# Patient Record
Sex: FEMALE | Race: WHITE
[De-identification: names, ages, dates, MRNs, and addresses within clinical notes are randomized per-mention and may not be internally consistent; named-entity substitution may affect disease eponyms.]

---

## 2017-01-11 ENCOUNTER — HOSPITAL ENCOUNTER (OUTPATIENT)
Dept: HOSPITAL 75 - ER | Age: 62
Setting detail: OBSERVATION
LOS: 1 days | Discharge: HOME | End: 2017-01-12
Attending: FAMILY MEDICINE | Admitting: FAMILY MEDICINE
Payer: COMMERCIAL

## 2017-01-11 VITALS — BODY MASS INDEX: 15.73 KG/M2 | HEIGHT: 64 IN | WEIGHT: 92.12 LBS

## 2017-01-11 VITALS — DIASTOLIC BLOOD PRESSURE: 67 MMHG | SYSTOLIC BLOOD PRESSURE: 108 MMHG

## 2017-01-11 VITALS — DIASTOLIC BLOOD PRESSURE: 68 MMHG | SYSTOLIC BLOOD PRESSURE: 109 MMHG

## 2017-01-11 DIAGNOSIS — Z99.81: ICD-10-CM

## 2017-01-11 DIAGNOSIS — J44.1: Primary | ICD-10-CM

## 2017-01-11 DIAGNOSIS — R09.02: ICD-10-CM

## 2017-01-11 LAB
ALBUMIN SERPL-MCNC: 4 G/DL (ref 3.2–4.5)
ALT SERPL-CCNC: 16 U/L (ref 0–55)
ANION GAP SERPL CALC-SCNC: 8 MMOL/L (ref 5–14)
ARTERIAL PATENCY WRIST A: POSITIVE
AST SERPL-CCNC: 17 U/L (ref 5–34)
BASE EXCESS STD BLDA CALC-SCNC: 2.5 MMOL/L (ref -2.5–2.5)
BASOPHILS # BLD AUTO: 0.1 10^3/UL (ref 0–0.1)
BASOPHILS NFR BLD AUTO: 1 % (ref 0–10)
BILIRUB SERPL-MCNC: 0.4 MG/DL (ref 0.1–1)
BODY TEMPERATURE: 96
BUN SERPL-MCNC: 22 MG/DL (ref 7–18)
BUN/CREAT SERPL: 26
CALCIUM SERPL-MCNC: 10 MG/DL (ref 8.5–10.1)
CHLORIDE SERPL-SCNC: 100 MMOL/L (ref 98–107)
CO2 BLDA CALC-SCNC: 27.8 MMOL/L (ref 21–31)
CO2 SERPL-SCNC: 30 MMOL/L (ref 21–32)
CREAT SERPL-MCNC: 0.84 MG/DL (ref 0.6–1.3)
EOSINOPHIL # BLD AUTO: 1.1 10^3/UL (ref 0–0.3)
EOSINOPHIL NFR BLD AUTO: 10 % (ref 0–10)
ERYTHROCYTE [DISTWIDTH] IN BLOOD BY AUTOMATED COUNT: 13.1 % (ref 10–14.5)
GFR SERPLBLD BASED ON 1.73 SQ M-ARVRAT: > 60 ML/MIN
GLUCOSE SERPL-MCNC: 103 MG/DL (ref 70–105)
HCO3 BLDA-SCNC: 27 MMOL/L (ref 23–27)
LYMPHOCYTES # BLD AUTO: 2.3 X 10^3 (ref 1–4)
LYMPHOCYTES NFR BLD AUTO: 21 % (ref 12–44)
MCH RBC QN AUTO: 30 PG (ref 25–34)
MCHC RBC AUTO-ENTMCNC: 33 G/DL (ref 32–36)
MCV RBC AUTO: 91 FL (ref 80–99)
MONOCYTES # BLD AUTO: 1.3 X 10^3 (ref 0–1)
MONOCYTES NFR BLD AUTO: 12 % (ref 0–12)
NEUTROPHILS # BLD AUTO: 6.3 X 10^3 (ref 1.8–7.8)
NEUTROPHILS NFR BLD AUTO: 57 % (ref 42–75)
PCO2 BLDA: 37 MMHG (ref 35–45)
PH BLDA: 7.47 [PH] (ref 7.37–7.43)
PLATELET # BLD: 397 10^3/UL (ref 130–400)
PMV BLD AUTO: 8.9 FL (ref 7.4–10.4)
PO2 BLDA: 92 MMHG (ref 79–93)
POTASSIUM SERPL-SCNC: 4.4 MMOL/L (ref 3.6–5)
PROT SERPL-MCNC: 7 G/DL (ref 6.4–8.2)
RBC # BLD AUTO: 4.59 10^6/UL (ref 4.35–5.85)
SAO2 % BLDA FROM PO2: 98 % (ref 94–100)
SODIUM SERPL-SCNC: 138 MMOL/L (ref 135–145)
WBC # BLD AUTO: 11 10^3/UL (ref 4.3–11)

## 2017-01-11 PROCEDURE — 94640 AIRWAY INHALATION TREATMENT: CPT

## 2017-01-11 PROCEDURE — 71010: CPT

## 2017-01-11 PROCEDURE — 85025 COMPLETE CBC W/AUTO DIFF WBC: CPT

## 2017-01-11 PROCEDURE — 80053 COMPREHEN METABOLIC PANEL: CPT

## 2017-01-11 PROCEDURE — 96374 THER/PROPH/DIAG INJ IV PUSH: CPT

## 2017-01-11 PROCEDURE — 82805 BLOOD GASES W/O2 SATURATION: CPT

## 2017-01-11 PROCEDURE — 36415 COLL VENOUS BLD VENIPUNCTURE: CPT

## 2017-01-11 PROCEDURE — 94760 N-INVAS EAR/PLS OXIMETRY 1: CPT

## 2017-01-11 NOTE — XMS REPORT
Continuity of Care Document

 Created on: 2016



ESTEFANÍA AMANDA

External Reference #: [id number]

: 1955

Sex: Female



Demographics







 Address  [address] [place]  [address]

 

 Home Phone  [phone]

 

 Preferred Language  English

 

 Marital Status  Unknown

 

 Congregational Affiliation  Unknown

 

 Race  Unknown

 

 Ethnic Group  Unknown





Author







 Author  [facility]

 

 Organization  [facility]

 

 Address  Unknown

 

 Phone  Unavailable







Support







 Name  Relationship  Address  Phone

 

 HERNANDEZ HERNANDEZ  Caregiver  [facility]

 [address] [place], [place]  [id number] [phone]

 

 NINA HENLEY MD  Caregiver  [address], [address]

[place]  [address]  [phone]

 

 SONIA ROLLE MD  Caregiver  [address], [address]

[place], [place]  [address] [phone]

 

 PHILLIP STAUFFER  Next Of Kin  UN[place]

[place]  [address]  [phone]







Care Team Providers







 Care Team Member Name  Role  Phone

 

 NINA HENLEY MD  PCP  [phone]







Insurance Providers







 Payer Name  Policy Number  Subscriber Name  Relationship

 

 [id number]  Estefanía Amanda Self / Same As Patient

 

 s Medicare  [id number]  Estefanía Amanda Self / Same As Patient







Advance Directives







 Directive  Response  Recorded Date/Time

 

 Advance Directives  No  16 12:24pm

 

 Health Care Power of   No  16 9:47am

 

 Organ Donor  No  16 9:47am

 

 Resuscitation Status  Full Code  16 12:24pm







Chief Complaint and Reason for Visit







 Chief Complaint  General Problems/Pain

 

 Reason for Visit  [id number]







Problems

Active Problems







 Medical Problem  Onset Date  Status

 

 COPD with acute exacerbation  Unknown  Acute

 

 Dog bite  Unknown  Acute

 

 Laceration  Unknown  Acute

 

 Pneumonia  Unknown  Acute

 

 Shingles outbreak  Unknown  Acute







Medications

Current Home Medications







 Medication  Dose  Units  Route  Directions  Days/Qty  Instructions  Start Date

 

 Trazodone Hcl 50 Mg  50  Mg  Oral  Bedtime        12/26/15

 

 Omeprazole 40 Mg  40  Mg  Oral  Daily        12/26/15

 

 Citalopram Hydrobromide 10 Mg  10  Mg  Oral  Daily        16

 

 Lorazepam 1 Mg  1  Mg  Oral  Every 6 Hours as needed for Anxiety        

 

 Albuterol Sulfate 18 Gm  2  Puff  Inhalation  Every 4-6 Hours  as needed for 
Shortness Of Breath        16

 

 Tiotropium Bromide 1 Inh  1  Puff  Inhalation  Daily        16

 

 Guaifenesin/D-Methorphan Hb/Pe 118 Ml  10  Ml  Oral  Every 4-6 Hours  as 
needed for Cough        16

 

 Benzonatate 100 Mg  100  Mg  Oral  Three Times A Day as needed for Cough  30  
   16

 

 Prednisone 10 Mg  10  Mg  Oral  Daily  42  Take 6 tabs(60mg)daily,decrease by 
1 tab(10mg)every other day.  16

 

 Valacyclovir Hcl 1,000 Mg  1,000  Mg  Oral  Three Times A Day  21     16

 

 Hydrocodone/Acetaminophen 1 Each  1  Each  Oral  Every 6 Hours as needed for 
Pain  15     16





Past Home Medications







 Medication  Directions  Ordered  Status

 

 Clarithromycin 500 Mg Tab,     09  Discontinued

 

 Albuterol 8.5 Gm Hfa.aer.ad,     09  Discontinued

 

 Salmeterol Xinafoate 50 Mcg Disk,     09  Discontinued

 

 Promethazine Hcl/Codeine 120 Ml Syrup,  As Needed  09  Discontinued

 

 Tiotropium Bromide 18 Mcg Cap.w.dev,  Daily  09  Discontinued

 

 Albuterol 17 Gm Aerosol, 17 Gm Inhalation  As Needed  11  Discontinued

 

 Clonazepam (Klonopin) 0.5 Mg Tablet, 1 Tab Oral  As Needed  11  
Discontinued

 

 Cephalexin Monohydrate (Keflex) 500 Mg Capsule, 1 Each Oral  Three Times A Day
  11  Discontinued

 

 Prednisone 20 Mg Tab, 20 Mg Oral  Daily  11  Discontinued

 

 Prednisone 20 Mg Tab, 40 Mg Oral  Daily  11  Discontinued

 

 Prednisone 20 Mg Tab, 60 Mg Oral  Daily  11  Discontinued

 

 Lorazepam (Ativan) 1 Mg Tablet, 1 Each Oral  Three Times A Day And Prn    Discontinued

 

 Prednisone 10 Mg Tab,  5 Tabs Daily  11  Discontinued

 

 Prednisone 50 Mg Tab, 50 Mg Oral  Daily  11  Discontinued

 

 Ciprofloxacin 500 Mg Tablet, 1 Tab Oral  Twice A Day  13  Discontinued

 

 Potassium Chloride 10 Meq Tablet.sa, 10 Meq Oral  Daily  13  Discontinued

 

 Hydroxyzine Hcl 25 Mg Tablet, 1 - 2 Each Oral  Qid Prn  13  Discontinued

 

 Prednisone 20 Mg Tab, 20 Mg Oral  Twice A Day  13  Discontinued

 

 Prednisone 10 Mg Tab, 10 Mg Oral  Daily  09/22/15  Discontinued

 

 Montelukast Sodium 10 Mg Tablet, 10 Mg Oral  Bedtime  09/22/15  Discontinued

 

 Doxycycline Hyclate 100 Mg Tablet, 100 Mg Oral  Twice A Day  09/22/15  
Discontinued

 

 Prednisone 20 Mg Tab, 60 Mg Oral  Daily  09/22/15  Discontinued

 

 Hydrocodone/Chlorphen P-Stirex 480 Ml Eli.er.12h, 5 Ml Oral  Every 12 Hours as 
needed for Cough  09/22/15  Discontinued

 

 Prednisone 10 Mg Tab, 5 Mg Oral  Daily  12/26/15  Discontinued

 

 Amoxicillin/Potassium Clav 1 Each Tablet, 1 Each Oral  Twice A Day  12/26/15  
Discontinued

 

 Acetaminophen With Codeine 1 Each Tablet, 1 Each Oral  Every 6 Hours as needed 
for Pain  12/26/15  Discontinued







Social History







 Social History Problem  Response  Recorded Date/Time

 

 Alcohol Use  Denies Use  2015 5:12pm

 

 Recreational Drug Use  No  2015 5:12pm

 

 Recent Foreign Travel  No  2016 12:20pm

 

 Recent Infectious Disease Exposure  No  2016 12:20pm

 

 Sexually Transmitted Disease  No  2016 12:24pm

 

 Smoking Status  Former Smoker  2016 12:24pm

 

 Type Used  Cigarettes  2016 1:47pm

 

 Recent Hopitalizations  Yes  2016 12:24pm

 

 Sexually Transmitted Disease  No  2016 12:24pm









 Query  Response  Start Date  Stop Date

 

 Smoking Status  Former Smoker      







Hospital Discharge Instructions

No hospital discharge instructions.



Plan of Care







 Discharge Date  16 2:53pm

 

 Disposition  01 HOME, SELF-CARE

 

 Condition at Discharge  Stable

 

 Instructions/Education Provided  Shingles (POLLY)

 

 Prescriptions  See Medication Section

 

 Referrals  NINA HENLEY MD - Primary Care Physician

 

 Additional Instructions/Education  1. See your doctor next week for recheck.  
Radiology also recommends a repeat CT 

scan of your chest in 3-6 months to ensure that the nodules are stable in size  

 2. Return to ER for any concerns  

 3. Medication as directed  

All discharge instructions reviewed with patient and/or family. Voiced 

understanding.  4







Functional Status

No functional status results.



Allergies, Adverse Reactions, Alerts







 Allergen  Type  Severity  Reaction  Status  Last Updated

 

 Salmeterol  Allergy  Mild     Active  09

 

 fluticasone ([id number])  Allergy  Mild     Active  09







Immunizations







 Name  Given  Type

 

 FLU TRIvalent 5 years - Adult  16  Administered







Vital Signs

Acute Vital Signs





 Vital  Response  Date/Time

 

 Temperature (Fahrenheit)  99.4 degrees F (97.6 - 99.5)  2016 12:20pm

 

 Temperature (Calculated Celsius)  37.00904 degrees C (36.4 - 37.5)  2016 
12:20pm

 

 Temperature Source  Tympanic  2016 8:00am

 

 Pulse Rate (adult)  103 bpm (60 - 90)  2016 12:20pm

 

 Respiratory Rate  20 bpm (12 - 24)  2016 12:20pm

 

 O2 Sat by Pulse Oximetry  98 % (88 - 100)  2016 12:20pm

 

 Blood Pressure  137/89 mm Hg  2016 12:20pm

 

    Blood Pressure Mean  105 mm Hg  2016 12:20pm

 

 Pain      

 

    Numeric Pain Scale  10-Worst Possible Pain  2016 12:45pm

 

 Height (Feet)  5 feet  2016 12:20pm

 

 Height (Inches)  5 inches  2016 12:20pm

 

 Height (Calculated Centimeters)  165.009246 cm  2016 12:20pm

 

 Weight (Pounds)  110 pounds  2016 12:20pm

 

 Weight (Ounces)  4.0 oz  2016 7:10am

 

 Weight (Calculated Grams)  24809.316 gm  2016 7:10am

 

 Weight (Calculated Kilograms)  49.609630 kilograms  2016 12:20pm

 

 Calculated BMI  16.9  2016 9:54am

 

 Capillary Refill      

 

    Capillary Refill  Less Than 3 Seconds  2016 12:20pm







Results

Laboratory Results







 Test Name  Result  Units  Flags  Reference  Collection Date/Time  Result Date/
Time  Comments

 

 White Blood Count  9.8  10^3/uL     4.3-11.0  2016 5:17am  2016 6:
32am   

 

 Red Blood Count  4.00  10^6/uL  L  4.35-5.85  2016 5:17am  2016 6:
32am   

 

 Hemoglobin  12.5  G/DL     11.5-16.0  2016 5:17am  2016 6:32am   

 

 Hematocrit  38  %     35-52  2016 5:  2016 6:32am   

 

 Mean Corpuscular Volume  96  FL     80-99  2016 5:  2016 6:
32am   

 

 Mean Corpuscular Hemoglobin  31  PG     25-34  2016 5:  2016 6:
32am   

 

 Mean Corpuscular Hemoglobin Concent  33  G/DL     32-36  2016 5:  2016 6:32am   

 

 Red Cell Distribution Width  13.4  %     10.0-14.5  2016 5:  2016 6:32am   

 

 Platelet Count  253  10^3/uL     130-400  2016 5:  2016 6:32am
   

 

 Mean Platelet Volume  9.7  FL     7.4-10.4  2016 5:  2016 6:
32am   

 

 Neutrophils (%) (Auto)  87  %  H  42-75  2016 5:  2016 6:32am 
  

 

 Lymphocytes (%) (Auto)  6  %  L  12-44  2016 5:  2016 6:32am   

 

 Monocytes (%) (Auto)  7  %     0-12  2016 5:  2016 6:32am   

 

 Eosinophils (%) (Auto)  0  %     0-10  2016 5:  2016 6:32am   

 

 Basophils (%) (Auto)  0  %     0-10  2016 5:  2016 6:32am   

 

 Neutrophils # (Auto)  8.5  X 10^3  H  1.8-7.8  2016 5:  2016 6:
32am   

 

 Lymphocytes # (Auto)  0.6  X 10^3  L  1.0-4.0  2016 5:  2016 6:
32am   

 

 Monocytes # (Auto)  0.7  X 10^3     0.0-1.0  2016 5:  2016 6:
32am   

 

 Eosinophils # (Auto)  0.0  10^3/uL     0.0-0.3  2016 5:17am  2016 6
:32am   

 

 Basophils # (Auto)  0.0  10^3/uL     0.0-0.1  2016 5:17am  2016 6:
32am   

 

 Neutrophils % (Manual)  53  %        2016 6:50am  2016 7:53am   

 

 Band Neutrophils  0  %        2016 6:50am  2016 7:53am   

 

 Lymphocytes % (Manual)  33  %        2016 6:50am  2016 7:53am   

 

 Monocytes % (Manual)  7  %        2016 6:50am  2016 7:53am   

 

 Eosinophils % (Manual)  7  %        2016 6:50am  2016 7:53am   

 

 Basophils % (Manual)  0  %        2016 6:50am  2016 7:53am   

 

 Blood Morphology Comment  NORMAL           2016 6:50am  2016 7:
53am   

 

 D-Dimer  < 0.27  UG/ML     0.00-0.49  2016 6:50am  2016 7:37am   

 

 Sodium Level  141  MMOL/L     135-145  2016 6:50am  2016 7:30am   

 

 Potassium Level  4.4  MMOL/L     3.6-5.0  2016 6:50am  2016 7:30am
   

 

 Chloride Level  105  MMOL/L       2016 6:50am  2016 7:30am   

 

 Carbon Dioxide Level  26  MMOL/L     21-32  2016 6:50am  2016 7:
30am   

 

 Anion Gap  10  MMOL/L     5-14  2016 6:50am  2016 7:30am   

 

 Blood Urea Nitrogen  10  MG/DL     7-18  2016 6:50am  2016 7:30am 
  

 

 Creatinine  0.95  MG/DL     0.60-1.30  2016 6:50am  2016 7:30am   

 

 BUN/Creatinine Ratio  2016 6:50am  2016 7:30am   

 

 Estimat Glomerular Filtration Rate  60           2016 6:50am  2016 
7:30am                    GFR INTERPRETIVE DATA  

  

         UNITS FOR ESTIMATED GFR (eGFR): mL/min/1.73 M2  

         REFERENCE RANGE FOR ESTIMATED GFR (eGFR)  

                                          eGFR  

         NORMAL eGFR                       >60  

         MODERATELY DECREASED eGFR          30-59  

         SEVERLY DECREASED eGFR             15-29  

         KIDNEY FAILURE                    <15 (OR DIALYSIS)  

 

 Glucose Level  106  MG/DL  H    2016 6:50am  2016 7:30am   

 

 Calcium Level  9.6  MG/DL     8.5-10.1  2016 6:50am  2016 7:30am   

 

 Total Bilirubin  0.5  MG/DL     0.1-1.0  2016 6:50am  2016 7:30am 
  

 

 Alkaline Phosphatase  81  U/L       2016 6:50am  2016 7:30am
   

 

 Aspartate Amino Transf (AST/SGOT)  17  U/L     5-34  2016 6:50am  2016 7:30am   

 

 Alanine Aminotransferase (ALT/SGPT)  20  U/L     0-55  2016 6:50am   7:30am   

 

 B-Type Natriuretic Peptide  13.6  PG/ML     <100.0  2016 6:50am  2016 7:50am   

 

 Total Protein  6.3  G/DL  L  6.4-8.2  2016 6:50am  2016 7:30am   

 

 Albumin  4.2  G/DL     3.2-4.5  2016 6:50am  2016 7:30am   

 

 Lactic Acid Level  1.4  MMOL/L     0.5-2.0  2016 7:46am  2016 8:
16am   





Pending Laboratory Results







 Test Name  Collection Date/Time

 

     

 

     

 

     

 

     

 

     

 

     

 

     

 

     

 

     

 

     

 

     

 

     

 

     

 

     

 

     

 

     

 

     

 

     

 

     

 

     

 

     

 

     

 

     

 

     

 

     

 

     

 

     

 

     

 

     

 

     

 

     

 

     

 

     

 

     

 

     

 

     

 

     





Microbiology Results







 Procedure  Source  Result  Collection Date/Time  Result Date/Time

 

 Blood Culture  Peripheral, Rt Ac  No growth  2016 9:17am  2016 4:
13pm

 

 Blood Culture  Peripheral, Rt Ac  No growth  2016 7:46am  2016 4:
13pm







Procedures







 Procedure  Status  Date  Provider(s)

 

 Tracing only of electrocardiogram  Completed  16  CARLITOS CHRISTOPHER MD







Encounters







 Encounter  Location  Arrival/Admit Date  Discharge/Depart Date  Attending 
Provider

 

 Departed Emergency Room  [facility]  16 12:12pm   2:53pm  HERNANDEZ HERNANDEZ

 

 Discharged Inpatient  [facility]  11/10/16 2:25pm   1:43pm  NINA HENLEY MD











 Recent Diagnosis

## 2017-01-11 NOTE — ED DYSPNEA
General


Stated Complaint:  SOA


Source of Information:  Patient


Exam Limitations:  No Limitations





History of Present Illness


Time Seen by Provider:  15:37


Initial Comments


History of COPD on home oxygen.  Increased dyspnea over the past several days.  

Cough productive of white sputum..  No fevers or chest pain.  Using nebulizer 

treatments without relief.  Hospitalized in November for same.





Allergies and Home Medications


Allergies


Coded Allergies:  


     fluticasone (Unverified  Allergy, Mild, 12/3/09)


     salmeterol (Unverified  Allergy, Mild, 12/3/09)





Home Medications


Albuterol Sulfate 18 Gm Hfa.aer.ad 2 PUFF IH EVERY 4-6 HOURS  PRN PRN SHORTNESS 

OF BREATH (Reported) 


Benzonatate 100 Mg Capsule #30 100 MG PO TID PRN PRN COUGH 


   Prescribed by: LEA BLOCK on 11/12/16 1216


Citalopram Hydrobromide 10 Mg Tablet 10 MG PO DAILY (Reported) 


Guaifenesin/D-Methorphan Hb/ Ml Liquid 10 ML PO EVERY 4-6 HOURS  PRN PRN 

COUGH (Reported) 


Hydrocodone/Acetaminophen 1 Each Tablet #15 1 EACH PO Q6H PRN PRN PAIN 


   Prescribed by: HERNANDEZ HERNANDEZ on 12/2/16 1301


Hydroxyzine Pamoate 50 Mg Capsule #20 50 MG PO Q6H 


   Prescribed by: DAYAN MAGAÑA on 12/16/16 1926


Lorazepam 1 Mg Tablet 1 MG PO Q6H PRN PRN ANXIETY (Reported) 


Omeprazole 40 Mg Capsule.dr 40 MG PO DAILY (Reported) 


Prednisone 10 Mg Tab.ds.pk #42 10 MG PO DAILY 


   Take 6 tabs(60mg)daily,decrease by 1 tab(10mg)every other day. 


   Prescribed by: LEA BLOCK on 11/12/16 1216


Tiotropium Bromide 1 Inh Aerp 1 PUFF IH DAILY (Reported) 


Trazodone HCl 50 Mg Tablet 50 MG PO HS (Reported) 


Valacyclovir HCl 1,000 Mg Tablet #21 1,000 MG PO TID 


   Prescribed by: HERNANDEZ HERNANDEZ on 12/2/16 1300





Constitutional:  No fever,  malaise weakness


Respiratory:   cough short of breath


Cardiovascular:   no symptoms reported


Musculoskeletal:   no symptoms reported


All Other Systems Reviewed


Negative Unless Noted:  Yes





Past Medical-Social-Family Hx


Patient Social History


Type Used:  Cigarettes


Recent Hopitalizations:  Yes





Seasonal Allergies


Seasonal Allergies:  No





Surgeries


HX Surgeries:  Yes


Surgeries:  Tubal Ligation





Respiratory


Hx Respiratory Disorders:  Yes (O2 DEPENDENT)


Respiratory Disorders:  COPD





Cardiovascular


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





Neurological


Hx Neurological Disorders:  No





Reproductive System


Hx Reproductive Disorders:  No


Sexually Transmitted Disease:  No


Female Reproductive Disorders:  Denies


GYN History:  Tubal Ligation, Menopausal





Genitourinary


Hx Genitourinary Disorders:  No





Gastrointestinal


Hx Gastrointestinal Disorders:  No





Musculoskeletal


Hx Musculoskeletal Disorders:  No





Endocrine


Hx Endocrine Disorders:  No





HEENT


HX ENT Disorders:  No


Loss of Vision:  Denies


Hearing Impairment:  Denies





Cancer


Hx Cancer:  No





Psychosocial


Hx Psychiatric Problems:  Yes


Behavioral Health Disorders:  Anxiety, Depression





Integumentary


HX Skin/Integumentary Disorder:  Yes (SHINGLES 12/2016)





Blood Transfusions


Hx Blood Disorders:  No





Reviewed Nursing Assessment


Reviewed/Agree w Nursing PMH:  Yes





Family Medical History


Family Medial History:  


Neoplasm


  20 FATHER


Respiratory disorder


  19 FATHER





Physical Exam


Vital Signs


Capillary Refill :


General Appearance:   WD/WN Moderate Distress


HEENT:   PERRL/EOMI Pharynx Normal


Neck:   Supple


Respiratory:   Decreased Breath Sounds Respiratory Distress (patient in 

respiratory distress, using accessory muscles) Wheezing


Cardiovascular:   Regular Rate, Rhythm No Edema


Gastrointestinal:   Non Tender Soft


Extremity:   Normal Inspection Normal Range of Motion


Neurologic/Psychiatric:   Alert No Motor/Sensory Deficits


Skin:   Normal Color Warm/Dry





Laceration Repair :  


   Suture Size:  5-0





Progress/Results/Core Measures


Results/Orders


Lab Results





 Laboratory Tests








Test


  1/11/17


15:32 1/11/17


15:50 Range/Units


 


 


Alanine Aminotransferase


(ALT/SGPT) 16 


  


  0-55  U/L


 


 


Albumin 4.0   3.2-4.5  G/DL


 


Alkaline Phosphatase 76     U/L


 


Anion Gap 8   5-14  MMOL/L


 


Aspartate Amino Transf


(AST/SGOT) 17 


  


  5-34  U/L


 


 


BUN/Creatinine Ratio 26    


 


Basophils # (Auto)


  0.1 


  


  0.0-0.1


10^3/uL


 


Basophils (%) (Auto) 1   0-10  %


 


Blood Urea Nitrogen 22 H  7-18  MG/DL


 


Calcium Level 10.0   8.5-10.1  MG/DL


 


Carbon Dioxide Level 30   21-32  MMOL/L


 


Chloride Level 100     MMOL/L


 


Creatinine


  0.84 


  


  0.60-1.30


MG/DL


 


Eosinophils # (Auto)


  1.1 H


  


  0.0-0.3


10^3/uL


 


Eosinophils (%) (Auto) 10   0-10  %


 


Estimat Glomerular Filtration


Rate > 60 


  


   


 


 


Glucose Level 103     MG/DL


 


Hematocrit 42   35-52  %


 


Hemoglobin 13.9   11.5-16.0  G/DL


 


Lymphocytes # (Auto) 2.3   1.0-4.0  X 10^3


 


Lymphocytes (%) (Auto) 21   12-44  %


 


Mean Corpuscular Hemoglobin 30   25-34  PG


 


Mean Corpuscular Hemoglobin


Concent 33 


  


  32-36  G/DL


 


 


Mean Corpuscular Volume 91   80-99  FL


 


Mean Platelet Volume 8.9   7.4-10.4  FL


 


Monocytes # (Auto) 1.3 H  0.0-1.0  X 10^3


 


Monocytes (%) (Auto) 12   0-12  %


 


Neutrophils # (Auto) 6.3   1.8-7.8  X 10^3


 


Neutrophils (%) (Auto) 57   42-75  %


 


Platelet Count


  397 


  


  130-400


10^3/uL


 


Potassium Level 4.4   3.6-5.0  MMOL/L


 


Red Blood Count


  4.59 


  


  4.35-5.85


10^6/uL


 


Red Cell Distribution Width 13.1   10.0-14.5  %


 


Sodium Level 138   135-145  MMOL/L


 


Total Bilirubin 0.4   0.1-1.0  MG/DL


 


Total Protein 7.0   6.4-8.2  G/DL


 


White Blood Count


  11.0 


  


  4.3-11.0


10^3/uL


 


Hermes Test  POSITIVE   


 


Arterial Blood Base Excess


  


  2.5 


  -2.5-2.5


MMOL/L


 


Arterial Blood HCO3  27  23-27  MMOL/L


 


Arterial Blood Oxygen


Saturation 


  98 


    %


 


 


Arterial Blood Partial


Pressure CO2 


  37 


  35-45  MMHG


 


 


Arterial Blood Partial


Pressure O2 


  92 


  79-93  MMHG


 


 


Arterial Blood Total CO2


  


  27.8 


  21.0-31.0


MMOL/L


 


Arterial Blood pH  7.47 H 7.37-7.43  


 


Blood Gas Inspired Oxygen  2L   


 


Blood Gas Patient Temperature  96.0   


 


Blood Gas Puncture Site  RIGHT RADIAL   


 


Blood Gas Ventilator Setting  NO   





Labs were reviewed


My Orders





 Orders-MARYCRUZ JOSEPH MD


Cbc With Automated Diff (1/11/17 15:22)


Comprehensive Metabolic Panel (1/11/17 15:22)


Chest 1 View, Ap/Pa Only (1/11/17 15:22)


Albuterol/Ipra Inhalation Soln (Duoneb I (1/11/17 15:30)


Svn Sm Volume Nebulizer Rt-Rfs (1/11/17 15:22)


Arterial Blood Gas (1/11/17 15:22)


Methylprednisolone Sod Succ (Solu-Medrol (1/11/17 15:30)


Saline Lock/Iv-Start (1/11/17 16:10)





Medications Given in ED





 Current Medications








 Medications  Dose


 Ordered  Sig/Arlet


 Route  Start Time


 Stop Time Status Last Admin


Dose Admin


 


 Albuterol/


 Ipratropium  3 ml  ONCE  ONCE


 INH  1/11/17 15:30


 1/11/17 15:31 DC 1/11/17 15:35


3 ML


 


 Methylprednisolone


 Sodium Succinate  125 mg  ONCE  ONCE


 IVP  1/11/17 15:30


 1/11/17 15:31 DC 1/11/17 15:40


125 MG








Progress Note :  


   Time:  16:07


Progress Note


Breathing better after nebulizer treatment.  Steroids on board.  Admit for 

observation.





Diagnostic Imaging





Comments


c x-ray shows COPD but nothing acute





Departure


Communication


Time/Spoke to Admitting Phy:  15:42


Communication


Spoke wt Dr Henley, will admit





Impression


Impression:  


 Primary Impression:  


 COPD with acute exacerbation


Disposition:  09 ADMITTED AS INPATIENT


Condition:  Stable


Decision to Admit Reason:  Admit from ER (General)


Decision to Admit/Date:  Jan 11, 2017


Time/Decision to Admit Time:  15:42





Departure-Patient Inst.


Referrals:  


NINA HENLEY MD (PCP/Family)


Primary Care Physician








MARYCRUZ JOSEPH MD Jan 11, 2017 15:42

## 2017-01-12 VITALS — DIASTOLIC BLOOD PRESSURE: 62 MMHG | SYSTOLIC BLOOD PRESSURE: 94 MMHG

## 2017-01-12 VITALS — DIASTOLIC BLOOD PRESSURE: 76 MMHG | SYSTOLIC BLOOD PRESSURE: 126 MMHG

## 2017-01-12 VITALS — SYSTOLIC BLOOD PRESSURE: 127 MMHG | DIASTOLIC BLOOD PRESSURE: 69 MMHG

## 2017-01-12 VITALS — DIASTOLIC BLOOD PRESSURE: 62 MMHG | SYSTOLIC BLOOD PRESSURE: 102 MMHG

## 2017-01-12 NOTE — HISTORY & PHYSICIAL
History of Present Illness


History of Present Illness


Reason for visit/HPI


61-year-old female presents to [facility] emergency department during the 

afternoon of 2017 with respiratory distress.  Patient was brought 

in by EMS.  Barely patient had been suffering from increasing shortness of 

breath over the previous 2 days prior to admission.  She is with known COPD and 

does utilize home oxygen as well as albuterol treatments as necessary.  She 

denies any fever.  Her cough is productive of white phlegm.


Date of Admission


2017 at 16:55


I consulted on this patient on


17


 07:09


Attending Physician


Nina Henley MD


Admitting Physician


Nina Henley MD


Consult








Allergies and Home Medications


Allergies


Coded Allergies:  


     fluticasone (Unverified  Allergy, Mild, 12/3/09)


     salmeterol (Unverified  Allergy, Mild, 12/3/09)





Home Medications


Albuterol Sulfate 18 Gm Hfa.aer.ad 2 PUFF IH EVERY 4-6 HOURS  PRN PRN SHORTNESS 

OF BREATH (Reported) 


Amitriptyline HCl 25 Mg Tablet 25 MG PO BID (Reported) 


Citalopram Hydrobromide 10 Mg Tablet 10 MG PO DAILY (Reported) 


Hydrocodone/Acetaminophen 1 Each Tablet #15 1 EACH PO Q6H PRN PRN PAIN 


   Prescribed by: HERNANDEZ HERNANDEZ on 16 1301


Lorazepam 2 Mg Tablet 1 MG PO Q6H (Reported) 


Omeprazole 40 Mg Capsule.dr 40 MG PO DAILY (Reported) 


Tiotropium Bromide 1 Inh Aerp 1 PUFF IH DAILY (Reported) 


Trazodone HCl 50 Mg Tablet 50 MG PO HS (Reported) 





Past Medical-Social-Family Hx


Patient Social History


Marrital Status:  cohabiting


Number of Children:  3


Employed/Student:  unemployed


Alcohol Use:  Denies Use


Recreational Drug Use:  No


Smoking Status:  Former Smoker


Type Used:  Cigarettes


Physical Abuse Screen:  No


Sexual Abuse:  No


Recent Foreign Travel:  No


Contact w/other who traveled:  No


Recent Hopitalizations:  Yes


Recent Infectious Disease Expo:  No





Seasonal Allergies


Seasonal Allergies:  No





Surgeries


HX Surgeries:  Yes


Surgeries:  Tubal Ligation





Respiratory


Hx Respiratory Disorders:  Yes (O2 DEPENDENT)





Cardiovascular


Hx Cardiovascular Disorders:  Yes (PIN HOLE IN HEART)





Neurological


Hx Neurological Disorders:  No





Reproductive System


Hx Reproductive Disorders:  No


Sexually Transmitted Disease:  No


Female Reproductive Disorders:  Denies





Genitourinary


Hx Genitourinary Disorders:  No





Gastrointestinal


Hx Gastrointestinal Disorders:  No


Gastrointestinal Disorders:  Gastroesophageal Reflux





Musculoskeletal


Hx Musculoskeletal Disorders:  No


Musculoskeletal Disorders:  Arthritis, Chronic Back Pain





Endocrine


Hx Endocrine Disorders:  No





HEENT


HX ENT Disorders:  No


HEENT Disorders:  Cataract


Loss of Vision:  Denies


Hearing Impairment:  Denies





Cancer


Hx Cancer:  No





Psychosocial


Hx Psychiatric Problems:  Yes


Behavioral Health Disorders:  Anxiety, Depression





Integumentary


HX Skin/Integumentary Disorder:  Yes (SHINGLES 2016)





Blood Transfusions


Hx Blood Disorders:  No


Adverse Reaction to a Blood Tr:  No





Reviewed Nursing Assessment


Reviewed/Agree w Nursing PMH:  Yes





Family Medical History


Family Hx:  


Neoplasm


  19 FATHER


Respiratory disorder


  19 FATHER





Constitutional:   see HPI





Physical Exam


Vital Signs





 Vital Sign - Last 12Hours








 17





 15:20


 


Temp 96.4


 


Pulse 112


 


Resp 35


 


B/P 104/75


 


Pulse Ox 96


 


O2 Delivery Nasal Cannula


 


O2 Flow Rate 4





Capillary Refill : Less Than 3 Seconds


General Appearance:   Mild Distress (upon arrival in the emergency department)


Neck:   Supple


Respiratory:   Decreased Breath Sounds


Cardiovascular:   Regular Rate, Rhythm


Gastrointestinal:   Soft


Rectal:   Deferred


Extremity:   Normal Capillary Refill





Assessment/Plan


Assessment and Plan


1.  COPD exacerbation with hypoxemia


-patient to be admitted for further respiratory care including albuterol 

treatments and oxygen supplementation.  


-patient will also receive IV Solu-Medrol.





Admission Diagnosis


1.  COPD exacerbation with hypoxemia





Clinical Quality Measures


DVT/VTE Risk/Contraindication:


Risk Factor Score Per Nursin


RFS Level Per Nursing on Admit:  4+=Very High








NINA HENLEY MD 2017 07:12


Admission Diagnosis


1.  COPD exacerbation with hypoxemia





Clinical Quality Measures


DVT/VTE Risk/Contraindication:


Risk Factor Score Per Nursin


RFS Level Per Nursing on Admit:  4+=Very High








NINA HENLEY MD 2017 07:12

## 2017-01-13 NOTE — PHYSICIAN QUERY-FINAL DX
AKIRA LEE 1/13/17 1508:


Final Diagnosis


Give Final Diagnosis


Please give Final Diagnosis





NINA HENLEY MD 1/23/17 0732:


Final Diagnosis


Give Final Diagnosis


1.  COPD exacerbation


2.  Hypoxemia








AKIRA LEE Jan 13, 2017 15:08


NINA HENLEY MD Jan 23, 2017 07:32

## 2017-08-05 NOTE — DIAGNOSTIC IMAGING REPORT
INDICATION:

Shortness of air with cough.



COMPARISON:

12/16/2016.



FINDINGS:

Air trapping and COPD are chronic. The heart size and

vascularity are within normal limits. There is no effusion or

pneumothorax. No change.



IMPRESSION:

Hyperexpanded clear lungs with changes of chronic COPD;

otherwise, negative.



Dictated by:



Dictated on workstation # [id number]
JOINT PAIN

## 2017-11-29 ENCOUNTER — HOSPITAL ENCOUNTER (OUTPATIENT)
Dept: HOSPITAL 75 - RT | Age: 62
End: 2017-11-29
Attending: INTERNAL MEDICINE
Payer: MEDICARE

## 2017-11-29 DIAGNOSIS — F41.9: ICD-10-CM

## 2017-11-29 DIAGNOSIS — J44.9: Primary | ICD-10-CM

## 2017-11-29 DIAGNOSIS — Z72.0: ICD-10-CM

## 2017-11-29 PROCEDURE — 94729 DIFFUSING CAPACITY: CPT

## 2017-11-29 PROCEDURE — 94060 EVALUATION OF WHEEZING: CPT

## 2017-11-29 PROCEDURE — 94726 PLETHYSMOGRAPHY LUNG VOLUMES: CPT

## 2018-01-08 ENCOUNTER — HOSPITAL ENCOUNTER (OUTPATIENT)
Dept: HOSPITAL 75 - PULM | Age: 63
LOS: 90 days | Discharge: HOME | End: 2018-04-08
Attending: INTERNAL MEDICINE
Payer: MEDICARE

## 2018-01-08 DIAGNOSIS — Z72.0: ICD-10-CM

## 2018-01-08 DIAGNOSIS — J44.9: Primary | ICD-10-CM

## 2018-01-08 DIAGNOSIS — F41.9: ICD-10-CM

## 2018-01-08 PROCEDURE — 99211 OFF/OP EST MAY X REQ PHY/QHP: CPT

## 2018-02-07 ENCOUNTER — HOSPITAL ENCOUNTER (INPATIENT)
Dept: HOSPITAL 75 - ER | Age: 63
LOS: 7 days | Discharge: HOME | DRG: 189 | End: 2018-02-14
Attending: FAMILY MEDICINE | Admitting: FAMILY MEDICINE
Payer: MEDICARE

## 2018-02-07 VITALS — HEIGHT: 63 IN | BODY MASS INDEX: 23.29 KG/M2 | WEIGHT: 131.44 LBS

## 2018-02-07 VITALS — SYSTOLIC BLOOD PRESSURE: 144 MMHG | DIASTOLIC BLOOD PRESSURE: 97 MMHG

## 2018-02-07 VITALS — DIASTOLIC BLOOD PRESSURE: 81 MMHG | SYSTOLIC BLOOD PRESSURE: 127 MMHG

## 2018-02-07 VITALS — SYSTOLIC BLOOD PRESSURE: 102 MMHG | DIASTOLIC BLOOD PRESSURE: 76 MMHG

## 2018-02-07 VITALS — DIASTOLIC BLOOD PRESSURE: 74 MMHG | SYSTOLIC BLOOD PRESSURE: 100 MMHG

## 2018-02-07 VITALS — DIASTOLIC BLOOD PRESSURE: 58 MMHG | SYSTOLIC BLOOD PRESSURE: 97 MMHG

## 2018-02-07 VITALS — DIASTOLIC BLOOD PRESSURE: 93 MMHG | SYSTOLIC BLOOD PRESSURE: 124 MMHG

## 2018-02-07 VITALS — SYSTOLIC BLOOD PRESSURE: 94 MMHG | DIASTOLIC BLOOD PRESSURE: 62 MMHG

## 2018-02-07 VITALS — SYSTOLIC BLOOD PRESSURE: 96 MMHG | DIASTOLIC BLOOD PRESSURE: 65 MMHG

## 2018-02-07 VITALS — DIASTOLIC BLOOD PRESSURE: 75 MMHG | SYSTOLIC BLOOD PRESSURE: 123 MMHG

## 2018-02-07 VITALS — DIASTOLIC BLOOD PRESSURE: 94 MMHG | SYSTOLIC BLOOD PRESSURE: 108 MMHG

## 2018-02-07 VITALS — DIASTOLIC BLOOD PRESSURE: 83 MMHG | SYSTOLIC BLOOD PRESSURE: 97 MMHG

## 2018-02-07 VITALS — DIASTOLIC BLOOD PRESSURE: 72 MMHG | SYSTOLIC BLOOD PRESSURE: 109 MMHG

## 2018-02-07 DIAGNOSIS — M54.9: ICD-10-CM

## 2018-02-07 DIAGNOSIS — Z66: ICD-10-CM

## 2018-02-07 DIAGNOSIS — F32.9: ICD-10-CM

## 2018-02-07 DIAGNOSIS — K21.9: ICD-10-CM

## 2018-02-07 DIAGNOSIS — Z79.52: ICD-10-CM

## 2018-02-07 DIAGNOSIS — G47.9: ICD-10-CM

## 2018-02-07 DIAGNOSIS — J40: ICD-10-CM

## 2018-02-07 DIAGNOSIS — J96.21: Primary | ICD-10-CM

## 2018-02-07 DIAGNOSIS — M19.91: ICD-10-CM

## 2018-02-07 DIAGNOSIS — F17.210: ICD-10-CM

## 2018-02-07 DIAGNOSIS — F41.0: ICD-10-CM

## 2018-02-07 DIAGNOSIS — Z99.81: ICD-10-CM

## 2018-02-07 DIAGNOSIS — J44.1: ICD-10-CM

## 2018-02-07 LAB
ALBUMIN SERPL-MCNC: 4.1 GM/DL (ref 3.2–4.5)
ALP SERPL-CCNC: 86 U/L (ref 40–136)
ALT SERPL-CCNC: 16 U/L (ref 0–55)
APTT BLD: 29 SEC (ref 24–35)
ARTERIAL PATENCY WRIST A: (no result)
ARTERIAL PATENCY WRIST A: (no result)
BASE EXCESS STD BLDA CALC-SCNC: 0.9 MMOL/L (ref -2.5–2.5)
BASE EXCESS STD BLDA CALC-SCNC: 3.3 MMOL/L (ref -2.5–2.5)
BASOPHILS # BLD AUTO: 0 10^3/UL (ref 0–0.1)
BASOPHILS NFR BLD AUTO: 0 % (ref 0–10)
BDY SITE: (no result)
BDY SITE: (no result)
BILIRUB SERPL-MCNC: 0.3 MG/DL (ref 0.1–1)
BODY TEMPERATURE: 100
BODY TEMPERATURE: 98.4
BUN/CREAT SERPL: 9
CALCIUM SERPL-MCNC: 8.7 MG/DL (ref 8.5–10.1)
CHLORIDE SERPL-SCNC: 98 MMOL/L (ref 98–107)
CO2 BLDA CALC-SCNC: 27.3 MMOL/L (ref 21–31)
CO2 BLDA CALC-SCNC: 29.7 MMOL/L (ref 21–31)
CO2 SERPL-SCNC: 24 MMOL/L (ref 21–32)
CREAT SERPL-MCNC: 0.96 MG/DL (ref 0.6–1.3)
EOSINOPHIL # BLD AUTO: 0 10^3/UL (ref 0–0.3)
EOSINOPHIL NFR BLD AUTO: 0 % (ref 0–10)
ERYTHROCYTE [DISTWIDTH] IN BLOOD BY AUTOMATED COUNT: 14.1 % (ref 10–14.5)
GFR SERPLBLD BASED ON 1.73 SQ M-ARVRAT: 59 ML/MIN
GLUCOSE SERPL-MCNC: 85 MG/DL (ref 70–105)
HCT VFR BLD CALC: 43 % (ref 35–52)
HGB BLD-MCNC: 13.9 G/DL (ref 11.5–16)
INHALED O2 FLOW RATE: (no result) L/MIN
INHALED O2 FLOW RATE: (no result) L/MIN
INR PPP: 0.9 (ref 0.8–1.4)
LYMPHOCYTES # BLD AUTO: 0.9 X 10^3 (ref 1–4)
LYMPHOCYTES NFR BLD AUTO: 13 % (ref 12–44)
MAGNESIUM SERPL-MCNC: 1.9 MG/DL (ref 1.8–2.4)
MANUAL DIFFERENTIAL PERFORMED BLD QL: NO
MCH RBC QN AUTO: 30 PG (ref 25–34)
MCHC RBC AUTO-ENTMCNC: 33 G/DL (ref 32–36)
MCV RBC AUTO: 92 FL (ref 80–99)
MONOCYTES # BLD AUTO: 0.6 X 10^3 (ref 0–1)
MONOCYTES NFR BLD AUTO: 9 % (ref 0–12)
NEUTROPHILS # BLD AUTO: 5.5 X 10^3 (ref 1.8–7.8)
NEUTROPHILS NFR BLD AUTO: 78 % (ref 42–75)
PCO2 BLDA: 47 MMHG (ref 35–45)
PCO2 BLDA: 52 MMHG (ref 35–45)
PH BLDA: 7.36 [PH] (ref 7.37–7.43)
PH BLDA: 7.36 [PH] (ref 7.37–7.43)
PLATELET # BLD: 201 10^3/UL (ref 130–400)
PMV BLD AUTO: 9 FL (ref 7.4–10.4)
PO2 BLDA: 43 MMHG (ref 79–93)
PO2 BLDA: 81 MMHG (ref 79–93)
POTASSIUM SERPL-SCNC: 4.2 MMOL/L (ref 3.6–5)
PROT SERPL-MCNC: 6.8 GM/DL (ref 6.4–8.2)
PROTHROMBIN TIME: 12.1 SEC (ref 12.2–14.7)
RBC # BLD AUTO: 4.61 10^6/UL (ref 4.35–5.85)
SAO2 % BLDA FROM PO2: 73 % (ref 94–100)
SAO2 % BLDA FROM PO2: 97 % (ref 94–100)
SODIUM SERPL-SCNC: 134 MMOL/L (ref 135–145)
VENTILATION MODE VENT: NO
VENTILATION MODE VENT: NO
WBC # BLD AUTO: 7 10^3/UL (ref 4.3–11)

## 2018-02-07 PROCEDURE — 87040 BLOOD CULTURE FOR BACTERIA: CPT

## 2018-02-07 PROCEDURE — 85025 COMPLETE CBC W/AUTO DIFF WBC: CPT

## 2018-02-07 PROCEDURE — 80053 COMPREHEN METABOLIC PANEL: CPT

## 2018-02-07 PROCEDURE — 94760 N-INVAS EAR/PLS OXIMETRY 1: CPT

## 2018-02-07 PROCEDURE — 87804 INFLUENZA ASSAY W/OPTIC: CPT

## 2018-02-07 PROCEDURE — 83605 ASSAY OF LACTIC ACID: CPT

## 2018-02-07 PROCEDURE — 96361 HYDRATE IV INFUSION ADD-ON: CPT

## 2018-02-07 PROCEDURE — 83735 ASSAY OF MAGNESIUM: CPT

## 2018-02-07 PROCEDURE — 82805 BLOOD GASES W/O2 SATURATION: CPT

## 2018-02-07 PROCEDURE — 96365 THER/PROPH/DIAG IV INF INIT: CPT

## 2018-02-07 PROCEDURE — 84100 ASSAY OF PHOSPHORUS: CPT

## 2018-02-07 PROCEDURE — 85730 THROMBOPLASTIN TIME PARTIAL: CPT

## 2018-02-07 PROCEDURE — 81000 URINALYSIS NONAUTO W/SCOPE: CPT

## 2018-02-07 PROCEDURE — 36415 COLL VENOUS BLD VENIPUNCTURE: CPT

## 2018-02-07 PROCEDURE — 80048 BASIC METABOLIC PNL TOTAL CA: CPT

## 2018-02-07 PROCEDURE — 94640 AIRWAY INHALATION TREATMENT: CPT

## 2018-02-07 PROCEDURE — 84484 ASSAY OF TROPONIN QUANT: CPT

## 2018-02-07 PROCEDURE — 71045 X-RAY EXAM CHEST 1 VIEW: CPT

## 2018-02-07 PROCEDURE — 96375 TX/PRO/DX INJ NEW DRUG ADDON: CPT

## 2018-02-07 PROCEDURE — 94660 CPAP INITIATION&MGMT: CPT

## 2018-02-07 PROCEDURE — 85610 PROTHROMBIN TIME: CPT

## 2018-02-07 PROCEDURE — 83880 ASSAY OF NATRIURETIC PEPTIDE: CPT

## 2018-02-07 PROCEDURE — 86141 C-REACTIVE PROTEIN HS: CPT

## 2018-02-07 RX ADMIN — SODIUM CHLORIDE AND POTASSIUM CHLORIDE SCH MLS/HR: 4.5; 1.49 INJECTION, SOLUTION INTRAVENOUS at 19:00

## 2018-02-07 RX ADMIN — SODIUM CHLORIDE SCH MLS/HR: 900 INJECTION, SOLUTION INTRAVENOUS at 22:40

## 2018-02-07 RX ADMIN — IPRATROPIUM BROMIDE AND ALBUTEROL SULFATE SCH ML: .5; 3 SOLUTION RESPIRATORY (INHALATION) at 23:12

## 2018-02-07 RX ADMIN — Medication SCH ML: at 21:47

## 2018-02-07 NOTE — XMS REPORT
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                     

 

 2017            HERNANDEZ HERNANDEZ            Ot            J44.9      
      CHRONIC OBSTRUCTIVE PULMONARY DISEASE, U                     

 

 2017            HERNANDEZ HERNANDEZ APRN            Ot            K59.00     
       CONSTIPATION, UNSPECIFIED                     

 

 2017            HERNANDEZ HERNANDEZ APRN            Ot            M51.37     
       OTHER INTERVERTEBRAL DISC DEGENERATION,                      

 

 2017            HERNANDEZ HERNANDEZ APRN            Ot            M54.5      
      LOW BACK PAIN                     

 

 2017            HERNANDEZ HERNANDEZ APRN            Ot            R91.1      
      SOLITARY PULMONARY NODULE                     

 

 2017            HERNANDEZ HERNANDEZ APRN            Ot            Z79.899    
        OTHER LONG TERM (CURRENT) DRUG THERAPY                     

 

 04/10/2017            NINA HENLEY MD            Ot            J18.9       
     PNEUMONIA, UNSPECIFIED ORGANISM                     

 

 2017            NINA HENLEY MD, Ot            J18.9       
     PNEUMONIA, UNSPECIFIED ORGANISM                     

 

 2017            NINA HENLEY MD, Ot            J18.9       
     PNEUMONIA, UNSPECIFIED ORGANISM                     

 

 2017            NINA HENLEY MD, Ot            J18.9       
     PNEUMONIA, UNSPECIFIED ORGANISM                     

 

 2017            NINA HENLEY MD, Ot            J18.9       
     PNEUMONIA, UNSPECIFIED ORGANISM                     

 

 10/16/2017            NINA HENLEY MD            Ot            F32.9       
     MAJOR DEPRESSIVE DISORDER, SINGLE EPISOD                     

 

 10/16/2017            NINA HENLEY MD, Ot            F41.9       
     ANXIETY DISORDER, UNSPECIFIED                     

 

 10/16/2017            NINA HENLEY MD, Ot            H26.9       
     UNSPECIFIED CATARACT                     

 

 10/16/2017            NINA HENLEY MD, Ot            J44.1       
     CHRONIC OBSTRUCTIVE PULMONARY DISEASE W                      

 

 10/16/2017            NINA HENLEY MD, Ot            J96.20      
      ACUTE AND CHR RESP FAILURE, UNSP W HYPOX                     

 

 10/16/2017            NINA HENLEY MD, Ot            K21.9       
     GASTRO-ESOPHAGEAL REFLUX DISEASE WITHOUT                     

 

 10/16/2017            NINA HENLEY MD, Ot            M19.91      
      PRIMARY OSTEOARTHRITIS, UNSPECIFIED SITE                     

 

 10/16/2017            NINA HENLEY MD, Ot            M54.9       
     DORSALGIA, UNSPECIFIED                     

 

 10/16/2017            NINA HENLEY MD, Ot            R06.03      
      ACUTE RESPIRATORY DISTRESS                     

 

 10/16/2017            NINA HENLEY MD, Ot            Z77.22      
      CNTCT W AND EXPSR TO ENVIRON TOBACCO SMO                     

 

 10/16/2017            NINA HENLEY MD, Ot            Z79.52      
      LONG TERM (CURRENT) USE OF SYSTEMIC STER                     

 

 10/16/2017            NINA HENLEY MD, Ot            Z87.891     
       PERSONAL HISTORY OF NICOTINE DEPENDENCE                     

 

 10/16/2017            NINA HENLEY MD, Ot            Z99.81      
      DEPENDENCE ON SUPPLEMENTAL OXYGEN                     

 

 10/17/2017            NINA HENLEY MD, Ot            F32.9       
     MAJOR DEPRESSIVE DISORDER, SINGLE EPISOD                     

 

 10/17/2017            NINA HENLEY MD, Ot            F41.9       
     ANXIETY DISORDER, UNSPECIFIED                     

 

 10/17/2017            NINA HENLEY MD, Ot            H26.9       
     UNSPECIFIED CATARACT                     

 

 10/17/2017            NINA HENLEY MD, Ot            J44.1       
     CHRONIC OBSTRUCTIVE PULMONARY DISEASE W                      

 

 10/17/2017            NINA HENLEY MD, Ot            J96.20      
      ACUTE AND CHR RESP FAILURE, UNSP W HYPOX                     

 

 10/17/2017            NINA HENLEY MD, Ot            K21.9       
     GASTRO-ESOPHAGEAL REFLUX DISEASE WITHOUT                     

 

 10/17/2017            NINA HENLEY MD, Ot            M19.91      
      PRIMARY OSTEOARTHRITIS, UNSPECIFIED SITE                     

 

 10/17/2017            NINA HENLEY MD, Ot            M54.9       
     DORSALGIA, UNSPECIFIED                     

 

 10/17/2017            NINA HENLEY MD, Ot            R06.03      
      ACUTE RESPIRATORY DISTRESS                     

 

 10/17/2017            NINA HENLEY MD, Ot            Z77.22      
      CNTCT W AND EXPSR TO ENVIRON TOBACCO SMO                     

 

 10/17/2017            NINA HENLEY MD, Ot            Z79.52      
      LONG TERM (CURRENT) USE OF SYSTEMIC STER                     

 

 10/17/2017            NINA HENLEY MD, Ot            Z87.891     
       PERSONAL HISTORY OF NICOTINE DEPENDENCE                     

 

 10/17/2017            NINA HENLEY MD            Ot            Z99.81      
      DEPENDENCE ON SUPPLEMENTAL OXYGEN                     

 

 10/18/2017            NINA HENLYE MD, Ot            F32.9       
     MAJOR DEPRESSIVE DISORDER, SINGLE EPISOD                     

 

 10/18/2017            NINA HENLEY MD, Ot            F41.9       
     ANXIETY DISORDER, UNSPECIFIED                     

 

 10/18/2017            NINA HENLEY MD, Ot            H26.9       
     UNSPECIFIED CATARACT                     

 

 10/18/2017            NINA HENLEY MD, Ot            J44.1       
     CHRONIC OBSTRUCTIVE PULMONARY DISEASE W                      

 

 10/18/2017            NINA HENLEY MD, Ot            J96.20      
      ACUTE AND CHR RESP FAILURE, UNSP W HYPOX                     

 

 10/18/2017            NINA HENLEY MD, Ot            K21.9       
     GASTRO-ESOPHAGEAL REFLUX DISEASE WITHOUT                     

 

 10/18/2017            NINA HENLEY MD, Ot            M19.91      
      PRIMARY OSTEOARTHRITIS, UNSPECIFIED SITE                     

 

 10/18/2017            NINA HENLEY MD, Ot            M54.9       
     DORSALGIA, UNSPECIFIED                     

 

 10/18/2017            NINA HENLEY MD, Ot            R06.03      
      ACUTE RESPIRATORY DISTRESS                     

 

 10/18/2017            NINA HENLEY MD, Ot            Z77.22      
      CNTCT W AND EXPSR TO ENVIRON TOBACCO SMO                     

 

 10/18/2017            NINA HENLEY MD, Ot            Z79.52      
      LONG TERM (CURRENT) USE OF SYSTEMIC STER                     

 

 10/18/2017            NINA HENLEY MD, Ot            Z87.891     
       PERSONAL HISTORY OF NICOTINE DEPENDENCE                     

 

 10/18/2017            NINA HENLEY MD, Ot            Z99.81      
      DEPENDENCE ON SUPPLEMENTAL OXYGEN                     

 

 10/19/2017            NINA HENLEY MD, Ot            F32.9       
     MAJOR DEPRESSIVE DISORDER, SINGLE EPISOD                     

 

 10/19/2017            NINA HENLEY MD, Ot            F41.9       
     ANXIETY DISORDER, UNSPECIFIED                     

 

 10/19/2017            NINA HENLEY MD            Ot            H26.9       
     UNSPECIFIED CATARACT                     

 

 10/19/2017            NINA HENLEY MD, Ot            J44.1       
     CHRONIC OBSTRUCTIVE PULMONARY DISEASE W                      

 

 10/19/2017            NINA HENLEY MD, Ot            J96.20      
      ACUTE AND CHR RESP FAILURE, UNSP W HYPOX                     

 

 10/19/2017            NINA HENLEY MD, Ot            K21.9       
     GASTRO-ESOPHAGEAL REFLUX DISEASE WITHOUT                     

 

 10/19/2017            NINA HENLEY MD, Ot            M19.91      
      PRIMARY OSTEOARTHRITIS, UNSPECIFIED SITE                     

 

 10/19/2017            NINA HENLEY MD, Ot            M54.9       
     DORSALGIA, UNSPECIFIED                     

 

 10/19/2017            NINA HENLEY MD            Ot            R06.03      
      ACUTE RESPIRATORY DISTRESS                     

 

 10/19/2017            NINA HENLEY MD, Ot            Z77.22      
      CNTCT W AND EXPSR TO ENVIRON TOBACCO SMO                     

 

 10/19/2017            NINA HENLEY MD, Ot            Z79.52      
      LONG TERM (CURRENT) USE OF SYSTEMIC STER                     

 

 10/19/2017            NINA HENLEY MD, Ot            Z87.891     
       PERSONAL HISTORY OF NICOTINE DEPENDENCE                     

 

 10/19/2017            NINA HENLEY MD, Ot            Z99.81      
      DEPENDENCE ON SUPPLEMENTAL OXYGEN                     

 

 10/19/2017            NINA HENLEY MD, Ot            F32.9       
     MAJOR DEPRESSIVE DISORDER, SINGLE EPISOD                     

 

 10/19/2017            NINA HENLEY MD, Ot            F41.9       
     ANXIETY DISORDER, UNSPECIFIED                     

 

 10/19/2017            NINA HENLEY MD, Ot            H26.9       
     UNSPECIFIED CATARACT                     

 

 10/19/2017            NINA HENLEY MD, Ot            J44.1       
     CHRONIC OBSTRUCTIVE PULMONARY DISEASE W                      

 

 10/19/2017            NINA HENLEY MD, Ot            J96.20      
      ACUTE AND CHR RESP FAILURE, UNSP W HYPOX                     

 

 10/19/2017            NINA HENLEY MD, Ot            K21.9       
     GASTRO-ESOPHAGEAL REFLUX DISEASE WITHOUT                     

 

 10/19/2017            NINA HENLEY MD, Ot            M19.91      
      PRIMARY OSTEOARTHRITIS, UNSPECIFIED SITE                     

 

 10/19/2017            NINA HENLEY MD, Ot            M54.9       
     DORSALGIA, UNSPECIFIED                     

 

 10/19/2017            NINA HENLEY MD, Ot            R06.03      
      ACUTE RESPIRATORY DISTRESS                     

 

 10/19/2017            NINA HENLEY MD, Ot            Z77.22      
      CNTCT W AND EXPSR TO ENVIRON TOBACCO SMO                     

 

 10/19/2017            NINA HENLEY MD, Ot            Z79.52      
      LONG TERM (CURRENT) USE OF SYSTEMIC STER                     

 

 10/19/2017            NINA HENLEY MD, Ot            Z87.891     
       PERSONAL HISTORY OF NICOTINE DEPENDENCE                     

 

 10/19/2017            NINA HENLEY MD, Ot            Z99.81      
      DEPENDENCE ON SUPPLEMENTAL OXYGEN                     

 

 10/20/2017            NINA HENLEY MD, Ot            F32.9       
     MAJOR DEPRESSIVE DISORDER, SINGLE EPISOD                     

 

 10/20/2017            NINA HENLEY MD, Ot            F41.9       
     ANXIETY DISORDER, UNSPECIFIED                     

 

 10/20/2017            NINA HENLEY MD, Ot            H26.9       
     UNSPECIFIED CATARACT                     

 

 10/20/2017            NINA HENLEY MD, Ot            J44.1       
     CHRONIC OBSTRUCTIVE PULMONARY DISEASE W                      

 

 10/20/2017            NINA HENLEY MD, Ot            J96.20      
      ACUTE AND CHR RESP FAILURE, UNSP W HYPOX                     

 

 10/20/2017            NINA HENLEY MD, Ot            K21.9       
     GASTRO-ESOPHAGEAL REFLUX DISEASE WITHOUT                     

 

 10/20/2017            NINA HENLEY MD, Ot            M19.91      
      PRIMARY OSTEOARTHRITIS, UNSPECIFIED SITE                     

 

 10/20/2017            NINA HENLEY MD, Ot            M54.9       
     DORSALGIA, UNSPECIFIED                     

 

 10/20/2017            NINA HENLEY MD, Ot            R06.03      
      ACUTE RESPIRATORY DISTRESS                     

 

 10/20/2017            NINA HENLEY MD, Ot            Z77.22      
      CNTCT W AND EXPSR TO ENVIRON TOBACCO SMO                     

 

 10/20/2017            NINA HENLEY MD, Ot            Z79.52      
      LONG TERM (CURRENT) USE OF SYSTEMIC STER                     

 

 10/20/2017            NINA HENLEY MD, Ot            Z87.891     
       PERSONAL HISTORY OF NICOTINE DEPENDENCE                     

 

 10/20/2017            NINA HENLEY MD, Ot            Z99.81      
      DEPENDENCE ON SUPPLEMENTAL OXYGEN                     

 

 10/20/2017            NINA HENLEY MD, Ot            F32.9       
     MAJOR DEPRESSIVE DISORDER, SINGLE EPISOD                     

 

 10/20/2017            NINA HENLEY MD, Ot            F41.0       
     PANIC DISORDER [EPISODIC PAROXYSMAL ANXI                     

 

 10/20/2017            NINA HENLEY MD, Ot            H26.9       
     UNSPECIFIED CATARACT                     

 

 10/20/2017            NINA HENLEY MD, Ot            J44.1       
     CHRONIC OBSTRUCTIVE PULMONARY DISEASE W                      

 

 10/20/2017            NINA HENLEY MD, Ot            J96.20      
      ACUTE AND CHR RESP FAILURE, UNSP W HYPOX                     

 

 10/20/2017            NINA HENLEY MD, Ot            K21.9       
     GASTRO-ESOPHAGEAL REFLUX DISEASE WITHOUT                     

 

 10/20/2017            NINA HENLEY MD, Ot            M19.91      
      PRIMARY OSTEOARTHRITIS, UNSPECIFIED SITE                     

 

 10/20/2017            NINA HENLEY MD, Ot            M54.9       
     DORSALGIA, UNSPECIFIED                     

 

 10/20/2017            NINA HENLEY MD            Ot            R06.03      
      ACUTE RESPIRATORY DISTRESS                     

 

 10/20/2017            NINA HENLEY MD, Ot            Z23         
   ENCOUNTER FOR IMMUNIZATION                     

 

 10/20/2017            NINA HENLEY MD, Ot            Z77.22      
      CNTCT W AND EXPSR TO ENVIRON TOBACCO SMO                     

 

 10/20/2017            NINA HENLEY MD, Ot            Z79.52      
      LONG TERM (CURRENT) USE OF SYSTEMIC STER                     

 

 10/20/2017            NINA HENLEY MD, Ot            Z87.891     
       PERSONAL HISTORY OF NICOTINE DEPENDENCE                     

 

 10/20/2017            NINA HENLEY MD, Ot            Z99.81      
      DEPENDENCE ON SUPPLEMENTAL OXYGEN                     

 

 2017            NINA HENLEY MD, Ot            J18.9       
     PNEUMONIA, UNSPECIFIED ORGANISM                     

 

 2017            NINA HENLEY MD, Ot            J18.9       
     PNEUMONIA, UNSPECIFIED ORGANISM                     

 

 2017                         Ot            V76.12                       
           

 

 2017                         Ot            787.20            DYSPHAGIA, 
UNSPECIFIED                     

 

 2017            NINA HENLEY MD, Ot            721.8       
     SPINAL DISORDERS NEC                     

 

 2017            NINA HENLEY MD            Ot            724.1       
     PAIN IN THORACIC SPINE                     

 

 2017            NINA HENLEY MD            Ot            733.00      
      OSTEOPOROSIS NOS                     

 

 2017            NINA HENLEY MD            Ot            V49.81      
      ASYMPT POSTMENOPAUSAL STATUS (AGE-RELATE                     

 

 2017            NINA HENLEY MD            Ot            V82.89      
      SCREEN FOR OTH SPECIF CONDITIONS                     

 

 2017            NINA HENLEY MD, Ot            721.3       
     LUMBOSACRAL SPONDYLOSIS                     

 

 2017            NINA HENLEY MD, Ot            724.1       
     PAIN IN THORACIC SPINE                     

 

 2017            NINA HENLEY MD            Ot            733.00      
      OSTEOPOROSIS NOS                     

 

 2017            NINA HENLEY MD            Ot            737.30      
      IDIOPATHIC SCOLIOSIS                     

 

 2017            NINA HENLEY MD, Ot            J18.9       
     PNEUMONIA, UNSPECIFIED ORGANISM                     

 

 2017            NINA HENLEY MD, Ot            J44.9       
     CHRONIC OBSTRUCTIVE PULMONARY DISEASE, U                     

 

 2017            LORY SCOTT NINA JACOBSON            Ot            R05         
   COUGH                     

 

 2017            LORY SCOTT, NINA JACOBSON            Ot            R91.8       
     OTHER NONSPECIFIC ABNORMAL FINDING OF ELYSE                     

 

 2017                         Ot            Z12.31            ENCNTR 
SCREEN MAMMOGRAM FOR MALIGNANT NE                     

 

 2017            LORY SCOTT, NINA JACOBSON            Ot            R05         
   COUGH                     

 

 2017            LORY SCOTT, NINA JACOBSON            Ot            R51         
   HEADACHE                     

 

 2017            NINA HENLEY MD            Ot            J18.9       
     PNEUMONIA, UNSPECIFIED ORGANISM                     

 

 2017            JOVON JOAQUIN DO            Ot            F41.9       
     ANXIETY DISORDER, UNSPECIFIED                     

 

 2017            JOVON JOAQUIN DO            Ot            J44.9       
     CHRONIC OBSTRUCTIVE PULMONARY DISEASE, U                     

 

 2017            EMANIJOVON BLANCO DO M            Ot            Z72.0       
     TOBACCO USE                     

 

 2017            JOVON JOAQUIN DO            Ot            F41.9       
     ANXIETY DISORDER, UNSPECIFIED                     

 

 2017            JOVON JOAQUIN DO            Ot            J44.9       
     CHRONIC OBSTRUCTIVE PULMONARY DISEASE, U                     

 

 2017            EMANIJOVON BLANCO DO M            Ot            Z72.0       
     TOBACCO USE                     

 

 2017            TIM JOAQUIN DOSON M            Ot            F41.9       
     ANXIETY DISORDER, UNSPECIFIED                     

 

 2017            JOVON JOAQUIN DO M            Ot            J44.9       
     CHRONIC OBSTRUCTIVE PULMONARY DISEASE, U                     

 

 2017            EMANITIM BLANCO DOSON M            Ot            Z72.0       
     TOBACCO USE                     

 

 2017            JOVON JOAQUIN DO M            Ot            F41.9       
     ANXIETY DISORDER, UNSPECIFIED                     

 

 2017            JOVON JOAQUIN DO            Ot            J44.9       
     CHRONIC OBSTRUCTIVE PULMONARY DISEASE, U                     

 

 2017            EMANIJOVON BLANCO DO M            Ot            Z72.0       
     TOBACCO USE                     

 

 2018            JOVON JOAQUIN DO M            Ot            F41.9       
     ANXIETY DISORDER, UNSPECIFIED                     

 

 2018            JOVON JOAQUIN DO M            Ot            J44.9       
     CHRONIC OBSTRUCTIVE PULMONARY DISEASE, U                     

 

 2018            EMANITIM BLANCO DOSON M            Ot            Z72.0       
     TOBACCO USE                     

 

 2018            TIM JOAQUIN DOSON M            Ot            F41.9       
     ANXIETY DISORDER, UNSPECIFIED                     

 

 2018            JOVON JOAQUIN DO M            Ot            J44.9       
     CHRONIC OBSTRUCTIVE PULMONARY DISEASE, U                     

 

 2018            EMANITIM BLANCO DOSON M            Ot            Z72.0       
     TOBACCO USE                     

 

 2018            NINA HENLEY MD, Ot            721.8       
     SPINAL DISORDERS NEC                     

 

 2018            NINA HENLEY MD, Ot            724.1       
     PAIN IN THORACIC SPINE                     

 

 2018            NINA HENLEY MD            Ot            733.00      
      OSTEOPOROSIS NOS                     

 

 2018            NINA HENLEY MD, Ot            V49.81      
      ASYMPT POSTMENOPAUSAL STATUS (AGE-RELATE                     

 

 2018            NINA HENLEY MD            Ot            V82.89      
      SCREEN FOR OTH SPECIF CONDITIONS                     

 

 2018            NINA HENLEY MD, Ot            721.3       
     LUMBOSACRAL SPONDYLOSIS                     

 

 2018            NINA HENLEY MD, Ot            724.1       
     PAIN IN THORACIC SPINE                     

 

 2018            NINA HENLEY MD, Ot            733.00      
      OSTEOPOROSIS NOS                     

 

 2018            NINA HENLEY MD            Ot            737.30      
      IDIOPATHIC SCOLIOSIS                     

 

 2018            JOVON JOAQUIN DO, Ot            F41.9       
     ANXIETY DISORDER, UNSPECIFIED                     

 

 2018            JOVON JOAQUIN DO, Ot            J44.9       
     CHRONIC OBSTRUCTIVE PULMONARY DISEASE, U                     

 

 2018            JOVON JOAQUIN DO, Ot            Z72.0       
     TOBACCO USE                     



                                                                               
                                                                               
                                                                               
                                                                               
                                                                               
                                                                               
                                                                               
                                                                               
                                                                            



Procedures

      





 Code            Description            Performed By            Performed On   
     

 

             38.93                                  VENOUS CATHETERIZATION NEC 
                                  2010        

 

             96.04                                  INSERT ENDOTRACHEAL TUBE   
                                2010        

 

             96.71                                  CONTINUOUS INVASIVE 
MECHANICAL VENTILATI                                   2010        



                      



Results

      





 Test            Result            Range        









 Complete blood count (CBC) with automated white blood cell (WBC) differential 
- 16 06:50         









 Blood leukocytes automated count (number/volume)            10.1 10*3/uL      
      4.3-11.0        

 

 Blood erythrocytes automated count (number/volume)            4.93 10*6/uL    
        4.35-5.85        

 

 Venous blood hemoglobin measurement (mass/volume)            15.3 g/dL        
    11.5-16.0        

 

 Blood hematocrit (volume fraction)            46 %            35-52        

 

 Automated erythrocyte mean corpuscular volume            93 [foz_us]          
  80-99        

 

 Automated erythrocyte mean corpuscular hemoglobin (mass per erythrocyte)      
      31 pg            25-34        

 

 Automated erythrocyte mean corpuscular hemoglobin concentration measurement (
mass/volume)            33 g/dL            32-36        

 

 Automated erythrocyte distribution width ratio            13.2 %            
10.0-14.5        

 

 Automated blood platelet count (count/volume)            242 10*3/uL          
  130-400        

 

 Automated blood platelet mean volume measurement            10.0 [foz_us]     
       7.4-10.4        

 

 Automated blood neutrophils/100 leukocytes            52 %            42-75   
     

 

 Automated blood lymphocytes/100 leukocytes            25 %            12-44   
     

 

 Blood monocytes/100 leukocytes            9 %            0-12        

 

 Automated blood eosinophils/100 leukocytes            13 %            0-10    
    

 

 Automated blood basophils/100 leukocytes            1 %            0-10        

 

 Blood neutrophils automated count (number/volume)            5.2 10*3         
   1.8-7.8        

 

 Blood lymphocytes automated count (number/volume)            2.6 10*3         
   1.0-4.0        

 

 Blood monocytes automated count (number/volume)            0.9 10*3            
0.0-1.0        

 

 Automated eosinophil count            1.3 10*3/uL            0.0-0.3        

 

 Automated blood basophil count (count/volume)            0.1 10*3/uL          
  0.0-0.1        









 Comprehensive metabolic panel - 16 06:50         









 Serum or plasma sodium measurement (moles/volume)            141 mmol/L       
     135-145        

 

 Serum or plasma potassium measurement (moles/volume)            4.4 mmol/L    
        3.6-5.0        

 

 Serum or plasma chloride measurement (moles/volume)            105 mmol/L     
               

 

 Carbon dioxide            26 mmol/L            21-32        

 

 Serum or plasma anion gap determination (moles/volume)            10 mmol/L   
         5-14        

 

 Serum or plasma urea nitrogen measurement (mass/volume)            10 mg/dL   
         7-18        

 

 Serum or plasma creatinine measurement (mass/volume)            0.95 mg/dL    
        0.60-1.30        

 

 Serum or plasma urea nitrogen/creatinine mass ratio            11             
NRG        

 

 Serum or plasma creatinine measurement with calculation of estimated 
glomerular filtration rate            60             NRG        

 

 Serum or plasma glucose measurement (mass/volume)            106 mg/dL        
            

 

 Serum or plasma calcium measurement (mass/volume)            9.6 mg/dL        
    8.5-10.1        

 

 Serum or plasma total bilirubin measurement (mass/volume)            0.5 mg/dL
            0.1-1.0        

 

 Serum or plasma alkaline phosphatase measurement (enzymatic activity/volume)  
          81 U/L                    

 

 Serum or plasma aspartate aminotransferase measurement (enzymatic activity/
volume)            17 U/L            5-34        

 

 Serum or plasma alanine aminotransferase measurement (enzymatic activity/volume
)            20 U/L            0-55        

 

 Serum or plasma protein measurement (mass/volume)            6.3 g/dL         
   6.4-8.2        

 

 Serum or plasma albumin measurement (mass/volume)            4.2 g/dL         
   3.2-4.5        









 Fibrin D-dimer FEU measurement in platelet poor plasma (mass/volume) -  06:50         









 Fibrin D-dimer FEU measurement in platelet poor plasma (mass/volume)          
  < ug/mL            0.00-0.49        









 Serum or plasma lithium measurement (moles/volume) - 16 06:50         









 BNP level            13.6 pg/mL            <100.0        









 Blood manual differential performed detection - 16 06:50         









 Blood monocytes/100 leukocytes            7 %            NRG        

 

 Manual blood segmented neutrophils/100 leukocytes            53 %            
NRG        

 

 Blood band neutrophils/100 leukocytes            0 %            NRG        

 

 Manual blood lymphocytes/100 leukocytes            33 %            NRG        

 

 Manual eosinophils/100 leukocytes in nose            7 %            NRG        

 

 Manual blood basophils/100 leukocytes            0 %            NRG        

 

 Blood erythrocyte morphology finding identification            NORMAL         
    NRG        









 Blood lactic acid measurement (moles/volume) - 16 07:46         









 Blood lactic acid measurement (moles/volume)            1.4 mmol/L            
0.5-2.0        









 Bacterial blood culture - 16 07:46         









 Bacterial blood culture            NG             NRG        









 Bacterial blood culture - 16 09:17         









 Bacterial blood culture            NG             NRG        









 Complete blood count (CBC) with automated white blood cell (WBC) differential 
- 16 05:53         









 Blood leukocytes automated count (number/volume)            20.5 10*3/uL      
      4.3-11.0        

 

 Blood erythrocytes automated count (number/volume)            4.33 10*6/uL    
        4.35-5.85        

 

 Venous blood hemoglobin measurement (mass/volume)            13.5 g/dL        
    11.5-16.0        

 

 Blood hematocrit (volume fraction)            41 %            35-52        

 

 Automated erythrocyte mean corpuscular volume            94 [foz_us]          
  80-99        

 

 Automated erythrocyte mean corpuscular hemoglobin (mass per erythrocyte)      
      31 pg            25-34        

 

 Automated erythrocyte mean corpuscular hemoglobin concentration measurement (
mass/volume)            33 g/dL            32-36        

 

 Automated erythrocyte distribution width ratio            13.2 %            
10.0-14.5        

 

 Automated blood platelet count (count/volume)            287 10*3/uL          
  130-400        

 

 Automated blood platelet mean volume measurement            9.4 [foz_us]      
      7.4-10.4        

 

 Automated blood neutrophils/100 leukocytes            94 %            42-75   
     

 

 Automated blood lymphocytes/100 leukocytes            4 %            12-44    
    

 

 Blood monocytes/100 leukocytes            2 %            0-12        

 

 Automated blood eosinophils/100 leukocytes            0 %            0-10     
   

 

 Automated blood basophils/100 leukocytes            0 %            0-10        

 

 Blood neutrophils automated count (number/volume)            19.3 10*3        
    1.8-7.8        

 

 Blood lymphocytes automated count (number/volume)            0.7 10*3         
   1.0-4.0        

 

 Blood monocytes automated count (number/volume)            0.5 10*3            
0.0-1.0        

 

 Automated eosinophil count            0.0 10*3/uL            0.0-0.3        

 

 Automated blood basophil count (count/volume)            0.0 10*3/uL          
  0.0-0.1        









 Complete blood count (CBC) with automated white blood cell (WBC) differential 
- 16 05:17         









 Blood leukocytes automated count (number/volume)            9.8 10*3/uL       
     4.3-11.0        

 

 Blood erythrocytes automated count (number/volume)            4.00 10*6/uL    
        4.35-5.85        

 

 Venous blood hemoglobin measurement (mass/volume)            12.5 g/dL        
    11.5-16.0        

 

 Blood hematocrit (volume fraction)            38 %            35-52        

 

 Automated erythrocyte mean corpuscular volume            96 [foz_us]          
  80-99        

 

 Automated erythrocyte mean corpuscular hemoglobin (mass per erythrocyte)      
      31 pg            25-34        

 

 Automated erythrocyte mean corpuscular hemoglobin concentration measurement (
mass/volume)            33 g/dL            32-36        

 

 Automated erythrocyte distribution width ratio            13.4 %            
10.0-14.5        

 

 Automated blood platelet count (count/volume)            253 10*3/uL          
  130-400        

 

 Automated blood platelet mean volume measurement            9.7 [foz_us]      
      7.4-10.4        

 

 Automated blood neutrophils/100 leukocytes            87 %            42-75   
     

 

 Automated blood lymphocytes/100 leukocytes            6 %            12-44    
    

 

 Blood monocytes/100 leukocytes            7 %            0-12        

 

 Automated blood eosinophils/100 leukocytes            0 %            0-10     
   

 

 Automated blood basophils/100 leukocytes            0 %            0-10        

 

 Blood neutrophils automated count (number/volume)            8.5 10*3         
   1.8-7.8        

 

 Blood lymphocytes automated count (number/volume)            0.6 10*3         
   1.0-4.0        

 

 Blood monocytes automated count (number/volume)            0.7 10*3            
0.0-1.0        

 

 Automated eosinophil count            0.0 10*3/uL            0.0-0.3        

 

 Automated blood basophil count (count/volume)            0.0 10*3/uL          
  0.0-0.1        









 Complete blood count (CBC) with automated white blood cell (WBC) differential 
- 16 12:20         









 Blood leukocytes automated count (number/volume)            5.4 10*3/uL       
     4.3-11.0        

 

 Blood erythrocytes automated count (number/volume)            4.54 10*6/uL    
        4.35-5.85        

 

 Venous blood hemoglobin measurement (mass/volume)            14.0 g/dL        
    11.5-16.0        

 

 Blood hematocrit (volume fraction)            42 %            35-52        

 

 Automated erythrocyte mean corpuscular volume            91 [foz_us]          
  80-99        

 

 Automated erythrocyte mean corpuscular hemoglobin (mass per erythrocyte)      
      31 pg            25-34        

 

 Automated erythrocyte mean corpuscular hemoglobin concentration measurement (
mass/volume)            34 g/dL            32-36        

 

 Automated erythrocyte distribution width ratio            13.3 %            
10.0-14.5        

 

 Automated blood platelet count (count/volume)            184 10*3/uL          
  130-400        

 

 Automated blood platelet mean volume measurement            9.4 [foz_us]      
      7.4-10.4        

 

 Automated blood neutrophils/100 leukocytes            69 %            42-75   
     

 

 Automated blood lymphocytes/100 leukocytes            16 %            12-44   
     

 

 Blood monocytes/100 leukocytes            11 %            0-12        

 

 Automated blood eosinophils/100 leukocytes            4 %            0-10     
   

 

 Automated blood basophils/100 leukocytes            1 %            0-10        

 

 Blood neutrophils automated count (number/volume)            3.7 10*3         
   1.8-7.8        

 

 Blood lymphocytes automated count (number/volume)            0.9 10*3         
   1.0-4.0        

 

 Blood monocytes automated count (number/volume)            0.6 10*3            
0.0-1.0        

 

 Automated eosinophil count            0.2 10*3/uL            0.0-0.3        

 

 Automated blood basophil count (count/volume)            0.0 10*3/uL          
  0.0-0.1        









 Comprehensive metabolic panel - 16 12:20         









 Serum or plasma sodium measurement (moles/volume)            137 mmol/L       
     135-145        

 

 Serum or plasma potassium measurement (moles/volume)            4.0 mmol/L    
        3.6-5.0        

 

 Serum or plasma chloride measurement (moles/volume)            103 mmol/L     
               

 

 Carbon dioxide            25 mmol/L            21-32        

 

 Serum or plasma anion gap determination (moles/volume)            9 mmol/L    
        5-14        

 

 Serum or plasma urea nitrogen measurement (mass/volume)            9 mg/dL    
        7-18        

 

 Serum or plasma creatinine measurement (mass/volume)            0.88 mg/dL    
        0.60-1.30        

 

 Serum or plasma urea nitrogen/creatinine mass ratio            10             
NRG        

 

 Serum or plasma creatinine measurement with calculation of estimated 
glomerular filtration rate            >             NRG        

 

 Serum or plasma glucose measurement (mass/volume)            110 mg/dL        
            

 

 Serum or plasma calcium measurement (mass/volume)            9.5 mg/dL        
    8.5-10.1        

 

 Serum or plasma total bilirubin measurement (mass/volume)            0.9 mg/dL
            0.1-1.0        

 

 Serum or plasma alkaline phosphatase measurement (enzymatic activity/volume)  
          74 U/L                    

 

 Serum or plasma aspartate aminotransferase measurement (enzymatic activity/
volume)            18 U/L            5-34        

 

 Serum or plasma alanine aminotransferase measurement (enzymatic activity/volume
)            23 U/L            0-55        

 

 Serum or plasma protein measurement (mass/volume)            6.3 g/dL         
   6.4-8.2        

 

 Serum or plasma albumin measurement (mass/volume)            4.2 g/dL         
   3.2-4.5        









 Complete blood count (CBC) with automated white blood cell (WBC) differential 
- 16 19:30         









 Blood leukocytes automated count (number/volume)            7.0 10*3/uL       
     4.3-11.0        

 

 Blood erythrocytes automated count (number/volume)            4.28 10*6/uL    
        4.35-5.85        

 

 Venous blood hemoglobin measurement (mass/volume)            13.2 g/dL        
    11.5-16.0        

 

 Blood hematocrit (volume fraction)            39 %            35-52        

 

 Automated erythrocyte mean corpuscular volume            91 [foz_us]          
  80-99        

 

 Automated erythrocyte mean corpuscular hemoglobin (mass per erythrocyte)      
      31 pg            25-34        

 

 Automated erythrocyte mean corpuscular hemoglobin concentration measurement (
mass/volume)            34 g/dL            32-36        

 

 Automated erythrocyte distribution width ratio            13.8 %            
10.0-14.5        

 

 Automated blood platelet count (count/volume)            453 10*3/uL          
  130-400        

 

 Automated blood platelet mean volume measurement            8.3 [foz_us]      
      7.4-10.4        

 

 Automated blood neutrophils/100 leukocytes            47 %            42-75   
     

 

 Automated blood lymphocytes/100 leukocytes            37 %            12-44   
     

 

 Blood monocytes/100 leukocytes            10 %            0-12        

 

 Automated blood eosinophils/100 leukocytes            3 %            0-10     
   

 

 Automated blood basophils/100 leukocytes            2 %            0-10        

 

 Blood neutrophils automated count (number/volume)            3.3 10*3         
   1.8-7.8        

 

 Blood lymphocytes automated count (number/volume)            2.6 10*3         
   1.0-4.0        

 

 Blood monocytes automated count (number/volume)            0.7 10*3            
0.0-1.0        

 

 Automated eosinophil count            0.2 10*3/uL            0.0-0.3        

 

 Automated blood basophil count (count/volume)            0.1 10*3/uL          
  0.0-0.1        









 PT panel in platelet poor plasma by coagulation assay - 16 19:30         









 Prothrombin time (PT) in platelet poor plasma by coagulation assay            
12.8 s            12.2-14.7        

 

 INR in platelet poor plasma or blood by coagulation assay            1.0      
       0.8-1.4        









 Activated partial thromboplastin time (aPTT) in platelet poor plasma 
bycoagulation assay - 16 19:30         









 Activated partial thromboplastin time (aPTT) in platelet poor plasma 
bycoagulation assay            28 s            24-35        









 Comprehensive metabolic panel - 16 19:30         









 Serum or plasma sodium measurement (moles/volume)            140 mmol/L       
     135-145        

 

 Serum or plasma potassium measurement (moles/volume)            3.3 mmol/L    
        3.6-5.0        

 

 Serum or plasma chloride measurement (moles/volume)            106 mmol/L     
               

 

 Carbon dioxide            27 mmol/L            21-32        

 

 Serum or plasma anion gap determination (moles/volume)            7 mmol/L    
        5-14        

 

 Serum or plasma urea nitrogen measurement (mass/volume)            11 mg/dL   
         7-18        

 

 Serum or plasma creatinine measurement (mass/volume)            0.75 mg/dL    
        0.60-1.30        

 

 Serum or plasma urea nitrogen/creatinine mass ratio            15             
NRG        

 

 Serum or plasma creatinine measurement with calculation of estimated 
glomerular filtration rate            >             NRG        

 

 Serum or plasma glucose measurement (mass/volume)            63 mg/dL         
           

 

 Serum or plasma calcium measurement (mass/volume)            9.5 mg/dL        
    8.5-10.1        

 

 Serum or plasma total bilirubin measurement (mass/volume)            0.5 mg/dL
            0.1-1.0        

 

 Serum or plasma alkaline phosphatase measurement (enzymatic activity/volume)  
          74 U/L                    

 

 Serum or plasma aspartate aminotransferase measurement (enzymatic activity/
volume)            17 U/L            5-34        

 

 Serum or plasma alanine aminotransferase measurement (enzymatic activity/volume
)            18 U/L            0-55        

 

 Serum or plasma protein measurement (mass/volume)            6.1 g/dL         
   6.4-8.2        

 

 Serum or plasma albumin measurement (mass/volume)            4.1 g/dL         
   3.2-4.5        









 Magnesium - 16 19:30         









 Magnesium            2.0 mg/dL            1.8-2.4        









 Serum or plasma creatine kinase measurement (enzymatic activity/volume) -  19:30         









 Serum or plasma creatine kinase measurement (enzymatic activity/volume)       
     81 U/L                    









 Serum or plasma lithium measurement (moles/volume) - 16 19:30         









 BNP level            13.6 pg/mL            <100.0        









 Serum or plasma creatine kinase MB measurement (enzymatic activity/volume) -  19:30         









 Serum or plasma creatine kinase MB measurement (enzymatic activity/volume)    
        3.7 ng/mL            <6.6        









 Serum or plasma troponin i.cardiac measurement (mass/volume) - 16 19:30 
        









 Serum or plasma troponin i.cardiac measurement (mass/volume)            < ng/
mL            <0.30        









 Serum or plasma thyrotropin measurement by detection limit <=0.05 miu/l (units/
volume) - 16 19:30         









 Serum or plasma thyrotropin measurement by detection limit <=0.05 miu/l (units/
volume)            4.09 u[iU]/mL            0.35-4.94        









 Complete blood count (CBC) with automated white blood cell (WBC) differential 
- 17 15:32         









 Blood leukocytes automated count (number/volume)            11.0 10*3/uL      
      4.3-11.0        

 

 Blood erythrocytes automated count (number/volume)            4.59 10*6/uL    
        4.35-5.85        

 

 Venous blood hemoglobin measurement (mass/volume)            13.9 g/dL        
    11.5-16.0        

 

 Blood hematocrit (volume fraction)            42 %            35-52        

 

 Automated erythrocyte mean corpuscular volume            91 [foz_us]          
  80-99        

 

 Automated erythrocyte mean corpuscular hemoglobin (mass per erythrocyte)      
      30 pg            25-34        

 

 Automated erythrocyte mean corpuscular hemoglobin concentration measurement (
mass/volume)            33 g/dL            32-36        

 

 Automated erythrocyte distribution width ratio            13.1 %            
10.0-14.5        

 

 Automated blood platelet count (count/volume)            397 10*3/uL          
  130-400        

 

 Automated blood platelet mean volume measurement            8.9 [foz_us]      
      7.4-10.4        

 

 Automated blood neutrophils/100 leukocytes            57 %            42-75   
     

 

 Automated blood lymphocytes/100 leukocytes            21 %            12-44   
     

 

 Blood monocytes/100 leukocytes            12 %            0-12        

 

 Automated blood eosinophils/100 leukocytes            10 %            0-10    
    

 

 Automated blood basophils/100 leukocytes            1 %            0-10        

 

 Blood neutrophils automated count (number/volume)            6.3 10*3         
   1.8-7.8        

 

 Blood lymphocytes automated count (number/volume)            2.3 10*3         
   1.0-4.0        

 

 Blood monocytes automated count (number/volume)            1.3 10*3            
0.0-1.0        

 

 Automated eosinophil count            1.1 10*3/uL            0.0-0.3        

 

 Automated blood basophil count (count/volume)            0.1 10*3/uL          
  0.0-0.1        









 Comprehensive metabolic panel - 17 15:32         









 Serum or plasma sodium measurement (moles/volume)            138 mmol/L       
     135-145        

 

 Serum or plasma potassium measurement (moles/volume)            4.4 mmol/L    
        3.6-5.0        

 

 Serum or plasma chloride measurement (moles/volume)            100 mmol/L     
               

 

 Carbon dioxide            30 mmol/L            21-32        

 

 Serum or plasma anion gap determination (moles/volume)            8 mmol/L    
        5-14        

 

 Serum or plasma urea nitrogen measurement (mass/volume)            22 mg/dL   
         7-18        

 

 Serum or plasma creatinine measurement (mass/volume)            0.84 mg/dL    
        0.60-1.30        

 

 Serum or plasma urea nitrogen/creatinine mass ratio            26             
NRG        

 

 Serum or plasma creatinine measurement with calculation of estimated 
glomerular filtration rate            >             NRG        

 

 Serum or plasma glucose measurement (mass/volume)            103 mg/dL        
            

 

 Serum or plasma calcium measurement (mass/volume)            10.0 mg/dL       
     8.5-10.1        

 

 Serum or plasma total bilirubin measurement (mass/volume)            0.4 mg/dL
            0.1-1.0        

 

 Serum or plasma alkaline phosphatase measurement (enzymatic activity/volume)  
          76 U/L                    

 

 Serum or plasma aspartate aminotransferase measurement (enzymatic activity/
volume)            17 U/L            5-34        

 

 Serum or plasma alanine aminotransferase measurement (enzymatic activity/volume
)            16 U/L            0-55        

 

 Serum or plasma protein measurement (mass/volume)            7.0 g/dL         
   6.4-8.2        

 

 Serum or plasma albumin measurement (mass/volume)            4.0 g/dL         
   3.2-4.5        









 Arterial blood gas measurement - 17 15:50         









 Blood pCO2            37 mm[Hg]            35-45        

 

 Blood pO2            92 mm[Hg]            79-93        

 

 Arterial blood bicarbonate measurement (moles/volume)            27 mmol/L    
        23-27        

 

 Arterial blood base excess by calculation            2.5 mmol/L            -2.5
-2.5        

 

 Arterial blood oxygen saturation measurement            98 %            
        

 

 * Inhaled oxygen flow rate            2L             NRG        

 

 Arterial blood pH measurement with patient temperature correction            
7.47             7.37-7.43        

 

 Arterial blood carbon dioxide, total measurement (moles/volume)            
27.8 mmol/L            21.0-31.0        

 

 Body site            RIGHT RADIAL             NRG        

 

 Assessment of wrist artery patency prior to arterial puncture            
POSITIVE             NRG        

 

 Setting of ventilation mode            NO             NRG        

 

 Measurement of body temperature            96.0             NRG        









 Complete blood count (CBC) with automated white blood cell (WBC) differential 
- 10/13/17 02:45         









 Blood leukocytes automated count (number/volume)            12.8 10*3/uL      
      4.3-11.0        

 

 Blood erythrocytes automated count (number/volume)            4.93 10*6/uL    
        4.35-5.85        

 

 Venous blood hemoglobin measurement (mass/volume)            14.7 g/dL        
    11.5-16.0        

 

 Blood hematocrit (volume fraction)            46 %            35-52        

 

 Automated erythrocyte mean corpuscular volume            93 [foz_us]          
  80-99        

 

 Automated erythrocyte mean corpuscular hemoglobin (mass per erythrocyte)      
      30 pg            25-34        

 

 Automated erythrocyte mean corpuscular hemoglobin concentration measurement (
mass/volume)            32 g/dL            32-36        

 

 Automated erythrocyte distribution width ratio            13.8 %            
10.0-14.5        

 

 Automated blood platelet count (count/volume)            259 10*3/uL          
  130-400        

 

 Automated blood platelet mean volume measurement            9.0 [foz_us]      
      7.4-10.4        

 

 Automated blood neutrophils/100 leukocytes            66 %            42-75   
     

 

 Automated blood lymphocytes/100 leukocytes            21 %            12-44   
     

 

 Blood monocytes/100 leukocytes            10 %            0-12        

 

 Automated blood eosinophils/100 leukocytes            3 %            0-10     
   

 

 Automated blood basophils/100 leukocytes            1 %            0-10        

 

 Blood neutrophils automated count (number/volume)            8.4 10*3         
   1.8-7.8        

 

 Blood lymphocytes automated count (number/volume)            2.7 10*3         
   1.0-4.0        

 

 Blood monocytes automated count (number/volume)            1.3 10*3            
0.0-1.0        

 

 Automated eosinophil count            0.4 10*3/uL            0.0-0.3        

 

 Automated blood basophil count (count/volume)            0.1 10*3/uL          
  0.0-0.1        









 Comprehensive metabolic panel - 10/13/17 02:45         









 Serum or plasma sodium measurement (moles/volume)            138 mmol/L       
     135-145        

 

 Serum or plasma potassium measurement (moles/volume)            4.1 mmol/L    
        3.6-5.0        

 

 Serum or plasma chloride measurement (moles/volume)            100 mmol/L     
               

 

 Carbon dioxide            24 mmol/L            21-32        

 

 Serum or plasma anion gap determination (moles/volume)            14 mmol/L   
         5-14        

 

 Serum or plasma urea nitrogen measurement (mass/volume)            9 mg/dL    
        7-18        

 

 Serum or plasma creatinine measurement (mass/volume)            1.03 mg/dL    
        0.60-1.30        

 

 Serum or plasma urea nitrogen/creatinine mass ratio            9             
NRG        

 

 Serum or plasma creatinine measurement with calculation of estimated 
glomerular filtration rate            54             NRG        

 

 Serum or plasma glucose measurement (mass/volume)            81 mg/dL         
           

 

 Serum or plasma calcium measurement (mass/volume)            9.3 mg/dL        
    8.5-10.1        

 

 Serum or plasma total bilirubin measurement (mass/volume)            0.4 mg/dL
            0.1-1.0        

 

 Serum or plasma alkaline phosphatase measurement (enzymatic activity/volume)  
          86 U/L                    

 

 Serum or plasma aspartate aminotransferase measurement (enzymatic activity/
volume)            26 U/L            5-34        

 

 Serum or plasma alanine aminotransferase measurement (enzymatic activity/volume
)            25 U/L            0-55        

 

 Serum or plasma protein measurement (mass/volume)            6.9 g/dL         
   6.4-8.2        

 

 Serum or plasma albumin measurement (mass/volume)            4.3 g/dL         
   3.2-4.5        









 Serum or plasma C reactive protein measurement (mass/volume) - 10/13/17 02:45 
        









 Serum or plasma C reactive protein measurement (mass/volume)            0.27 mg
/dL            0.00-0.50        









 Arterial blood gas measurement - 10/13/17 03:03         









 Blood pCO2            49 mm[Hg]            35-45        

 

 Blood pO2            151 mm[Hg]            79-93        

 

 Arterial blood bicarbonate measurement (moles/volume)            29 mmol/L    
        23-27        

 

 Arterial blood base excess by calculation            4.1 mmol/L            -2.5
-2.5        

 

 Arterial blood oxygen saturation measurement            99 %            
        

 

 * Inhaled oxygen flow rate            6L             NRG        

 

 Arterial blood pH measurement with patient temperature correction            
7.38             7.37-7.43        

 

 Arterial blood carbon dioxide, total measurement (moles/volume)            
30.6 mmol/L            21.0-31.0        

 

 Body site            R RAD             NRG        

 

 Assessment of wrist artery patency prior to arterial puncture            YES-
POS             NRG        

 

 Setting of ventilation mode            NO             NRG        

 

 Measurement of body temperature            97.2             NRG        









 Methicillin resistant Staphylococcus aureus (MRSA) screening culture - 10/13/
17 08:40         









 Methicillin resistant Staphylococcus aureus (MRSA) screening culture          
  NEG             NRG        









 Complete blood count (CBC) with automated white blood cell (WBC) differential 
- 10/14/17 03:49         









 Blood leukocytes automated count (number/volume)            5.5 10*3/uL       
     4.3-11.0        

 

 Blood erythrocytes automated count (number/volume)            4.47 10*6/uL    
        4.35-5.85        

 

 Venous blood hemoglobin measurement (mass/volume)            13.4 g/dL        
    11.5-16.0        

 

 Blood hematocrit (volume fraction)            41 %            35-52        

 

 Automated erythrocyte mean corpuscular volume            93 [foz_us]          
  80-99        

 

 Automated erythrocyte mean corpuscular hemoglobin (mass per erythrocyte)      
      30 pg            25-34        

 

 Automated erythrocyte mean corpuscular hemoglobin concentration measurement (
mass/volume)            32 g/dL            32-36        

 

 Automated erythrocyte distribution width ratio            13.7 %            
10.0-14.5        

 

 Automated blood platelet count (count/volume)            221 10*3/uL          
  130-400        

 

 Automated blood platelet mean volume measurement            9.0 [foz_us]      
      7.4-10.4        

 

 Automated blood neutrophils/100 leukocytes            86 %            42-75   
     

 

 Automated blood lymphocytes/100 leukocytes            9 %            12-44    
    

 

 Blood monocytes/100 leukocytes            4 %            0-12        

 

 Automated blood eosinophils/100 leukocytes            0 %            0-10     
   

 

 Automated blood basophils/100 leukocytes            0 %            0-10        

 

 Blood neutrophils automated count (number/volume)            4.8 10*3         
   1.8-7.8        

 

 Blood lymphocytes automated count (number/volume)            0.5 10*3         
   1.0-4.0        

 

 Blood monocytes automated count (number/volume)            0.2 10*3            
0.0-1.0        

 

 Automated eosinophil count            0.0 10*3/uL            0.0-0.3        

 

 Automated blood basophil count (count/volume)            0.0 10*3/uL          
  0.0-0.1        









 Whole blood basic metabolic panel - 10/14/17 03:49         









 Serum or plasma sodium measurement (moles/volume)            140 mmol/L       
     135-145        

 

 Serum or plasma potassium measurement (moles/volume)            3.9 mmol/L    
        3.6-5.0        

 

 Serum or plasma chloride measurement (moles/volume)            106 mmol/L     
               

 

 Carbon dioxide            24 mmol/L            21-32        

 

 Serum or plasma anion gap determination (moles/volume)            10 mmol/L   
         5-14        

 

 Serum or plasma urea nitrogen measurement (mass/volume)            19 mg/dL   
         7-18        

 

 Serum or plasma creatinine measurement (mass/volume)            0.91 mg/dL    
        0.60-1.30        

 

 Serum or plasma urea nitrogen/creatinine mass ratio            21             
NRG        

 

 Serum or plasma creatinine measurement with calculation of estimated 
glomerular filtration rate            >             NRG        

 

 Serum or plasma glucose measurement (mass/volume)            136 mg/dL        
            

 

 Serum or plasma calcium measurement (mass/volume)            8.7 mg/dL        
    8.5-10.1        









 Serum or plasma phosphate measurement (mass/volume) - 10/14/17 03:49         









 Serum or plasma phosphate measurement (mass/volume)            2.7 mg/dL      
      2.3-4.7        









 Magnesium - 10/14/17 03:49         









 Magnesium            2.4 mg/dL            1.8-2.4        









 Complete blood count (CBC) with automated white blood cell (WBC) differential 
- 10/15/17 04:40         









 Blood leukocytes automated count (number/volume)            13.7 10*3/uL      
      4.3-11.0        

 

 Blood erythrocytes automated count (number/volume)            4.00 10*6/uL    
        4.35-5.85        

 

 Venous blood hemoglobin measurement (mass/volume)            12.0 g/dL        
    11.5-16.0        

 

 Blood hematocrit (volume fraction)            38 %            35-52        

 

 Automated erythrocyte mean corpuscular volume            94 [foz_us]          
  80-99        

 

 Automated erythrocyte mean corpuscular hemoglobin (mass per erythrocyte)      
      30 pg            25-34        

 

 Automated erythrocyte mean corpuscular hemoglobin concentration measurement (
mass/volume)            32 g/dL            32-36        

 

 Automated erythrocyte distribution width ratio            13.9 %            
10.0-14.5        

 

 Automated blood platelet count (count/volume)            204 10*3/uL          
  130-400        

 

 Automated blood platelet mean volume measurement            9.1 [foz_us]      
      7.4-10.4        

 

 Automated blood neutrophils/100 leukocytes            91 %            42-75   
     

 

 Automated blood lymphocytes/100 leukocytes            4 %            12-44    
    

 

 Blood monocytes/100 leukocytes            5 %            0-12        

 

 Automated blood eosinophils/100 leukocytes            0 %            0-10     
   

 

 Automated blood basophils/100 leukocytes            0 %            0-10        

 

 Blood neutrophils automated count (number/volume)            12.4 10*3        
    1.8-7.8        

 

 Blood lymphocytes automated count (number/volume)            0.6 10*3         
   1.0-4.0        

 

 Blood monocytes automated count (number/volume)            0.7 10*3            
0.0-1.0        

 

 Automated eosinophil count            0.0 10*3/uL            0.0-0.3        

 

 Automated blood basophil count (count/volume)            0.0 10*3/uL          
  0.0-0.1        









 Whole blood basic metabolic panel - 10/15/17 04:40         









 Serum or plasma sodium measurement (moles/volume)            141 mmol/L       
     135-145        

 

 Serum or plasma potassium measurement (moles/volume)            3.8 mmol/L    
        3.6-5.0        

 

 Serum or plasma chloride measurement (moles/volume)            109 mmol/L     
               

 

 Carbon dioxide            23 mmol/L            21-32        

 

 Serum or plasma anion gap determination (moles/volume)            9 mmol/L    
        5-14        

 

 Serum or plasma urea nitrogen measurement (mass/volume)            18 mg/dL   
         7-18        

 

 Serum or plasma creatinine measurement (mass/volume)            0.82 mg/dL    
        0.60-1.30        

 

 Serum or plasma urea nitrogen/creatinine mass ratio            22             
NRG        

 

 Serum or plasma creatinine measurement with calculation of estimated 
glomerular filtration rate            >             NRG        

 

 Serum or plasma glucose measurement (mass/volume)            120 mg/dL        
            

 

 Serum or plasma calcium measurement (mass/volume)            8.5 mg/dL        
    8.5-10.1        









 Serum or plasma phosphate measurement (mass/volume) - 10/15/17 04:40         









 Serum or plasma phosphate measurement (mass/volume)            2.5 mg/dL      
      2.3-4.7        









 Magnesium - 10/15/17 04:40         









 Magnesium            2.3 mg/dL            1.8-2.4        









 Blood manual differential performed detection - 10/15/17 04:40         









 Blood monocytes/100 leukocytes            1 %            NRG        

 

 Manual blood segmented neutrophils/100 leukocytes            90 %            
NRG        

 

 Blood band neutrophils/100 leukocytes            2 %            NRG        

 

 Manual blood lymphocytes/100 leukocytes            5 %            NRG        

 

 Manual eosinophils/100 leukocytes in nose            0 %            NRG        

 

 Manual blood basophils/100 leukocytes            0 %            NRG        

 

 Blood lymphocytes variant/100 leukocytes            2 %            NRG        

 

 Blood erythrocyte morphology finding identification            NORMAL         
    NRG        









 Complete blood count (CBC) with automated white blood cell (WBC) differential 
- 10/16/17 04:35         









 Blood leukocytes automated count (number/volume)            10.6 10*3/uL      
      4.3-11.0        

 

 Blood erythrocytes automated count (number/volume)            3.99 10*6/uL    
        4.35-5.85        

 

 Venous blood hemoglobin measurement (mass/volume)            11.9 g/dL        
    11.5-16.0        

 

 Blood hematocrit (volume fraction)            38 %            35-52        

 

 Automated erythrocyte mean corpuscular volume            94 [foz_us]          
  80-99        

 

 Automated erythrocyte mean corpuscular hemoglobin (mass per erythrocyte)      
      30 pg            25-34        

 

 Automated erythrocyte mean corpuscular hemoglobin concentration measurement (
mass/volume)            32 g/dL            32-36        

 

 Automated erythrocyte distribution width ratio            13.6 %            
10.0-14.5        

 

 Automated blood platelet count (count/volume)            195 10*3/uL          
  130-400        

 

 Automated blood platelet mean volume measurement            8.9 [foz_us]      
      7.4-10.4        

 

 Automated blood neutrophils/100 leukocytes            94 %            42-75   
     

 

 Automated blood lymphocytes/100 leukocytes            4 %            12-44    
    

 

 Blood monocytes/100 leukocytes            3 %            0-12        

 

 Automated blood eosinophils/100 leukocytes            0 %            0-10     
   

 

 Automated blood basophils/100 leukocytes            0 %            0-10        

 

 Blood neutrophils automated count (number/volume)            10.0 10*3        
    1.8-7.8        

 

 Blood lymphocytes automated count (number/volume)            0.4 10*3         
   1.0-4.0        

 

 Blood monocytes automated count (number/volume)            0.3 10*3            
0.0-1.0        

 

 Automated eosinophil count            0.0 10*3/uL            0.0-0.3        

 

 Automated blood basophil count (count/volume)            0.0 10*3/uL          
  0.0-0.1        









 Whole blood basic metabolic panel - 10/16/17 04:35         









 Serum or plasma sodium measurement (moles/volume)            141 mmol/L       
     135-145        

 

 Serum or plasma potassium measurement (moles/volume)            3.6 mmol/L    
        3.6-5.0        

 

 Serum or plasma chloride measurement (moles/volume)            107 mmol/L     
               

 

 Carbon dioxide            25 mmol/L            21-32        

 

 Serum or plasma anion gap determination (moles/volume)            9 mmol/L    
        5-14        

 

 Serum or plasma urea nitrogen measurement (mass/volume)            19 mg/dL   
         7-18        

 

 Serum or plasma creatinine measurement (mass/volume)            0.73 mg/dL    
        0.60-1.30        

 

 Serum or plasma urea nitrogen/creatinine mass ratio            26             
NRG        

 

 Serum or plasma creatinine measurement with calculation of estimated 
glomerular filtration rate            >             NRG        

 

 Serum or plasma glucose measurement (mass/volume)            128 mg/dL        
            

 

 Serum or plasma calcium measurement (mass/volume)            8.2 mg/dL        
    8.5-10.1        









 Serum or plasma phosphate measurement (mass/volume) - 10/16/17 04:35         









 Serum or plasma phosphate measurement (mass/volume)            2.4 mg/dL      
      2.3-4.7        









 Magnesium - 10/16/17 04:35         









 Magnesium            2.2 mg/dL            1.8-2.4        









 Arterial blood gas measurement - 10/16/17 06:45         









 Blood pCO2            46 mm[Hg]            35-45        

 

 Blood pO2            81 mm[Hg]            79-93        

 

 Arterial blood bicarbonate measurement (moles/volume)            27 mmol/L    
        23-27        

 

 Arterial blood base excess by calculation            2.4 mmol/L            -2.5
-2.5        

 

 Arterial blood oxygen saturation measurement            97 %            
        

 

 * Inhaled oxygen flow rate            30% BIPAP             NRG        

 

 Arterial blood pH measurement with patient temperature correction            
7.39             7.37-7.43        

 

 Arterial blood carbon dioxide, total measurement (moles/volume)            
28.6 mmol/L            21.0-31.0        

 

 Body site            R RAD             NRG        

 

 Assessment of wrist artery patency prior to arterial puncture            YES-
POS             NRG        

 

 Setting of ventilation mode            NO             NRG        

 

 Measurement of body temperature            97.3             NRG        









 Complete blood count (CBC) with automated white blood cell (WBC) differential 
- 10/17/17 05:20         









 Blood leukocytes automated count (number/volume)            12.0 10*3/uL      
      4.3-11.0        

 

 Blood erythrocytes automated count (number/volume)            4.50 10*6/uL    
        4.35-5.85        

 

 Venous blood hemoglobin measurement (mass/volume)            13.5 g/dL        
    11.5-16.0        

 

 Blood hematocrit (volume fraction)            42 %            35-52        

 

 Automated erythrocyte mean corpuscular volume            93 [foz_us]          
  80-99        

 

 Automated erythrocyte mean corpuscular hemoglobin (mass per erythrocyte)      
      30 pg            25-34        

 

 Automated erythrocyte mean corpuscular hemoglobin concentration measurement (
mass/volume)            32 g/dL            32-36        

 

 Automated erythrocyte distribution width ratio            13.6 %            
10.0-14.5        

 

 Automated blood platelet count (count/volume)            220 10*3/uL          
  130-400        

 

 Automated blood platelet mean volume measurement            9.2 [foz_us]      
      7.4-10.4        

 

 Automated blood neutrophils/100 leukocytes            93 %            42-75   
     

 

 Automated blood lymphocytes/100 leukocytes            3 %            12-44    
    

 

 Blood monocytes/100 leukocytes            4 %            0-12        

 

 Automated blood eosinophils/100 leukocytes            0 %            0-10     
   

 

 Automated blood basophils/100 leukocytes            0 %            0-10        

 

 Blood neutrophils automated count (number/volume)            11.2 10*3        
    1.8-7.8        

 

 Blood lymphocytes automated count (number/volume)            0.4 10*3         
   1.0-4.0        

 

 Blood monocytes automated count (number/volume)            0.4 10*3            
0.0-1.0        

 

 Automated eosinophil count            0.0 10*3/uL            0.0-0.3        

 

 Automated blood basophil count (count/volume)            0.0 10*3/uL          
  0.0-0.1        









 Whole blood basic metabolic panel - 10/17/17 05:20         









 Serum or plasma sodium measurement (moles/volume)            140 mmol/L       
     135-145        

 

 Serum or plasma potassium measurement (moles/volume)            3.9 mmol/L    
        3.6-5.0        

 

 Serum or plasma chloride measurement (moles/volume)            100 mmol/L     
               

 

 Carbon dioxide            27 mmol/L            21-32        

 

 Serum or plasma anion gap determination (moles/volume)            13 mmol/L   
         5-14        

 

 Serum or plasma urea nitrogen measurement (mass/volume)            20 mg/dL   
         7-18        

 

 Serum or plasma creatinine measurement (mass/volume)            0.78 mg/dL    
        0.60-1.30        

 

 Serum or plasma urea nitrogen/creatinine mass ratio            26             
NRG        

 

 Serum or plasma creatinine measurement with calculation of estimated 
glomerular filtration rate            >             NRG        

 

 Serum or plasma glucose measurement (mass/volume)            131 mg/dL        
            

 

 Serum or plasma calcium measurement (mass/volume)            9.0 mg/dL        
    8.5-10.1        









 Serum or plasma phosphate measurement (mass/volume) - 10/17/17 05:20         









 Serum or plasma phosphate measurement (mass/volume)            2.5 mg/dL      
      2.3-4.7        









 Magnesium - 10/17/17 05:20         









 Magnesium            2.4 mg/dL            1.8-2.4        









 Arterial blood gas measurement - 10/18/17 04:05         









 Blood pCO2            47 mm[Hg]            35-45        

 

 Blood pO2            62 mm[Hg]            79-93        

 

 Arterial blood bicarbonate measurement (moles/volume)            32 mmol/L    
        23-27        

 

 Arterial blood base excess by calculation            7.6 mmol/L            -2.5
-2.5        

 

 Arterial blood oxygen saturation measurement            93 %            
        

 

 * Inhaled oxygen flow rate            25% BIPAP             NRG        

 

 Arterial blood pH measurement with patient temperature correction            
7.45             7.37-7.43        

 

 Arterial blood carbon dioxide, total measurement (moles/volume)            
33.4 mmol/L            21.0-31.0        

 

 Body site            R RAD             NRG        

 

 Assessment of wrist artery patency prior to arterial puncture            YES-
POS             NRG        

 

 Setting of ventilation mode            NO             NRG        

 

 Measurement of body temperature            97.2             NRG        









 Complete blood count (CBC) with automated white blood cell (WBC) differential 
- 10/18/17 04:30         









 Blood leukocytes automated count (number/volume)            10.9 10*3/uL      
      4.3-11.0        

 

 Blood erythrocytes automated count (number/volume)            4.71 10*6/uL    
        4.35-5.85        

 

 Venous blood hemoglobin measurement (mass/volume)            14.1 g/dL        
    11.5-16.0        

 

 Blood hematocrit (volume fraction)            43 %            35-52        

 

 Automated erythrocyte mean corpuscular volume            92 [foz_us]          
  80-99        

 

 Automated erythrocyte mean corpuscular hemoglobin (mass per erythrocyte)      
      30 pg            25-34        

 

 Automated erythrocyte mean corpuscular hemoglobin concentration measurement (
mass/volume)            33 g/dL            32-36        

 

 Automated erythrocyte distribution width ratio            13.6 %            
10.0-14.5        

 

 Automated blood platelet count (count/volume)            220 10*3/uL          
  130-400        

 

 Automated blood platelet mean volume measurement            9.0 [foz_us]      
      7.4-10.4        

 

 Automated blood neutrophils/100 leukocytes            91 %            42-75   
     

 

 Automated blood lymphocytes/100 leukocytes            4 %            12-44    
    

 

 Blood monocytes/100 leukocytes            5 %            0-12        

 

 Automated blood eosinophils/100 leukocytes            0 %            0-10     
   

 

 Automated blood basophils/100 leukocytes            0 %            0-10        

 

 Blood neutrophils automated count (number/volume)            9.9 10*3         
   1.8-7.8        

 

 Blood lymphocytes automated count (number/volume)            0.5 10*3         
   1.0-4.0        

 

 Blood monocytes automated count (number/volume)            0.5 10*3            
0.0-1.0        

 

 Automated eosinophil count            0.0 10*3/uL            0.0-0.3        

 

 Automated blood basophil count (count/volume)            0.0 10*3/uL          
  0.0-0.1        









 Whole blood basic metabolic panel - 10/18/17 04:30         









 Serum or plasma sodium measurement (moles/volume)            139 mmol/L       
     135-145        

 

 Serum or plasma potassium measurement (moles/volume)            3.9 mmol/L    
        3.6-5.0        

 

 Serum or plasma chloride measurement (moles/volume)            99 mmol/L      
              

 

 Carbon dioxide            30 mmol/L            21-32        

 

 Serum or plasma anion gap determination (moles/volume)            10 mmol/L   
         5-14        

 

 Serum or plasma urea nitrogen measurement (mass/volume)            22 mg/dL   
         7-18        

 

 Serum or plasma creatinine measurement (mass/volume)            0.76 mg/dL    
        0.60-1.30        

 

 Serum or plasma urea nitrogen/creatinine mass ratio            29             
NRG        

 

 Serum or plasma creatinine measurement with calculation of estimated 
glomerular filtration rate            >             NRG        

 

 Serum or plasma glucose measurement (mass/volume)            125 mg/dL        
            

 

 Serum or plasma calcium measurement (mass/volume)            9.2 mg/dL        
    8.5-10.1        









 Serum or plasma phosphate measurement (mass/volume) - 10/18/17 04:30         









 Serum or plasma phosphate measurement (mass/volume)            1.9 mg/dL      
      2.3-4.7        









 Magnesium - 10/18/17 04:30         









 Magnesium            2.5 mg/dL            1.8-2.4        









 Complete blood count (CBC) with automated white blood cell (WBC) differential 
- 10/19/17 05:00         









 Blood leukocytes automated count (number/volume)            13.0 10*3/uL      
      4.3-11.0        

 

 Blood erythrocytes automated count (number/volume)            4.89 10*6/uL    
        4.35-5.85        

 

 Venous blood hemoglobin measurement (mass/volume)            14.4 g/dL        
    11.5-16.0        

 

 Blood hematocrit (volume fraction)            44 %            35-52        

 

 Automated erythrocyte mean corpuscular volume            91 [foz_us]          
  80-99        

 

 Automated erythrocyte mean corpuscular hemoglobin (mass per erythrocyte)      
      29 pg            25-34        

 

 Automated erythrocyte mean corpuscular hemoglobin concentration measurement (
mass/volume)            32 g/dL            32-36        

 

 Automated erythrocyte distribution width ratio            13.8 %            
10.0-14.5        

 

 Automated blood platelet count (count/volume)            246 10*3/uL          
  130-400        

 

 Automated blood platelet mean volume measurement            9.0 [foz_us]      
      7.4-10.4        

 

 Automated blood neutrophils/100 leukocytes            91 %            42-75   
     

 

 Automated blood lymphocytes/100 leukocytes            4 %            12-44    
    

 

 Blood monocytes/100 leukocytes            5 %            0-12        

 

 Automated blood eosinophils/100 leukocytes            0 %            0-10     
   

 

 Automated blood basophils/100 leukocytes            0 %            0-10        

 

 Blood neutrophils automated count (number/volume)            11.7 10*3        
    1.8-7.8        

 

 Blood lymphocytes automated count (number/volume)            0.6 10*3         
   1.0-4.0        

 

 Blood monocytes automated count (number/volume)            0.7 10*3            
0.0-1.0        

 

 Automated eosinophil count            0.0 10*3/uL            0.0-0.3        

 

 Automated blood basophil count (count/volume)            0.0 10*3/uL          
  0.0-0.1        









 Blood manual differential performed detection - 10/19/17 05:00         









 Blood monocytes/100 leukocytes            4 %            NRG        

 

 Manual blood segmented neutrophils/100 leukocytes            93 %            
NRG        

 

 Blood band neutrophils/100 leukocytes            0 %            NRG        

 

 Manual blood lymphocytes/100 leukocytes            3 %            NRG        

 

 Manual eosinophils/100 leukocytes in nose            0 %            NRG        

 

 Manual blood basophils/100 leukocytes            0 %            NRG        

 

 Blood erythrocyte morphology finding identification            NORMAL         
    NRG        









 Whole blood basic metabolic panel - 10/19/17 05:00         









 Serum or plasma sodium measurement (moles/volume)            140 mmol/L       
     135-145        

 

 Serum or plasma potassium measurement (moles/volume)            3.8 mmol/L    
        3.6-5.0        

 

 Serum or plasma chloride measurement (moles/volume)            100 mmol/L     
               

 

 Carbon dioxide            29 mmol/L            21-32        

 

 Serum or plasma anion gap determination (moles/volume)            11 mmol/L   
         5-14        

 

 Serum or plasma urea nitrogen measurement (mass/volume)            22 mg/dL   
         7-18        

 

 Serum or plasma creatinine measurement (mass/volume)            0.75 mg/dL    
        0.60-1.30        

 

 Serum or plasma urea nitrogen/creatinine mass ratio            29             
NRG        

 

 Serum or plasma creatinine measurement with calculation of estimated 
glomerular filtration rate            >             NRG        

 

 Serum or plasma glucose measurement (mass/volume)            144 mg/dL        
            

 

 Serum or plasma calcium measurement (mass/volume)            8.9 mg/dL        
    8.5-10.1        









 Serum or plasma phosphate measurement (mass/volume) - 10/19/17 05:00         









 Serum or plasma phosphate measurement (mass/volume)            2.8 mg/dL      
      2.3-4.7        









 Magnesium - 10/19/17 05:00         









 Magnesium            2.4 mg/dL            1.8-2.4        









 Complete blood count (CBC) with automated white blood cell (WBC) differential 
- 10/20/17 05:50         









 Blood leukocytes automated count (number/volume)            13.5 10*3/uL      
      4.3-11.0        

 

 Blood erythrocytes automated count (number/volume)            4.71 10*6/uL    
        4.35-5.85        

 

 Venous blood hemoglobin measurement (mass/volume)            14.0 g/dL        
    11.5-16.0        

 

 Blood hematocrit (volume fraction)            43 %            35-52        

 

 Automated erythrocyte mean corpuscular volume            91 [foz_us]          
  80-99        

 

 Automated erythrocyte mean corpuscular hemoglobin (mass per erythrocyte)      
      30 pg            25-34        

 

 Automated erythrocyte mean corpuscular hemoglobin concentration measurement (
mass/volume)            33 g/dL            32-36        

 

 Automated erythrocyte distribution width ratio            13.7 %            
10.0-14.5        

 

 Automated blood platelet count (count/volume)            246 10*3/uL          
  130-400        

 

 Automated blood platelet mean volume measurement            9.1 [foz_us]      
      7.4-10.4        

 

 Automated blood neutrophils/100 leukocytes            76 %            42-75   
     

 

 Automated blood lymphocytes/100 leukocytes            14 %            12-44   
     

 

 Blood monocytes/100 leukocytes            9 %            0-12        

 

 Automated blood eosinophils/100 leukocytes            1 %            0-10     
   

 

 Automated blood basophils/100 leukocytes            0 %            0-10        

 

 Blood neutrophils automated count (number/volume)            10.3 10*3        
    1.8-7.8        

 

 Blood lymphocytes automated count (number/volume)            1.9 10*3         
   1.0-4.0        

 

 Blood monocytes automated count (number/volume)            1.2 10*3            
0.0-1.0        

 

 Automated eosinophil count            0.1 10*3/uL            0.0-0.3        

 

 Automated blood basophil count (count/volume)            0.0 10*3/uL          
  0.0-0.1        









 Whole blood basic metabolic panel - 10/20/17 05:50         









 Serum or plasma sodium measurement (moles/volume)            140 mmol/L       
     135-145        

 

 Serum or plasma potassium measurement (moles/volume)            3.4 mmol/L    
        3.6-5.0        

 

 Serum or plasma chloride measurement (moles/volume)            102 mmol/L     
               

 

 Carbon dioxide            30 mmol/L            21-32        

 

 Serum or plasma anion gap determination (moles/volume)            8 mmol/L    
        5-14        

 

 Serum or plasma urea nitrogen measurement (mass/volume)            21 mg/dL   
         7-18        

 

 Serum or plasma creatinine measurement (mass/volume)            0.77 mg/dL    
        0.60-1.30        

 

 Serum or plasma urea nitrogen/creatinine mass ratio            27             
NRG        

 

 Serum or plasma creatinine measurement with calculation of estimated 
glomerular filtration rate            >             NRG        

 

 Serum or plasma glucose measurement (mass/volume)            84 mg/dL         
           

 

 Serum or plasma calcium measurement (mass/volume)            8.6 mg/dL        
    8.5-10.1        









 Serum or plasma phosphate measurement (mass/volume) - 10/20/17 05:50         









 Serum or plasma phosphate measurement (mass/volume)            2.4 mg/dL      
      2.3-4.7        









 Magnesium - 10/20/17 05:50         









 Magnesium            2.4 mg/dL            1.8-2.4        









 Complete blood count (CBC) with automated white blood cell (WBC) differential 
- 18 15:04         









 Blood leukocytes automated count (number/volume)            7.0 10*3/uL       
     4.3-11.0        

 

 Blood erythrocytes automated count (number/volume)            4.61 10*6/uL    
        4.35-5.85        

 

 Venous blood hemoglobin measurement (mass/volume)            13.9 g/dL        
    11.5-16.0        

 

 Blood hematocrit (volume fraction)            43 %            35-52        

 

 Automated erythrocyte mean corpuscular volume            92 [foz_us]          
  80-99        

 

 Automated erythrocyte mean corpuscular hemoglobin (mass per erythrocyte)      
      30 pg            25-34        

 

 Automated erythrocyte mean corpuscular hemoglobin concentration measurement (
mass/volume)            33 g/dL            32-36        

 

 Automated erythrocyte distribution width ratio            14.1 %            
10.0-14.5        

 

 Automated blood platelet count (count/volume)            201 10*3/uL          
  130-400        

 

 Automated blood platelet mean volume measurement            9.0 [foz_us]      
      7.4-10.4        

 

 Automated blood neutrophils/100 leukocytes            78 %            42-75   
     

 

 Automated blood lymphocytes/100 leukocytes            13 %            12-44   
     

 

 Blood monocytes/100 leukocytes            9 %            0-12        

 

 Automated blood eosinophils/100 leukocytes            0 %            0-10     
   

 

 Automated blood basophils/100 leukocytes            0 %            0-10        

 

 Blood neutrophils automated count (number/volume)            5.5 10*3         
   1.8-7.8        

 

 Blood lymphocytes automated count (number/volume)            0.9 10*3         
   1.0-4.0        

 

 Blood monocytes automated count (number/volume)            0.6 10*3            
0.0-1.0        

 

 Automated eosinophil count            0.0 10*3/uL            0.0-0.3        

 

 Automated blood basophil count (count/volume)            0.0 10*3/uL          
  0.0-0.1        









 PT panel in platelet poor plasma by coagulation assay - 18 15:04         









 Prothrombin time (PT) in platelet poor plasma by coagulation assay            
12.1 s            12.2-14.7        

 

 INR in platelet poor plasma or blood by coagulation assay            0.9      
       0.8-1.4        









 Activated partial thromboplastin time (aPTT) in platelet poor plasma 
bycoagulation assay - 18 15:04         









 Activated partial thromboplastin time (aPTT) in platelet poor plasma 
bycoagulation assay            29 s            24-35        









 Blood lactic acid measurement (moles/volume) - 18 15:04         









 Blood lactic acid measurement (moles/volume)            2.03 mmol/L            
0.50-2.00        









 Comprehensive metabolic panel - 18 15:04         









 Serum or plasma sodium measurement (moles/volume)            134 mmol/L       
     135-145        

 

 Serum or plasma potassium measurement (moles/volume)            4.2 mmol/L    
        3.6-5.0        

 

 Serum or plasma chloride measurement (moles/volume)            98 mmol/L      
              

 

 Carbon dioxide            24 mmol/L            21-32        

 

 Serum or plasma anion gap determination (moles/volume)            12 mmol/L   
         5-14        

 

 Serum or plasma urea nitrogen measurement (mass/volume)            9 mg/dL    
        7-18        

 

 Serum or plasma creatinine measurement (mass/volume)            0.96 mg/dL    
        0.60-1.30        

 

 Serum or plasma urea nitrogen/creatinine mass ratio            9             
NRG        

 

 Serum or plasma creatinine measurement with calculation of estimated 
glomerular filtration rate            59             NRG        

 

 Serum or plasma glucose measurement (mass/volume)            85 mg/dL         
           

 

 Serum or plasma calcium measurement (mass/volume)            8.7 mg/dL        
    8.5-10.1        

 

 Serum or plasma total bilirubin measurement (mass/volume)            0.3 mg/dL
            0.1-1.0        

 

 Serum or plasma alkaline phosphatase measurement (enzymatic activity/volume)  
          86 U/L                    

 

 Serum or plasma aspartate aminotransferase measurement (enzymatic activity/
volume)            29 U/L            5-34        

 

 Serum or plasma alanine aminotransferase measurement (enzymatic activity/volume
)            16 U/L            0-55        

 

 Serum or plasma protein measurement (mass/volume)            6.8 g/dL         
   6.4-8.2        

 

 Serum or plasma albumin measurement (mass/volume)            4.1 g/dL         
   3.2-4.5        









 Magnesium - 18 15:04         









 Magnesium            1.9 mg/dL            1.8-2.4        









 Serum or plasma troponin i.cardiac measurement (mass/volume) - 18 15:04 
        









 Serum or plasma troponin i.cardiac measurement (mass/volume)            < ng/
mL            <0.30        









 Serum or plasma C reactive protein measurement (mass/volume) - 18 15:04 
        









 Serum or plasma C reactive protein measurement (mass/volume)            3.88 mg
/dL            0.00-0.50        









 Serum or plasma lithium measurement (moles/volume) - 18 15:04         









 BNP level            < pg/mL            <100.0        









 Arterial blood gas measurement - 18 15:06         









 Blood pCO2            52 mm[Hg]            35-45        

 

 Blood pO2            43 mm[Hg]            79-93        

 

 Arterial blood bicarbonate measurement (moles/volume)            28 mmol/L    
        23-27        

 

 Arterial blood base excess by calculation            3.3 mmol/L            -2.5
-2.5        

 

 Arterial blood oxygen saturation measurement            73 %            
        

 

 * Inhaled oxygen flow rate            3L             NRG        

 

 Arterial blood pH measurement with patient temperature correction            
7.36             7.37-7.43        

 

 Arterial blood carbon dioxide, total measurement (moles/volume)            
29.7 mmol/L            21.0-31.0        

 

 Body site            RT RAD             NRG        

 

 Assessment of wrist artery patency prior to arterial puncture            YES-
POS             NRG        

 

 Setting of ventilation mode            NO             NRG        

 

 Measurement of body temperature            100.0             NRG        









 Influenza virus A and B antigen detection - 18 15:08         









 FLU RESULT            NEGATIVE FOR INFLUENZA A AND B ANTIGENS BY IA           
  NRG        



                                                                               
                                                                               
  



Encounters

      





 ACCT No.            Visit Date/Time            Discharge            Status    
        Pt. Type            Provider            Facility            Loc./Unit  
          Complaint        

 

 [id number]            2018 09:38:00            2018 23:59:59    
        CLS            Outpatient            JOVON JOAQUIN DO            [facility]            PULM            COPD        

 

 [id number]            2017 14:58:00            2017 23:59:59    
        CLS            Outpatient            JOVON JOAQUIN DO            [facility]            RT            COPD        

 

 [id number]            10/13/2017 04:24:00            10/20/2017 12:38:00    
        DIS            Inpatient            NINA HENLEY MD            [facility]            4TH            RESPIRATORY DISTRESS,
COPD EXAC        

 

 [id number]            2017 16:55:00            2017 14:05:00    
        DIS            Inpatient            NINA HENLEY MD            [facility]            4TH            ACUTE COPD        

 

 [id number]            2016 19:06:00            2016 20:57:00    
        DIS            Emergency            DAYAN MAGAÑA DO            [facility]            ER            ANXIETY        

 

 [id number]            2016 12:12:00            2016 14:53:00    
        DIS            Outpatient            HERNANDEZ HERNANDEZ            [facility]            ER            BACK/LEFT WRIST PAIN RASH 
ON ABD        

 

 [id number]            11/10/2016 14:25:00            2016 13:43:00    
        DIS            Inpatient            NINA HENLEY MD            [facility]            4TH            ACUTE COPD EXACERBATION 
       

 

 [id number]            10/03/2016 11:06:00            10/03/2016 23:59:59    
        CLS            Outpatient            NINA HENLEY MD            [facility]            RAD            PRODUCTIVE COUGH, 
SPHENOID TENDERNESS        

 

 [id number]            2016 15:02:00            2016 23:59:59    
        CLS            Outpatient            NINA HENLEY MD            [facility]            RAD            COUGH        

 

 [id number]            2016 08:13:00            2016 23:59:59    
        CLS            Outpatient            NINA HENLEY MD            [facility]            RT            COPD        

 

 [id number]            2015 16:33:00            2015 18:43:00    
        DIS            Emergency            HERNANDEZ HERNANDEZ            [facility]            ER            ARMS AND LEG INJURY      
  

 

 [id number]            10/27/2015 11:25:00            10/27/2015 23:59:59    
        CLS            Outpatient            NINA HENLEY MD            [facility]            RAD            PNUEMONIA        

 

 [id number]            2015 08:43:00            2015 11:55:00    
        DIS            Emergency            SAMUEL DAMIAN DO            [facility]            ER            SOA        

 

 [id number]            2015 11:07:00            2015 23:59:59    
        CLS            Outpatient            NINA HENLEY MD            [facility]            RAD            THORAIC AND LUMBAR SPINE
  PAIN, OSTEOPORSIS        

 

 [id number]            2014 08:50:00            2014 23:59:59    
        CLS            Outpatient            NINA HENLEY MD            [facility]            RAD            SCREENING        

 

 [id number]            2014 08:22:00            2014 23:59:59    
        CLS            Outpatient            NINA HENLEY MD            [facility]            RAD            THORACIC PAIN        

 

 [id number]            2013 13:01:00            2013 23:59:59    
        CLS            Outpatient            MAJOR, CHONG ARNP            [facility] 
[facility]            QUICK            THROAT PAIN        

 

 [id number]            2013 11:27:00            2013 12:55:00    
        DIS            Emergency            MARYCRUZ JOSEPH MD            [facility]            ER            ALLERGIC REACTION        

 

 [id number]            2018 16:21:00                         ACT       
     Inpatient            NINA HENLEY MD            [facility]            ICU            COPD EXACERBATION ACUTE RESP DISTRESS, 
SEPSIS-        

 

 [id number]            2017 08:44:00                                   
   Document Registration                                                       
     

 

 [id number]            2017 08:44:00                                   
   Document Registration                                                       
     

 

 [id number]            2017 08:44:00                                   
   Document Registration                                                       
     

 

 [id number]            2017 08:44:00                                   
   Document Registration                                                       
     

 

 [id number]            10/11/2016 09:23:00                                   
   Document Registration                                                       
     

 

 [id number]            2015 11:19:00                                   
   Document Registration                                                       
     

 

 [id number]            2015 11:19:00                                   
   Document Registration                                                       
     

 

 [id number]            2015 11:19:00                                   
   Document Registration                                                       
     

 

 [id number]            2015 11:19:00                                   
   Document Registration                                                       
     

 

 [id number]            2015 11:19:00                                   
   Document Registration                                                       
     

 

 [id number]            2015 11:19:00                                   
   Document Registration                                                       
     

 

 [id number]            2015 09:43:00                                   
   Document Registration                                                       
     

 

 [id number]            2013 12:30:00                                   
   Document Registration                                                       
     

 

 [id number]            2012 07:31:00                                   
   Document Registration                                                       
     

 

 [id number]            2011 09:34:00                                   
   Document Registration                                                       
     

 

 [id number]            2011 05:02:00                                   
   Document Registration                                                       
     

 

 [id number]            2010 08:00:00                                   
   Document Registration                                                       
     

 

 [id number]            2010 11:00:00                                   
   Document Registration                                                       
     

 

 [id number]            2008 11:18:00                                   
   Document Registration

## 2018-02-07 NOTE — ED RESPIRATORY
General


Stated Complaint:  SOA


Source:  patient


Exam Limitations:  no limitations





History of Present Illness


Date Seen by Provider:  Feb 7, 2018


Time Seen by Provider:  14:53


Initial Comments


Patient presents to ER by EMS, [place] with a chief complaint that 2-3 days 

now she's had progressively worsening shortness of breath. She has a history of 

COPD. First responders remarked that she had a fever of 101F. She's had a 

cough but is been dry. She's been using her nebulized albuterol about 4 times a 

day as opposed to regular 2 times a day. She's been using her other inhalers 

appropriately and she is on prednisone 10 mg daily for the past several months. 

She had a flu shot this year and has not been around anybody with the flu that 

she is aware of. She's having some chills and body aches but no nausea vomiting 

diarrhea or rash. She is not on CPAP at home but she does use oxygen by 

concentrator 3-4 L per nasal cannula. She presents with EMS on 4 L nasal 

cannula. EMS remarks that her sats of been 90-91% and otherwise her vitals have 

been normal. Patient states she's not been on antibiotics for several months. 

EMS gave 12.5 mg albuterol on route and he felt that that helped her some.





Allergies and Home Medications


Allergies


Coded Allergies:  


     fluticasone (Unverified  Allergy, Mild, 12/3/09)


     salmeterol (Unverified  Allergy, Mild, 12/3/09)





Home Medications


Albuterol Sulfate 18 Gm Hfa.aer.ad, 2 PUFF IH Q4H PRN for SHORTNESS OF BREATH, (

Reported)


Albuterol Sulfate 2.5 Mg/0.5 Ml Vial.neb, 2.5 MG IH Q4H PRN for SHORTNESS OF 

BREATH, (Reported)


Amitriptyline HCl 25 Mg Tablet, 25 MG PO DAILY, (Reported)


Aspirin/Acetaminophen/Caffeine 1 Each Tablet, 1 TAB PO BID PRN for MIGRAINE, (

Reported)


Benzonatate 200 Mg Capsule, 200 MG PO Q8H PRN for COUGH, (Reported)


Citalopram Hydrobromide 10 Mg Tablet, 10 MG PO DAILY, (Reported)


Lorazepam 2 Mg Tablet, 1 MG PO Q6H PRN for ANXIETY, (Reported)


   TAKES 1/2 (2MG) TABLET 


Omeprazole 40 Mg Capsule.dr, 40 MG PO DAILY PRN for HEARTBURN, (Reported)


Prednisone 10 Mg Tab, 10 MG PO DAILY, (Reported)


Tiotropium Bromide 1 Inh Aerp, 1 CAP IH DAILY, (Reported)


Trazodone HCl 50 Mg Tablet, 50 MG PO HS, (Reported)





Constitutional:  chills, No diaphoresis, fever, malaise


EENTM:  No hearing loss, No ear pain


Respiratory:  cough, No phlegm, short of breath, wheezing


Cardiovascular:  No chest pain, No Hx of Intervention, No palpitations


Gastrointestinal:  No abdominal pain, No constipation, No diarrhea, No nausea


Genitourinary:  No discharge, No dysuria


Musculoskeletal:  No back pain, No joint pain


Skin:  No pruritus, No rash


Psychiatric/Neurological:  Denies Headache, Denies Numbness, Denies Paresthesia





Past Medical-Social-Family Hx


Patient Social History


Alcohol Use:  Denies Use


Recreational Drug Use:  No


Smoking Status:  Former Smoker


Type Used:  Cigarettes


Former Smoker, Quit:  Jan 11, 2016


2nd Hand Smoke Exposure:  Yes


Recent Hopitalizations:  Yes





Seasonal Allergies


Seasonal Allergies:  No





Surgeries


History of Surgeries:  Yes (TUBAL LIG)


Surgeries:  Tubal Ligation





Respiratory


History of Respiratory Disorde:  Yes (O2 DEPENDENT)


Respiratory Disorders:  COPD


Currently Using CPAP:  No


Currently Using BIPAP:  No





Cardiovascular


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





Neurological


History of Neurological Disord:  No





Reproductive System


Hx Reproductive Disorders:  No


Sexually Transmitted Disease:  No


Female Reproductive Disorders:  Denies


GYN History:  Tubal Ligation, Menopausal





Genitourinary


History of Genitourinary Disor:  No





Gastrointestinal


History of Gastrointestinal Di:  Yes


Gastrointestinal Disorders:  Gastroesophageal Reflux





Musculoskeletal


History of Musculoskeletal Dis:  Yes


Musculoskeletal Disorders:  Arthritis, Chronic Back Pain





Endocrine


History of Endocrine Disorders:  No





HEENT


History of HEENT Disorders:  Yes


HEENT Disorders:  Cataract


Loss of Vision:  Denies


Hearing Impairment:  Denies





Cancer


History of Cancer:  No





Psychosocial


History of Psychiatric Problem:  Yes


Behavioral Health Disorders:  Anxiety, Depression





Integumentary


History of Skin or Integumenta:  Yes (SHINGLES 12/2016)





Blood Transfusions


History of Blood Disorders:  No


Adverse Reaction to a Blood Tr:  No





Family Medical History


Family Medial History:  


Neoplasm


  19 FATHER


Respiratory disorder


  19 FATHER





Physical Exam


Vital Signs





Vital Signs - First Documented








 2/7/18 2/7/18





 15:00 15:16


 


Temp  100.0


 


Pulse  116


 


Resp  20


 


B/P (MAP)  135/89 (104)


 


Pulse Ox 96 


 


O2 Delivery Nasal Cannula 


 


O2 Flow Rate 3.00 





Capillary Refill :


General Appearance:  moderate distress, thin


Eyes:  Bilateral Eye Normal Inspection, Bilateral Eye PERRL, Bilateral Eye EOMI


HEENT:  PERRL/EOMI, normal ENT inspection, TMs normal, pharynx normal (oral 

mucosa dry)


Neck:  non-tender, full range of motion, supple, normal inspection


Respiratory:  chest non-tender, respiratory distress (moderate; increased work 

of breathing moderate to severe.), accessory muscle use, wheezing (audible 

across the room)


Cardiovascular:  normal peripheral pulses, regular rate, rhythm, no edema


Gastrointestinal:  normal bowel sounds, non tender, soft


Extremities:  normal inspection, no pedal edema, normal capillary refill


Neurologic/Psychiatric:  alert, oriented x 3, other (anxious)


Skin:  normal color, warm/dry





Focused Exam


Evaluation


Lactate Level


Laboratory Tests


2/7/18 15:04: Lactic Acid Level 2.03*H





Lactic Acid Level





Laboratory Tests








Test


  2/7/18


15:04


 


Lactic Acid Level


  2.03 MMOL/L


(0.50-2.00)  *H











Laceration Repair :  


   Suture Size:  5-0





Progress/Results/Core Measures


Suspected Sepsis


SIRS


Temperature: 


Pulse:  


Respiratory Rate: 


 


Laboratory Tests


2/7/18 15:04: White Blood Count 7.0


Blood Pressure  / 


Mean: 


 





Laboratory Tests


2/7/18 15:04: Lactic Acid Level 2.03*H


Laboratory Tests


2/7/18 15:04: 


Creatinine 0.96, INR Comment 0.9, Platelet Count 201, Total Bilirubin 0.3








Results/Orders


Lab Results





Laboratory Tests








Test


  2/7/18


15:04 2/7/18


15:06 Range/Units


 


 


White Blood Count


  7.0 


  


  4.3-11.0


10^3/uL


 


Red Blood Count


  4.61 


  


  4.35-5.85


10^6/uL


 


Hemoglobin 13.9   11.5-16.0  G/DL


 


Hematocrit 43   35-52  %


 


Mean Corpuscular Volume 92   80-99  FL


 


Mean Corpuscular Hemoglobin 30   25-34  PG


 


Mean Corpuscular Hemoglobin


Concent 33 


  


  32-36  G/DL


 


 


Red Cell Distribution Width 14.1   10.0-14.5  %


 


Platelet Count


  201 


  


  130-400


10^3/uL


 


Mean Platelet Volume 9.0   7.4-10.4  FL


 


Neutrophils (%) (Auto) 78 H  42-75  %


 


Lymphocytes (%) (Auto) 13   12-44  %


 


Monocytes (%) (Auto) 9   0-12  %


 


Eosinophils (%) (Auto) 0   0-10  %


 


Basophils (%) (Auto) 0   0-10  %


 


Neutrophils # (Auto) 5.5   1.8-7.8  X 10^3


 


Lymphocytes # (Auto) 0.9 L  1.0-4.0  X 10^3


 


Monocytes # (Auto) 0.6   0.0-1.0  X 10^3


 


Eosinophils # (Auto)


  0.0 


  


  0.0-0.3


10^3/uL


 


Basophils # (Auto)


  0.0 


  


  0.0-0.1


10^3/uL


 


Prothrombin Time 12.1 L  12.2-14.7  SEC


 


INR Comment 0.9   0.8-1.4  


 


Activated Partial


Thromboplast Time 29 


  


  24-35  SEC


 


 


Sodium Level 134 L  135-145  MMOL/L


 


Potassium Level 4.2   3.6-5.0  MMOL/L


 


Chloride Level 98     MMOL/L


 


Carbon Dioxide Level 24   21-32  MMOL/L


 


Anion Gap 12   5-14  MMOL/L


 


Blood Urea Nitrogen 9   7-18  MG/DL


 


Creatinine


  0.96 


  


  0.60-1.30


MG/DL


 


Estimat Glomerular Filtration


Rate 59 


  


   


 


 


BUN/Creatinine Ratio 9    


 


Glucose Level 85     MG/DL


 


Lactic Acid Level


  2.03 *H


  


  0.50-2.00


MMOL/L


 


Calcium Level 8.7   8.5-10.1  MG/DL


 


Magnesium Level 1.9   1.8-2.4  MG/DL


 


Total Bilirubin 0.3   0.1-1.0  MG/DL


 


Aspartate Amino Transf


(AST/SGOT) 29 


  


  5-34  U/L


 


 


Alanine Aminotransferase


(ALT/SGPT) 16 


  


  0-55  U/L


 


 


Alkaline Phosphatase 86     U/L


 


Troponin I < 0.30   <0.30  NG/ML


 


C-Reactive Protein High


Sensitivity 3.88 H


  


  0.00-0.50


MG/DL


 


B-Type Natriuretic Peptide < 10.0   <100.0  PG/ML


 


Total Protein 6.8   6.4-8.2  GM/DL


 


Albumin 4.1   3.2-4.5  GM/DL


 


Blood Gas Puncture Site  RT RAD   


 


Blood Gas Patient Temperature  100.0   


 


Arterial Blood pH  7.36 L 7.37-7.43  


 


Arterial Blood Partial


Pressure CO2 


  52 H


  35-45  MMHG


 


 


Arterial Blood Partial


Pressure O2 


  43 L


  79-93  MMHG


 


 


Arterial Blood HCO3  28 H 23-27  MMOL/L


 


Arterial Blood Total CO2


  


  29.7 


  21.0-31.0


MMOL/L


 


Arterial Blood Oxygen


Saturation 


  73 L


    %


 


 


Arterial Blood Base Excess


  


  3.3 H


  -2.5-2.5


MMOL/L


 


Hermes Test  YES-POS   


 


Blood Gas Ventilator Setting  NO   


 


Blood Gas Inspired Oxygen  3L   








Micro Results





Microbiology


2/7/18 Influenza Types A,B Antigen ([facility]) - Final, Complete


         





My Orders





Orders - NIALL GUERRERO


Arterial Blood Gas (2/7/18 14:49)


BNP (2/7/18 14:49)


Cbc With Automated Diff (2/7/18 14:49)


Comprehensive Metabolic Panel (2/7/18 14:49)


Hs C Reactive Protein (2/7/18 14:49)


Magnesium (2/7/18 14:49)


Troponin I (2/7/18 14:49)


Influenza A And B Antigens (2/7/18 14:49)


Chest 1 View, Ap/Pa Only (2/7/18 14:49)


Albuterol  Pre-Mix Nebs (Rt) (Proventil (2/7/18 14:49)


Albuterol/Ipra Inhalation Soln (Duoneb I (2/7/18 15:00)


Rt Request For Service (2/7/18 14:49)


Saline Lock/Iv-Start (2/7/18 14:49)


Lactated Ringers (Lr 1000 Ml Iv Solution (2/7/18 14:49)


Svn Sm Volume Nebulizer Rt-Rfs (2/7/18 14:49)


Lactic Acid Analyzer (2/7/18 14:55)


Blood Culture (2/7/18 14:55)


Sputum Culture (2/7/18 14:55)


Ua Culture If Indicated (2/7/18 14:55)


Protime With Inr (2/7/18 14:55)


Partial Thromboplastin Time (2/7/18 14:55)


O2 (2/7/18 14:55)


Acetaminophen  Tablet (Tylenol  Tablet) (2/7/18 15:00)


Saline Lock/Iv-Start (2/7/18 14:55)


Vital Signs Adult Sepsis Patie Q1H (2/7/18 14:55)


Ceftriaxone Injection (Rocephin Injectio (2/7/18 14:55)


Remove Rings In Anticipation O (2/7/18 14:55)


Albuterol  Pre-Mix Nebs (Rt) (Proventil (2/7/18 14:54)


Albuterol/Ipra Inhalation Soln (Duoneb I (2/7/18 14:54)


Methylprednisolone Sod Succ (Solu-Medrol (2/7/18 15:15)


Ns Iv 500 Ml (Sodium Chloride 0.9%) (2/7/18 15:55)





Medications Given in ED





Current Medications








 Medications  Dose


 Ordered  Sig/Arlet


 Route  Start Time


 Stop Time Status Last Admin


Dose Admin


 


 Acetaminophen  1,000 mg  ONCE  PRN


 PO  2/7/18 15:00


 2/7/18 15:41 DC 2/7/18 15:41


1,000 MG


 


 Albuterol/


 Ipratropium  3 ml  STK-MED ONCE


 .ROUTE  2/7/18 14:54


 2/7/18 14:56 DC 2/7/18 15:17


3 ML


 


 Lactated Ringer's  1,000 ml @ 


 0 mls/hr  Q0M ONCE


 IV  2/7/18 14:49


 2/7/18 14:56 DC 2/7/18 15:40


0 MLS/HR


 


 Methylprednisolone


 Sodium Succinate  125 mg  ONCE  ONCE


 IVP  2/7/18 15:15


 2/7/18 15:16 DC 2/7/18 15:41


125 MG








Vital Signs/I&O





Vital Sign - Last 12Hours








 2/7/18 2/7/18 2/7/18





 15:00 15:16 15:18


 


Temp  100.0 


 


Pulse  116 118


 


Resp  20 36


 


B/P (MAP)  135/89 (104) 


 


Pulse Ox 96 96 100


 


O2 Delivery Nasal Cannula Nasal Cannula 


 


O2 Flow Rate 3.00 3.00 30.00





Capillary Refill :


Progress Note #1:  


   Time:  15:01


Progress Note


COPD exacerbation secondary to influenza or pneumonia possibly. Her going to 

initiate CPAP at 10 cm of water pressure as well as an hour-long albuterol 

DuoNeb treatment. We'll get an ABG. At this point the patient is compensating 

and keeping her sats up or increased work of breathing.


Progress Note #2:  


   Time:  15:43


Progress Note


Patient is still having some wheezing but breath sounds are much better and she 

is much more relaxed on BiPAP. She still receiving her hour-long albuterol 

treatment. She says she feels wore out but she feels like her breathing has 

improved.





ECG


Initial ECG Impression Date:  Feb 7, 2018





Diagnostic Imaging





   Diagonstic Imaging:  Xray


   Plain Films/CT/US/NM/MRI:  chest (1v)


   Reviewed:  Reviewed by Me





Departure


Communication (Admissions)


Time/Spoke to Admitting Phy:  15:53


Communication


Spoke with Dr. Henley discussed case lab imaging findings and he would prefer 

cefepime and vancomycin. He would like to consult pulmonology inpatient. He 

will see the patient later.


Time/Spoke to Consulting Phy:  16:08


Communication/Consulting


Dr. Jackman, pulmonology discussed case lab imaging and findings and he will see 

the patient.





Impression


Impression:  


 Primary Impression:  


 COPD with acute exacerbation


 Additional Impressions:  


 Acute respiratory distress


 Hypercapnia


 Hypoxemia


Disposition:  09 ADMITTED AS INPATIENT


Condition:  Stable





Admissions


Decision to Admit Reason:  Admit from ER (General)


Decision to Admit/Date:  Feb 7, 2018


Time/Decision to Admit Time:  15:47





Departure-Patient Inst.


Referrals:  


NINA HENLEY MD (PCP/Family)


Primary Care Physician





Copy


Copies To 1:   NINA HENLEY MD, TITUS J Feb 7, 2018 15:02

## 2018-02-07 NOTE — DIAGNOSTIC IMAGING REPORT
EXAMINATION: Portable erect AP chest at 3:14 p.m.



INDICATION: Shortness of breath.



FINDINGS: The heart size is within normal limits and stable when

compared to 10/20/2017. The chronic pulmonary changes evident on

the prior study are again visualized and do not appear to have

changed significantly. There is still no sign of failure,

pneumonia, or pleural effusion to indicate an acute abnormality.

The mediastinum is not widened. The osseous structures are

intact.



IMPRESSION: There is chronic pulmonary disease, but there is no

evidence for an acute cardiopulmonary abnormality.



Dictated by: 



  Dictated on workstation # [id number]

## 2018-02-07 NOTE — HISTORY & PHYSICIAL
History of Present Illness


History of Present Illness


Reason for visit/HPI


62-year-old female presents to via [facility] emergency after having increasing 

shortness of breath  She is with known steroid-dependent COPD aking 10 mg of 

prednisone daily.  EMS reports that she did have a temperature of 101.  She 

does also admit to a dry cough.  She has available at home albuterol at home 

and typically uses this up to 4 times daily.  She was on 4 L by nasal cannula 

when EMS arrived with her at the emergency department.  She has not recently 

been on any antibiotics.  She also does admit to taking the flu shot.


Date of Admission


2018 at 16:21


Date Seen by Provider:  2018


Time Seen by Provider:  18:00


I consulted on this patient on


18


 18:17


Attending Physician


Diomedes Henley MD


Admitting Physician


Diomedes Henley MD


Consult








Allergies and Home Medications


Allergies


Coded Allergies:  


     fluticasone (Unverified  Allergy, Mild, 12/3/09)


     salmeterol (Unverified  Allergy, Mild, 12/3/09)





Home Medications


Albuterol Sulfate 18 Gm Hfa.aer.ad, 2 PUFF IH Q4H PRN for SHORTNESS OF BREATH, (

Reported)


Albuterol Sulfate 2.5 Mg/0.5 Ml Vial.neb, 2.5 MG IH Q4H PRN for SHORTNESS OF 

BREATH, (Reported)


Amitriptyline HCl 25 Mg Tablet, 25 MG PO DAILY, (Reported)


Aspirin/Acetaminophen/Caffeine 1 Each Tablet, 1 TAB PO BID PRN for MIGRAINE, (

Reported)


Benzonatate 200 Mg Capsule, 200 MG PO Q8H PRN for COUGH, (Reported)


Citalopram Hydrobromide 10 Mg Tablet, 10 MG PO DAILY, (Reported)


Lorazepam 2 Mg Tablet, 1 MG PO Q6H PRN for ANXIETY, (Reported)


   TAKES 1/2 (2MG) TABLET 


Omeprazole 40 Mg Capsule.dr, 40 MG PO DAILY PRN for HEARTBURN, (Reported)


Prednisone 10 Mg Tab, 10 MG PO DAILY, (Reported)


Tiotropium Bromide 1 Inh Aerp, 1 CAP IH DAILY, (Reported)


Trazodone HCl 50 Mg Tablet, 50 MG PO HS, (Reported)





Past Medical-Social-Family Hx


Patient Social History


Marrital Status:  cohabiting


Alcohol Use:  Denies Use


Recreational Drug Use:  No


Smoking Status:  Current Everyday Smoker


Former Smoker, Quit:  2016


Type Used:  Cigarettes


2nd Hand Smoke Exposure:  Yes


Recent Foreign Travel:  No


Contact w/other who traveled:  No


Recent Hopitalizations:  Yes


Recent Infectious Disease Expo:  No





Seasonal Allergies


Seasonal Allergies:  No





Surgeries


Yes (TUBAL LIG)


Tubal Ligation





Respiratory


Yes (O2 DEPENDENT)


Currently Using CPAP:  No


Currently Using BIPAP:  No





Cardiovascular


Yes (PIN HOLE IN HEART)





Neurological


No





Reproductive System


Hx Reproductive Disorders:  No


Sexually Transmitted Disease:  No


Female Reproductive Disorders:  Denies


GYN History:  Tubal Ligation, Menopausal





Genitourinary


No





Gastrointestinal


Yes


Gastroesophageal Reflux





Musculoskeletal


Yes


Arthritis, Chronic Back Pain





Endocrine


History of Endocrine Disorders:  No





HEENT


History of HEENT Disorders:  Yes


HEENT Disorders:  Cataract


Loss of Vision:  Denies


Hearing Impairment:  Denies





Cancer


No





Psychosocial


History of Psychiatric Problem:  Yes


Behavioral Health Disorders:  Anxiety, Depression





Integumentary


History of Skin or Integumenta:  Yes (SHINGLES 2016)





Blood Transfusions


History of Blood Disorders:  No


Adverse Reaction to a Blood Tr:  No





Family Medical History


Family Hx:  


Neoplasm


  19 FATHER


Respiratory disorder


  19 FATHER





Constitutional:  see HPI





Physical Exam


Vital Signs





Vital Signs - First Documented








 18





 15:00 15:16


 


Temp  100.0


 


Pulse  116


 


Resp  20


 


B/P (MAP)  135/89 (104)


 


Pulse Ox 96 


 


O2 Delivery Nasal Cannula 


 


O2 Flow Rate 3.00 





Capillary Refill : Less Than 3 Seconds


General Appearance:  Mild Distress (currently in ICU with BiPAP)


HEENT:  Moist Mucous Membranes


Neck:  Full Range of Motion, Supple


Respiratory:  Accessory Muscle Use, Respiratory Distress (is mild), Other (

distant)


Cardiovascular:  Regular Rate, Rhythm


Gastrointestinal:  Soft


Rectal:  Deferred


Back:  Normal Inspection


Comments


NAME:   ESTEFANÍA NATHAN


[facility] REC#:   [id number]


ACCOUNT#:   [id number]


PT STATUS:   REG ER


:   1955


PHYSICIAN:   NIALL GUERRERO MD


ADMIT DATE:   18/ER


 ***Draft***


Date of Exam:18





CHEST 1 VIEW, AP/PA ONLY








EXAMINATION: Portable erect AP chest at 3:14 p.m.





INDICATION: Shortness of breath.





FINDINGS: The heart size is within normal limits and stable when


compared to 10/20/2017. The chronic pulmonary changes evident on


the prior study are again visualized and do not appear to have


changed significantly. There is still no sign of failure,


pneumonia, or pleural effusion to indicate an acute abnormality.


The mediastinum is not widened. The osseous structures are


intact.





IMPRESSION: There is chronic pulmonary disease, but there is no


evidence for an acute cardiopulmonary abnormality.





  Dictated on workstation # [id number]








Dict:   [id number]


Trans:   18 1603


 [id number]





Interpreted by:     GENNY ETIENNE MD


Electronically signed by:





Assessment/Plan


Assessment and Plan


1. COPD exacerbation


-patient admitted to intensive care  unit


-She is currently on BiPAP


-Pulmonary consultation





2.  Acute respiratory distress secondary to #1





3.  Hypoxemia


-support with oxygen currently on BiPAP


-wean to nasal cannula as she improves





4.  Bronchitis vs early pneumonia


-she will be started on Rocephin 1 g daily as well as Zithromax


Problems:  





Admission Diagnosis


1. COPD exacerbation


2.  Acute respiratory distress secondary to #1


3.  Hypoxemia


4.  Bronchitis suspected











DIOMEDES HENLEY MD 2018 18:22

## 2018-02-07 NOTE — XMS REPORT
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                     

 

 2017            HERNANDEZ HERNANDEZ            Ot            J44.9      
      CHRONIC OBSTRUCTIVE PULMONARY DISEASE, U                     

 

 2017            HERNANDEZ HERNANDEZ APRN            Ot            K59.00     
       CONSTIPATION, UNSPECIFIED                     

 

 2017            HERNANDEZ HERNANDEZ APRN            Ot            M51.37     
       OTHER INTERVERTEBRAL DISC DEGENERATION,                      

 

 2017            HERNANDEZ HERNANDEZ APRN            Ot            M54.5      
      LOW BACK PAIN                     

 

 2017            HERNANDEZ HERNANDEZ APRN            Ot            R91.1      
      SOLITARY PULMONARY NODULE                     

 

 2017            HERNANDEZ HERNANDEZ APRN            Ot            Z79.899    
        OTHER LONG TERM (CURRENT) DRUG THERAPY                     

 

 04/10/2017            NINA HENLEY MD            Ot            J18.9       
     PNEUMONIA, UNSPECIFIED ORGANISM                     

 

 2017            NINA HENLEY MD, Ot            J18.9       
     PNEUMONIA, UNSPECIFIED ORGANISM                     

 

 2017            NINA HENLEY MD, Ot            J18.9       
     PNEUMONIA, UNSPECIFIED ORGANISM                     

 

 2017            NINA HENLEY MD, Ot            J18.9       
     PNEUMONIA, UNSPECIFIED ORGANISM                     

 

 2017            NINA HENLEY MD, Ot            J18.9       
     PNEUMONIA, UNSPECIFIED ORGANISM                     

 

 10/16/2017            NINA HENLEY MD            Ot            F32.9       
     MAJOR DEPRESSIVE DISORDER, SINGLE EPISOD                     

 

 10/16/2017            NINA HENLEY MD, Ot            F41.9       
     ANXIETY DISORDER, UNSPECIFIED                     

 

 10/16/2017            NINA HENLEY MD, Ot            H26.9       
     UNSPECIFIED CATARACT                     

 

 10/16/2017            NINA HENLEY MD, Ot            J44.1       
     CHRONIC OBSTRUCTIVE PULMONARY DISEASE W                      

 

 10/16/2017            NINA HENLEY MD, Ot            J96.20      
      ACUTE AND CHR RESP FAILURE, UNSP W HYPOX                     

 

 10/16/2017            NINA HENLEY MD, Ot            K21.9       
     GASTRO-ESOPHAGEAL REFLUX DISEASE WITHOUT                     

 

 10/16/2017            NINA HENLEY MD, Ot            M19.91      
      PRIMARY OSTEOARTHRITIS, UNSPECIFIED SITE                     

 

 10/16/2017            NINA HENLEY MD, Ot            M54.9       
     DORSALGIA, UNSPECIFIED                     

 

 10/16/2017            NINA HENLEY MD, Ot            R06.03      
      ACUTE RESPIRATORY DISTRESS                     

 

 10/16/2017            NINA HENLEY MD, Ot            Z77.22      
      CNTCT W AND EXPSR TO ENVIRON TOBACCO SMO                     

 

 10/16/2017            NINA HENLEY MD, Ot            Z79.52      
      LONG TERM (CURRENT) USE OF SYSTEMIC STER                     

 

 10/16/2017            NINA HENLEY MD, Ot            Z87.891     
       PERSONAL HISTORY OF NICOTINE DEPENDENCE                     

 

 10/16/2017            NINA HENLEY MD, Ot            Z99.81      
      DEPENDENCE ON SUPPLEMENTAL OXYGEN                     

 

 10/17/2017            NINA HENLEY MD, Ot            F32.9       
     MAJOR DEPRESSIVE DISORDER, SINGLE EPISOD                     

 

 10/17/2017            NINA HENLEY MD, Ot            F41.9       
     ANXIETY DISORDER, UNSPECIFIED                     

 

 10/17/2017            NINA HENLEY MD, Ot            H26.9       
     UNSPECIFIED CATARACT                     

 

 10/17/2017            NINA HENLEY MD, Ot            J44.1       
     CHRONIC OBSTRUCTIVE PULMONARY DISEASE W                      

 

 10/17/2017            NINA HENLEY MD, Ot            J96.20      
      ACUTE AND CHR RESP FAILURE, UNSP W HYPOX                     

 

 10/17/2017            NINA HENLEY MD, Ot            K21.9       
     GASTRO-ESOPHAGEAL REFLUX DISEASE WITHOUT                     

 

 10/17/2017            NINA HENLEY MD, Ot            M19.91      
      PRIMARY OSTEOARTHRITIS, UNSPECIFIED SITE                     

 

 10/17/2017            NINA HENLEY MD, Ot            M54.9       
     DORSALGIA, UNSPECIFIED                     

 

 10/17/2017            NINA HENLEY MD, Ot            R06.03      
      ACUTE RESPIRATORY DISTRESS                     

 

 10/17/2017            NINA HENLEY MD, Ot            Z77.22      
      CNTCT W AND EXPSR TO ENVIRON TOBACCO SMO                     

 

 10/17/2017            NINA HENLEY MD, Ot            Z79.52      
      LONG TERM (CURRENT) USE OF SYSTEMIC STER                     

 

 10/17/2017            NINA HENLEY MD, Ot            Z87.891     
       PERSONAL HISTORY OF NICOTINE DEPENDENCE                     

 

 10/17/2017            NINA HENLEY MD            Ot            Z99.81      
      DEPENDENCE ON SUPPLEMENTAL OXYGEN                     

 

 10/18/2017            NINA HENLEY MD, Ot            F32.9       
     MAJOR DEPRESSIVE DISORDER, SINGLE EPISOD                     

 

 10/18/2017            NINA HENLEY MD, Ot            F41.9       
     ANXIETY DISORDER, UNSPECIFIED                     

 

 10/18/2017            NINA HENLEY MD, Ot            H26.9       
     UNSPECIFIED CATARACT                     

 

 10/18/2017            NINA HENLEY MD, Ot            J44.1       
     CHRONIC OBSTRUCTIVE PULMONARY DISEASE W                      

 

 10/18/2017            NINA HENLEY MD, Ot            J96.20      
      ACUTE AND CHR RESP FAILURE, UNSP W HYPOX                     

 

 10/18/2017            NINA HENLEY MD, Ot            K21.9       
     GASTRO-ESOPHAGEAL REFLUX DISEASE WITHOUT                     

 

 10/18/2017            NINA HENLEY MD, Ot            M19.91      
      PRIMARY OSTEOARTHRITIS, UNSPECIFIED SITE                     

 

 10/18/2017            NINA HENLEY MD, Ot            M54.9       
     DORSALGIA, UNSPECIFIED                     

 

 10/18/2017            NINA HENLEY MD, Ot            R06.03      
      ACUTE RESPIRATORY DISTRESS                     

 

 10/18/2017            NINA HENLEY MD, Ot            Z77.22      
      CNTCT W AND EXPSR TO ENVIRON TOBACCO SMO                     

 

 10/18/2017            NINA HENLEY MD, Ot            Z79.52      
      LONG TERM (CURRENT) USE OF SYSTEMIC STER                     

 

 10/18/2017            NINA HENLEY MD, Ot            Z87.891     
       PERSONAL HISTORY OF NICOTINE DEPENDENCE                     

 

 10/18/2017            NINA HENLEY MD, Ot            Z99.81      
      DEPENDENCE ON SUPPLEMENTAL OXYGEN                     

 

 10/19/2017            NINA HENLEY MD, Ot            F32.9       
     MAJOR DEPRESSIVE DISORDER, SINGLE EPISOD                     

 

 10/19/2017            NINA HENLEY MD, Ot            F41.9       
     ANXIETY DISORDER, UNSPECIFIED                     

 

 10/19/2017            NINA HENLEY MD            Ot            H26.9       
     UNSPECIFIED CATARACT                     

 

 10/19/2017            NINA HENLEY MD, Ot            J44.1       
     CHRONIC OBSTRUCTIVE PULMONARY DISEASE W                      

 

 10/19/2017            NINA HENLEY MD, Ot            J96.20      
      ACUTE AND CHR RESP FAILURE, UNSP W HYPOX                     

 

 10/19/2017            NINA HENLEY MD, Ot            K21.9       
     GASTRO-ESOPHAGEAL REFLUX DISEASE WITHOUT                     

 

 10/19/2017            NINA HENLEY MD, Ot            M19.91      
      PRIMARY OSTEOARTHRITIS, UNSPECIFIED SITE                     

 

 10/19/2017            NINA HENLEY MD, Ot            M54.9       
     DORSALGIA, UNSPECIFIED                     

 

 10/19/2017            NINA HENLEY MD            Ot            R06.03      
      ACUTE RESPIRATORY DISTRESS                     

 

 10/19/2017            NINA HENLEY MD, Ot            Z77.22      
      CNTCT W AND EXPSR TO ENVIRON TOBACCO SMO                     

 

 10/19/2017            NINA HENLEY MD, Ot            Z79.52      
      LONG TERM (CURRENT) USE OF SYSTEMIC STER                     

 

 10/19/2017            NINA HENLEY MD, Ot            Z87.891     
       PERSONAL HISTORY OF NICOTINE DEPENDENCE                     

 

 10/19/2017            NINA HENLEY MD, Ot            Z99.81      
      DEPENDENCE ON SUPPLEMENTAL OXYGEN                     

 

 10/19/2017            NINA HENLEY MD, Ot            F32.9       
     MAJOR DEPRESSIVE DISORDER, SINGLE EPISOD                     

 

 10/19/2017            NINA HENLEY MD, Ot            F41.9       
     ANXIETY DISORDER, UNSPECIFIED                     

 

 10/19/2017            NINA HENLEY MD, Ot            H26.9       
     UNSPECIFIED CATARACT                     

 

 10/19/2017            NINA HENLEY MD, Ot            J44.1       
     CHRONIC OBSTRUCTIVE PULMONARY DISEASE W                      

 

 10/19/2017            NINA HENLEY MD, Ot            J96.20      
      ACUTE AND CHR RESP FAILURE, UNSP W HYPOX                     

 

 10/19/2017            NINA HENLEY MD, Ot            K21.9       
     GASTRO-ESOPHAGEAL REFLUX DISEASE WITHOUT                     

 

 10/19/2017            NINA HENLEY MD, Ot            M19.91      
      PRIMARY OSTEOARTHRITIS, UNSPECIFIED SITE                     

 

 10/19/2017            NINA HENLEY MD, Ot            M54.9       
     DORSALGIA, UNSPECIFIED                     

 

 10/19/2017            NINA HENLEY MD, Ot            R06.03      
      ACUTE RESPIRATORY DISTRESS                     

 

 10/19/2017            NINA HENLEY MD, Ot            Z77.22      
      CNTCT W AND EXPSR TO ENVIRON TOBACCO SMO                     

 

 10/19/2017            NINA HENLEY MD, Ot            Z79.52      
      LONG TERM (CURRENT) USE OF SYSTEMIC STER                     

 

 10/19/2017            NINA HENLEY MD, Ot            Z87.891     
       PERSONAL HISTORY OF NICOTINE DEPENDENCE                     

 

 10/19/2017            NINA HENLEY MD, Ot            Z99.81      
      DEPENDENCE ON SUPPLEMENTAL OXYGEN                     

 

 10/20/2017            NINA HENLEY MD, Ot            F32.9       
     MAJOR DEPRESSIVE DISORDER, SINGLE EPISOD                     

 

 10/20/2017            NINA HENLEY MD, Ot            F41.9       
     ANXIETY DISORDER, UNSPECIFIED                     

 

 10/20/2017            NINA HENLEY MD, Ot            H26.9       
     UNSPECIFIED CATARACT                     

 

 10/20/2017            NINA HENLEY MD, Ot            J44.1       
     CHRONIC OBSTRUCTIVE PULMONARY DISEASE W                      

 

 10/20/2017            NINA HENLEY MD, Ot            J96.20      
      ACUTE AND CHR RESP FAILURE, UNSP W HYPOX                     

 

 10/20/2017            NINA HENLEY MD, Ot            K21.9       
     GASTRO-ESOPHAGEAL REFLUX DISEASE WITHOUT                     

 

 10/20/2017            NINA HENLEY MD, Ot            M19.91      
      PRIMARY OSTEOARTHRITIS, UNSPECIFIED SITE                     

 

 10/20/2017            NINA HENLEY MD, Ot            M54.9       
     DORSALGIA, UNSPECIFIED                     

 

 10/20/2017            NINA HENLEY MD, Ot            R06.03      
      ACUTE RESPIRATORY DISTRESS                     

 

 10/20/2017            NINA HENLEY MD, Ot            Z77.22      
      CNTCT W AND EXPSR TO ENVIRON TOBACCO SMO                     

 

 10/20/2017            NINA HENLEY MD, Ot            Z79.52      
      LONG TERM (CURRENT) USE OF SYSTEMIC STER                     

 

 10/20/2017            NINA HENLEY MD, Ot            Z87.891     
       PERSONAL HISTORY OF NICOTINE DEPENDENCE                     

 

 10/20/2017            NINA HENLEY MD, Ot            Z99.81      
      DEPENDENCE ON SUPPLEMENTAL OXYGEN                     

 

 10/20/2017            NINA HENLEY MD, Ot            F32.9       
     MAJOR DEPRESSIVE DISORDER, SINGLE EPISOD                     

 

 10/20/2017            NINA HENLEY MD, Ot            F41.0       
     PANIC DISORDER [EPISODIC PAROXYSMAL ANXI                     

 

 10/20/2017            NINA HENLEY MD, Ot            H26.9       
     UNSPECIFIED CATARACT                     

 

 10/20/2017            NINA HENLEY MD, Ot            J44.1       
     CHRONIC OBSTRUCTIVE PULMONARY DISEASE W                      

 

 10/20/2017            NINA HENLEY MD, Ot            J96.20      
      ACUTE AND CHR RESP FAILURE, UNSP W HYPOX                     

 

 10/20/2017            NINA HENLEY MD, Ot            K21.9       
     GASTRO-ESOPHAGEAL REFLUX DISEASE WITHOUT                     

 

 10/20/2017            NINA HENLEY MD, Ot            M19.91      
      PRIMARY OSTEOARTHRITIS, UNSPECIFIED SITE                     

 

 10/20/2017            NINA HENLEY MD, Ot            M54.9       
     DORSALGIA, UNSPECIFIED                     

 

 10/20/2017            NINA HENLEY MD            Ot            R06.03      
      ACUTE RESPIRATORY DISTRESS                     

 

 10/20/2017            NINA HENLEY MD, Ot            Z23         
   ENCOUNTER FOR IMMUNIZATION                     

 

 10/20/2017            NINA HENLEY MD, Ot            Z77.22      
      CNTCT W AND EXPSR TO ENVIRON TOBACCO SMO                     

 

 10/20/2017            NINA HENLEY MD, Ot            Z79.52      
      LONG TERM (CURRENT) USE OF SYSTEMIC STER                     

 

 10/20/2017            NINA HENLEY MD, Ot            Z87.891     
       PERSONAL HISTORY OF NICOTINE DEPENDENCE                     

 

 10/20/2017            NINA HENLEY MD, Ot            Z99.81      
      DEPENDENCE ON SUPPLEMENTAL OXYGEN                     

 

 2017            NINA HENLEY MD, Ot            J18.9       
     PNEUMONIA, UNSPECIFIED ORGANISM                     

 

 2017            NINA HENLEY MD, Ot            J18.9       
     PNEUMONIA, UNSPECIFIED ORGANISM                     

 

 2017                         Ot            V76.12                       
           

 

 2017                         Ot            787.20            DYSPHAGIA, 
UNSPECIFIED                     

 

 2017            NINA HENLEY MD, Ot            721.8       
     SPINAL DISORDERS NEC                     

 

 2017            NINA HENLEY MD            Ot            724.1       
     PAIN IN THORACIC SPINE                     

 

 2017            NINA HENLEY MD            Ot            733.00      
      OSTEOPOROSIS NOS                     

 

 2017            NINA HENLEY MD            Ot            V49.81      
      ASYMPT POSTMENOPAUSAL STATUS (AGE-RELATE                     

 

 2017            NINA HENLEY MD            Ot            V82.89      
      SCREEN FOR OTH SPECIF CONDITIONS                     

 

 2017            NINA HENLEY MD, Ot            721.3       
     LUMBOSACRAL SPONDYLOSIS                     

 

 2017            NINA HENLEY MD, Ot            724.1       
     PAIN IN THORACIC SPINE                     

 

 2017            NINA HENLEY MD            Ot            733.00      
      OSTEOPOROSIS NOS                     

 

 2017            NINA HENLEY MD            Ot            737.30      
      IDIOPATHIC SCOLIOSIS                     

 

 2017            NINA HENLEY MD, Ot            J18.9       
     PNEUMONIA, UNSPECIFIED ORGANISM                     

 

 2017            NINA HENLEY MD, Ot            J44.9       
     CHRONIC OBSTRUCTIVE PULMONARY DISEASE, U                     

 

 2017            LORY SCOTT NINA JACOBSON            Ot            R05         
   COUGH                     

 

 2017            LORY SCOTT, NINA JACOBSON            Ot            R91.8       
     OTHER NONSPECIFIC ABNORMAL FINDING OF ELYSE                     

 

 2017                         Ot            Z12.31            ENCNTR 
SCREEN MAMMOGRAM FOR MALIGNANT NE                     

 

 2017            LORY SCOTT, NINA JACOBSON            Ot            R05         
   COUGH                     

 

 2017            LORY SCOTT, NINA JACOBSON            Ot            R51         
   HEADACHE                     

 

 2017            NINA HENLEY MD            Ot            J18.9       
     PNEUMONIA, UNSPECIFIED ORGANISM                     

 

 2017            JOVON JOAQUIN DO            Ot            F41.9       
     ANXIETY DISORDER, UNSPECIFIED                     

 

 2017            JOVON JOAQUIN DO            Ot            J44.9       
     CHRONIC OBSTRUCTIVE PULMONARY DISEASE, U                     

 

 2017            EMANIJOVON BLANCO DO M            Ot            Z72.0       
     TOBACCO USE                     

 

 2017            JOVON JOAQUIN DO            Ot            F41.9       
     ANXIETY DISORDER, UNSPECIFIED                     

 

 2017            JOVON JOAQUIN DO            Ot            J44.9       
     CHRONIC OBSTRUCTIVE PULMONARY DISEASE, U                     

 

 2017            EMANIJOVON BLANCO DO M            Ot            Z72.0       
     TOBACCO USE                     

 

 2017            TIM JOAQUIN DOSON M            Ot            F41.9       
     ANXIETY DISORDER, UNSPECIFIED                     

 

 2017            JOVON JOAQUIN DO M            Ot            J44.9       
     CHRONIC OBSTRUCTIVE PULMONARY DISEASE, U                     

 

 2017            EMANITIM BLANCO DOSON M            Ot            Z72.0       
     TOBACCO USE                     

 

 2017            JOVON JOAQUIN DO M            Ot            F41.9       
     ANXIETY DISORDER, UNSPECIFIED                     

 

 2017            JOVON JOAQUIN DO            Ot            J44.9       
     CHRONIC OBSTRUCTIVE PULMONARY DISEASE, U                     

 

 2017            EMANIJOVON BLANCO DO M            Ot            Z72.0       
     TOBACCO USE                     

 

 2018            JOVON JOAQUIN DO M            Ot            F41.9       
     ANXIETY DISORDER, UNSPECIFIED                     

 

 2018            JOVON JOAQUIN DO M            Ot            J44.9       
     CHRONIC OBSTRUCTIVE PULMONARY DISEASE, U                     

 

 2018            EMANITIM BLANCO DOSON M            Ot            Z72.0       
     TOBACCO USE                     

 

 2018            TIM JOAQUIN DOSON M            Ot            F41.9       
     ANXIETY DISORDER, UNSPECIFIED                     

 

 2018            JOVON JOAQUIN DO M            Ot            J44.9       
     CHRONIC OBSTRUCTIVE PULMONARY DISEASE, U                     

 

 2018            EMANITIM BLANCO DOSON M            Ot            Z72.0       
     TOBACCO USE                     

 

 2018            NINA HENLEY MD, Ot            721.8       
     SPINAL DISORDERS NEC                     

 

 2018            NINA HENLEY MD, Ot            724.1       
     PAIN IN THORACIC SPINE                     

 

 2018            NINA HENLEY MD            Ot            733.00      
      OSTEOPOROSIS NOS                     

 

 2018            NINA HENLEY MD, Ot            V49.81      
      ASYMPT POSTMENOPAUSAL STATUS (AGE-RELATE                     

 

 2018            NINA HENLEY MD            Ot            V82.89      
      SCREEN FOR OTH SPECIF CONDITIONS                     

 

 2018            NINA HENLEY MD, Ot            721.3       
     LUMBOSACRAL SPONDYLOSIS                     

 

 2018            NINA HENLEY MD, Ot            724.1       
     PAIN IN THORACIC SPINE                     

 

 2018            NINA HENLEY MD, Ot            733.00      
      OSTEOPOROSIS NOS                     

 

 2018            NINA HENLEY MD            Ot            737.30      
      IDIOPATHIC SCOLIOSIS                     

 

 2018            JOVON JOAQUIN DO, Ot            F41.9       
     ANXIETY DISORDER, UNSPECIFIED                     

 

 2018            JOVON JOAQUIN DO, Ot            J44.9       
     CHRONIC OBSTRUCTIVE PULMONARY DISEASE, U                     

 

 2018            JOVON JOAQUIN DO, Ot            Z72.0       
     TOBACCO USE                     



                                                                               
                                                                               
                                                                               
                                                                               
                                                                               
                                                                               
                                                                               
                                                                               
                                                                            



Procedures

      





 Code            Description            Performed By            Performed On   
     

 

             38.93                                  VENOUS CATHETERIZATION NEC 
                                  2010        

 

             96.04                                  INSERT ENDOTRACHEAL TUBE   
                                2010        

 

             96.71                                  CONTINUOUS INVASIVE 
MECHANICAL VENTILATI                                   2010        



                      



Results

      





 Test            Result            Range        









 Complete blood count (CBC) with automated white blood cell (WBC) differential 
- 16 06:50         









 Blood leukocytes automated count (number/volume)            10.1 10*3/uL      
      4.3-11.0        

 

 Blood erythrocytes automated count (number/volume)            4.93 10*6/uL    
        4.35-5.85        

 

 Venous blood hemoglobin measurement (mass/volume)            15.3 g/dL        
    11.5-16.0        

 

 Blood hematocrit (volume fraction)            46 %            35-52        

 

 Automated erythrocyte mean corpuscular volume            93 [foz_us]          
  80-99        

 

 Automated erythrocyte mean corpuscular hemoglobin (mass per erythrocyte)      
      31 pg            25-34        

 

 Automated erythrocyte mean corpuscular hemoglobin concentration measurement (
mass/volume)            33 g/dL            32-36        

 

 Automated erythrocyte distribution width ratio            13.2 %            
10.0-14.5        

 

 Automated blood platelet count (count/volume)            242 10*3/uL          
  130-400        

 

 Automated blood platelet mean volume measurement            10.0 [foz_us]     
       7.4-10.4        

 

 Automated blood neutrophils/100 leukocytes            52 %            42-75   
     

 

 Automated blood lymphocytes/100 leukocytes            25 %            12-44   
     

 

 Blood monocytes/100 leukocytes            9 %            0-12        

 

 Automated blood eosinophils/100 leukocytes            13 %            0-10    
    

 

 Automated blood basophils/100 leukocytes            1 %            0-10        

 

 Blood neutrophils automated count (number/volume)            5.2 10*3         
   1.8-7.8        

 

 Blood lymphocytes automated count (number/volume)            2.6 10*3         
   1.0-4.0        

 

 Blood monocytes automated count (number/volume)            0.9 10*3            
0.0-1.0        

 

 Automated eosinophil count            1.3 10*3/uL            0.0-0.3        

 

 Automated blood basophil count (count/volume)            0.1 10*3/uL          
  0.0-0.1        









 Comprehensive metabolic panel - 16 06:50         









 Serum or plasma sodium measurement (moles/volume)            141 mmol/L       
     135-145        

 

 Serum or plasma potassium measurement (moles/volume)            4.4 mmol/L    
        3.6-5.0        

 

 Serum or plasma chloride measurement (moles/volume)            105 mmol/L     
               

 

 Carbon dioxide            26 mmol/L            21-32        

 

 Serum or plasma anion gap determination (moles/volume)            10 mmol/L   
         5-14        

 

 Serum or plasma urea nitrogen measurement (mass/volume)            10 mg/dL   
         7-18        

 

 Serum or plasma creatinine measurement (mass/volume)            0.95 mg/dL    
        0.60-1.30        

 

 Serum or plasma urea nitrogen/creatinine mass ratio            11             
NRG        

 

 Serum or plasma creatinine measurement with calculation of estimated 
glomerular filtration rate            60             NRG        

 

 Serum or plasma glucose measurement (mass/volume)            106 mg/dL        
            

 

 Serum or plasma calcium measurement (mass/volume)            9.6 mg/dL        
    8.5-10.1        

 

 Serum or plasma total bilirubin measurement (mass/volume)            0.5 mg/dL
            0.1-1.0        

 

 Serum or plasma alkaline phosphatase measurement (enzymatic activity/volume)  
          81 U/L                    

 

 Serum or plasma aspartate aminotransferase measurement (enzymatic activity/
volume)            17 U/L            5-34        

 

 Serum or plasma alanine aminotransferase measurement (enzymatic activity/volume
)            20 U/L            0-55        

 

 Serum or plasma protein measurement (mass/volume)            6.3 g/dL         
   6.4-8.2        

 

 Serum or plasma albumin measurement (mass/volume)            4.2 g/dL         
   3.2-4.5        









 Fibrin D-dimer FEU measurement in platelet poor plasma (mass/volume) -  06:50         









 Fibrin D-dimer FEU measurement in platelet poor plasma (mass/volume)          
  < ug/mL            0.00-0.49        









 Serum or plasma lithium measurement (moles/volume) - 16 06:50         









 BNP level            13.6 pg/mL            <100.0        









 Blood manual differential performed detection - 16 06:50         









 Blood monocytes/100 leukocytes            7 %            NRG        

 

 Manual blood segmented neutrophils/100 leukocytes            53 %            
NRG        

 

 Blood band neutrophils/100 leukocytes            0 %            NRG        

 

 Manual blood lymphocytes/100 leukocytes            33 %            NRG        

 

 Manual eosinophils/100 leukocytes in nose            7 %            NRG        

 

 Manual blood basophils/100 leukocytes            0 %            NRG        

 

 Blood erythrocyte morphology finding identification            NORMAL         
    NRG        









 Blood lactic acid measurement (moles/volume) - 16 07:46         









 Blood lactic acid measurement (moles/volume)            1.4 mmol/L            
0.5-2.0        









 Bacterial blood culture - 16 07:46         









 Bacterial blood culture            NG             NRG        









 Bacterial blood culture - 16 09:17         









 Bacterial blood culture            NG             NRG        









 Complete blood count (CBC) with automated white blood cell (WBC) differential 
- 16 05:53         









 Blood leukocytes automated count (number/volume)            20.5 10*3/uL      
      4.3-11.0        

 

 Blood erythrocytes automated count (number/volume)            4.33 10*6/uL    
        4.35-5.85        

 

 Venous blood hemoglobin measurement (mass/volume)            13.5 g/dL        
    11.5-16.0        

 

 Blood hematocrit (volume fraction)            41 %            35-52        

 

 Automated erythrocyte mean corpuscular volume            94 [foz_us]          
  80-99        

 

 Automated erythrocyte mean corpuscular hemoglobin (mass per erythrocyte)      
      31 pg            25-34        

 

 Automated erythrocyte mean corpuscular hemoglobin concentration measurement (
mass/volume)            33 g/dL            32-36        

 

 Automated erythrocyte distribution width ratio            13.2 %            
10.0-14.5        

 

 Automated blood platelet count (count/volume)            287 10*3/uL          
  130-400        

 

 Automated blood platelet mean volume measurement            9.4 [foz_us]      
      7.4-10.4        

 

 Automated blood neutrophils/100 leukocytes            94 %            42-75   
     

 

 Automated blood lymphocytes/100 leukocytes            4 %            12-44    
    

 

 Blood monocytes/100 leukocytes            2 %            0-12        

 

 Automated blood eosinophils/100 leukocytes            0 %            0-10     
   

 

 Automated blood basophils/100 leukocytes            0 %            0-10        

 

 Blood neutrophils automated count (number/volume)            19.3 10*3        
    1.8-7.8        

 

 Blood lymphocytes automated count (number/volume)            0.7 10*3         
   1.0-4.0        

 

 Blood monocytes automated count (number/volume)            0.5 10*3            
0.0-1.0        

 

 Automated eosinophil count            0.0 10*3/uL            0.0-0.3        

 

 Automated blood basophil count (count/volume)            0.0 10*3/uL          
  0.0-0.1        









 Complete blood count (CBC) with automated white blood cell (WBC) differential 
- 16 05:17         









 Blood leukocytes automated count (number/volume)            9.8 10*3/uL       
     4.3-11.0        

 

 Blood erythrocytes automated count (number/volume)            4.00 10*6/uL    
        4.35-5.85        

 

 Venous blood hemoglobin measurement (mass/volume)            12.5 g/dL        
    11.5-16.0        

 

 Blood hematocrit (volume fraction)            38 %            35-52        

 

 Automated erythrocyte mean corpuscular volume            96 [foz_us]          
  80-99        

 

 Automated erythrocyte mean corpuscular hemoglobin (mass per erythrocyte)      
      31 pg            25-34        

 

 Automated erythrocyte mean corpuscular hemoglobin concentration measurement (
mass/volume)            33 g/dL            32-36        

 

 Automated erythrocyte distribution width ratio            13.4 %            
10.0-14.5        

 

 Automated blood platelet count (count/volume)            253 10*3/uL          
  130-400        

 

 Automated blood platelet mean volume measurement            9.7 [foz_us]      
      7.4-10.4        

 

 Automated blood neutrophils/100 leukocytes            87 %            42-75   
     

 

 Automated blood lymphocytes/100 leukocytes            6 %            12-44    
    

 

 Blood monocytes/100 leukocytes            7 %            0-12        

 

 Automated blood eosinophils/100 leukocytes            0 %            0-10     
   

 

 Automated blood basophils/100 leukocytes            0 %            0-10        

 

 Blood neutrophils automated count (number/volume)            8.5 10*3         
   1.8-7.8        

 

 Blood lymphocytes automated count (number/volume)            0.6 10*3         
   1.0-4.0        

 

 Blood monocytes automated count (number/volume)            0.7 10*3            
0.0-1.0        

 

 Automated eosinophil count            0.0 10*3/uL            0.0-0.3        

 

 Automated blood basophil count (count/volume)            0.0 10*3/uL          
  0.0-0.1        









 Complete blood count (CBC) with automated white blood cell (WBC) differential 
- 16 12:20         









 Blood leukocytes automated count (number/volume)            5.4 10*3/uL       
     4.3-11.0        

 

 Blood erythrocytes automated count (number/volume)            4.54 10*6/uL    
        4.35-5.85        

 

 Venous blood hemoglobin measurement (mass/volume)            14.0 g/dL        
    11.5-16.0        

 

 Blood hematocrit (volume fraction)            42 %            35-52        

 

 Automated erythrocyte mean corpuscular volume            91 [foz_us]          
  80-99        

 

 Automated erythrocyte mean corpuscular hemoglobin (mass per erythrocyte)      
      31 pg            25-34        

 

 Automated erythrocyte mean corpuscular hemoglobin concentration measurement (
mass/volume)            34 g/dL            32-36        

 

 Automated erythrocyte distribution width ratio            13.3 %            
10.0-14.5        

 

 Automated blood platelet count (count/volume)            184 10*3/uL          
  130-400        

 

 Automated blood platelet mean volume measurement            9.4 [foz_us]      
      7.4-10.4        

 

 Automated blood neutrophils/100 leukocytes            69 %            42-75   
     

 

 Automated blood lymphocytes/100 leukocytes            16 %            12-44   
     

 

 Blood monocytes/100 leukocytes            11 %            0-12        

 

 Automated blood eosinophils/100 leukocytes            4 %            0-10     
   

 

 Automated blood basophils/100 leukocytes            1 %            0-10        

 

 Blood neutrophils automated count (number/volume)            3.7 10*3         
   1.8-7.8        

 

 Blood lymphocytes automated count (number/volume)            0.9 10*3         
   1.0-4.0        

 

 Blood monocytes automated count (number/volume)            0.6 10*3            
0.0-1.0        

 

 Automated eosinophil count            0.2 10*3/uL            0.0-0.3        

 

 Automated blood basophil count (count/volume)            0.0 10*3/uL          
  0.0-0.1        









 Comprehensive metabolic panel - 16 12:20         









 Serum or plasma sodium measurement (moles/volume)            137 mmol/L       
     135-145        

 

 Serum or plasma potassium measurement (moles/volume)            4.0 mmol/L    
        3.6-5.0        

 

 Serum or plasma chloride measurement (moles/volume)            103 mmol/L     
               

 

 Carbon dioxide            25 mmol/L            21-32        

 

 Serum or plasma anion gap determination (moles/volume)            9 mmol/L    
        5-14        

 

 Serum or plasma urea nitrogen measurement (mass/volume)            9 mg/dL    
        7-18        

 

 Serum or plasma creatinine measurement (mass/volume)            0.88 mg/dL    
        0.60-1.30        

 

 Serum or plasma urea nitrogen/creatinine mass ratio            10             
NRG        

 

 Serum or plasma creatinine measurement with calculation of estimated 
glomerular filtration rate            >             NRG        

 

 Serum or plasma glucose measurement (mass/volume)            110 mg/dL        
            

 

 Serum or plasma calcium measurement (mass/volume)            9.5 mg/dL        
    8.5-10.1        

 

 Serum or plasma total bilirubin measurement (mass/volume)            0.9 mg/dL
            0.1-1.0        

 

 Serum or plasma alkaline phosphatase measurement (enzymatic activity/volume)  
          74 U/L                    

 

 Serum or plasma aspartate aminotransferase measurement (enzymatic activity/
volume)            18 U/L            5-34        

 

 Serum or plasma alanine aminotransferase measurement (enzymatic activity/volume
)            23 U/L            0-55        

 

 Serum or plasma protein measurement (mass/volume)            6.3 g/dL         
   6.4-8.2        

 

 Serum or plasma albumin measurement (mass/volume)            4.2 g/dL         
   3.2-4.5        









 Complete blood count (CBC) with automated white blood cell (WBC) differential 
- 16 19:30         









 Blood leukocytes automated count (number/volume)            7.0 10*3/uL       
     4.3-11.0        

 

 Blood erythrocytes automated count (number/volume)            4.28 10*6/uL    
        4.35-5.85        

 

 Venous blood hemoglobin measurement (mass/volume)            13.2 g/dL        
    11.5-16.0        

 

 Blood hematocrit (volume fraction)            39 %            35-52        

 

 Automated erythrocyte mean corpuscular volume            91 [foz_us]          
  80-99        

 

 Automated erythrocyte mean corpuscular hemoglobin (mass per erythrocyte)      
      31 pg            25-34        

 

 Automated erythrocyte mean corpuscular hemoglobin concentration measurement (
mass/volume)            34 g/dL            32-36        

 

 Automated erythrocyte distribution width ratio            13.8 %            
10.0-14.5        

 

 Automated blood platelet count (count/volume)            453 10*3/uL          
  130-400        

 

 Automated blood platelet mean volume measurement            8.3 [foz_us]      
      7.4-10.4        

 

 Automated blood neutrophils/100 leukocytes            47 %            42-75   
     

 

 Automated blood lymphocytes/100 leukocytes            37 %            12-44   
     

 

 Blood monocytes/100 leukocytes            10 %            0-12        

 

 Automated blood eosinophils/100 leukocytes            3 %            0-10     
   

 

 Automated blood basophils/100 leukocytes            2 %            0-10        

 

 Blood neutrophils automated count (number/volume)            3.3 10*3         
   1.8-7.8        

 

 Blood lymphocytes automated count (number/volume)            2.6 10*3         
   1.0-4.0        

 

 Blood monocytes automated count (number/volume)            0.7 10*3            
0.0-1.0        

 

 Automated eosinophil count            0.2 10*3/uL            0.0-0.3        

 

 Automated blood basophil count (count/volume)            0.1 10*3/uL          
  0.0-0.1        









 PT panel in platelet poor plasma by coagulation assay - 16 19:30         









 Prothrombin time (PT) in platelet poor plasma by coagulation assay            
12.8 s            12.2-14.7        

 

 INR in platelet poor plasma or blood by coagulation assay            1.0      
       0.8-1.4        









 Activated partial thromboplastin time (aPTT) in platelet poor plasma 
bycoagulation assay - 16 19:30         









 Activated partial thromboplastin time (aPTT) in platelet poor plasma 
bycoagulation assay            28 s            24-35        









 Comprehensive metabolic panel - 16 19:30         









 Serum or plasma sodium measurement (moles/volume)            140 mmol/L       
     135-145        

 

 Serum or plasma potassium measurement (moles/volume)            3.3 mmol/L    
        3.6-5.0        

 

 Serum or plasma chloride measurement (moles/volume)            106 mmol/L     
               

 

 Carbon dioxide            27 mmol/L            21-32        

 

 Serum or plasma anion gap determination (moles/volume)            7 mmol/L    
        5-14        

 

 Serum or plasma urea nitrogen measurement (mass/volume)            11 mg/dL   
         7-18        

 

 Serum or plasma creatinine measurement (mass/volume)            0.75 mg/dL    
        0.60-1.30        

 

 Serum or plasma urea nitrogen/creatinine mass ratio            15             
NRG        

 

 Serum or plasma creatinine measurement with calculation of estimated 
glomerular filtration rate            >             NRG        

 

 Serum or plasma glucose measurement (mass/volume)            63 mg/dL         
           

 

 Serum or plasma calcium measurement (mass/volume)            9.5 mg/dL        
    8.5-10.1        

 

 Serum or plasma total bilirubin measurement (mass/volume)            0.5 mg/dL
            0.1-1.0        

 

 Serum or plasma alkaline phosphatase measurement (enzymatic activity/volume)  
          74 U/L                    

 

 Serum or plasma aspartate aminotransferase measurement (enzymatic activity/
volume)            17 U/L            5-34        

 

 Serum or plasma alanine aminotransferase measurement (enzymatic activity/volume
)            18 U/L            0-55        

 

 Serum or plasma protein measurement (mass/volume)            6.1 g/dL         
   6.4-8.2        

 

 Serum or plasma albumin measurement (mass/volume)            4.1 g/dL         
   3.2-4.5        









 Magnesium - 16 19:30         









 Magnesium            2.0 mg/dL            1.8-2.4        









 Serum or plasma creatine kinase measurement (enzymatic activity/volume) -  19:30         









 Serum or plasma creatine kinase measurement (enzymatic activity/volume)       
     81 U/L                    









 Serum or plasma lithium measurement (moles/volume) - 16 19:30         









 BNP level            13.6 pg/mL            <100.0        









 Serum or plasma creatine kinase MB measurement (enzymatic activity/volume) -  19:30         









 Serum or plasma creatine kinase MB measurement (enzymatic activity/volume)    
        3.7 ng/mL            <6.6        









 Serum or plasma troponin i.cardiac measurement (mass/volume) - 16 19:30 
        









 Serum or plasma troponin i.cardiac measurement (mass/volume)            < ng/
mL            <0.30        









 Serum or plasma thyrotropin measurement by detection limit <=0.05 miu/l (units/
volume) - 16 19:30         









 Serum or plasma thyrotropin measurement by detection limit <=0.05 miu/l (units/
volume)            4.09 u[iU]/mL            0.35-4.94        









 Complete blood count (CBC) with automated white blood cell (WBC) differential 
- 17 15:32         









 Blood leukocytes automated count (number/volume)            11.0 10*3/uL      
      4.3-11.0        

 

 Blood erythrocytes automated count (number/volume)            4.59 10*6/uL    
        4.35-5.85        

 

 Venous blood hemoglobin measurement (mass/volume)            13.9 g/dL        
    11.5-16.0        

 

 Blood hematocrit (volume fraction)            42 %            35-52        

 

 Automated erythrocyte mean corpuscular volume            91 [foz_us]          
  80-99        

 

 Automated erythrocyte mean corpuscular hemoglobin (mass per erythrocyte)      
      30 pg            25-34        

 

 Automated erythrocyte mean corpuscular hemoglobin concentration measurement (
mass/volume)            33 g/dL            32-36        

 

 Automated erythrocyte distribution width ratio            13.1 %            
10.0-14.5        

 

 Automated blood platelet count (count/volume)            397 10*3/uL          
  130-400        

 

 Automated blood platelet mean volume measurement            8.9 [foz_us]      
      7.4-10.4        

 

 Automated blood neutrophils/100 leukocytes            57 %            42-75   
     

 

 Automated blood lymphocytes/100 leukocytes            21 %            12-44   
     

 

 Blood monocytes/100 leukocytes            12 %            0-12        

 

 Automated blood eosinophils/100 leukocytes            10 %            0-10    
    

 

 Automated blood basophils/100 leukocytes            1 %            0-10        

 

 Blood neutrophils automated count (number/volume)            6.3 10*3         
   1.8-7.8        

 

 Blood lymphocytes automated count (number/volume)            2.3 10*3         
   1.0-4.0        

 

 Blood monocytes automated count (number/volume)            1.3 10*3            
0.0-1.0        

 

 Automated eosinophil count            1.1 10*3/uL            0.0-0.3        

 

 Automated blood basophil count (count/volume)            0.1 10*3/uL          
  0.0-0.1        









 Comprehensive metabolic panel - 17 15:32         









 Serum or plasma sodium measurement (moles/volume)            138 mmol/L       
     135-145        

 

 Serum or plasma potassium measurement (moles/volume)            4.4 mmol/L    
        3.6-5.0        

 

 Serum or plasma chloride measurement (moles/volume)            100 mmol/L     
               

 

 Carbon dioxide            30 mmol/L            21-32        

 

 Serum or plasma anion gap determination (moles/volume)            8 mmol/L    
        5-14        

 

 Serum or plasma urea nitrogen measurement (mass/volume)            22 mg/dL   
         7-18        

 

 Serum or plasma creatinine measurement (mass/volume)            0.84 mg/dL    
        0.60-1.30        

 

 Serum or plasma urea nitrogen/creatinine mass ratio            26             
NRG        

 

 Serum or plasma creatinine measurement with calculation of estimated 
glomerular filtration rate            >             NRG        

 

 Serum or plasma glucose measurement (mass/volume)            103 mg/dL        
            

 

 Serum or plasma calcium measurement (mass/volume)            10.0 mg/dL       
     8.5-10.1        

 

 Serum or plasma total bilirubin measurement (mass/volume)            0.4 mg/dL
            0.1-1.0        

 

 Serum or plasma alkaline phosphatase measurement (enzymatic activity/volume)  
          76 U/L                    

 

 Serum or plasma aspartate aminotransferase measurement (enzymatic activity/
volume)            17 U/L            5-34        

 

 Serum or plasma alanine aminotransferase measurement (enzymatic activity/volume
)            16 U/L            0-55        

 

 Serum or plasma protein measurement (mass/volume)            7.0 g/dL         
   6.4-8.2        

 

 Serum or plasma albumin measurement (mass/volume)            4.0 g/dL         
   3.2-4.5        









 Arterial blood gas measurement - 17 15:50         









 Blood pCO2            37 mm[Hg]            35-45        

 

 Blood pO2            92 mm[Hg]            79-93        

 

 Arterial blood bicarbonate measurement (moles/volume)            27 mmol/L    
        23-27        

 

 Arterial blood base excess by calculation            2.5 mmol/L            -2.5
-2.5        

 

 Arterial blood oxygen saturation measurement            98 %            
        

 

 * Inhaled oxygen flow rate            2L             NRG        

 

 Arterial blood pH measurement with patient temperature correction            
7.47             7.37-7.43        

 

 Arterial blood carbon dioxide, total measurement (moles/volume)            
27.8 mmol/L            21.0-31.0        

 

 Body site            RIGHT RADIAL             NRG        

 

 Assessment of wrist artery patency prior to arterial puncture            
POSITIVE             NRG        

 

 Setting of ventilation mode            NO             NRG        

 

 Measurement of body temperature            96.0             NRG        









 Complete blood count (CBC) with automated white blood cell (WBC) differential 
- 10/13/17 02:45         









 Blood leukocytes automated count (number/volume)            12.8 10*3/uL      
      4.3-11.0        

 

 Blood erythrocytes automated count (number/volume)            4.93 10*6/uL    
        4.35-5.85        

 

 Venous blood hemoglobin measurement (mass/volume)            14.7 g/dL        
    11.5-16.0        

 

 Blood hematocrit (volume fraction)            46 %            35-52        

 

 Automated erythrocyte mean corpuscular volume            93 [foz_us]          
  80-99        

 

 Automated erythrocyte mean corpuscular hemoglobin (mass per erythrocyte)      
      30 pg            25-34        

 

 Automated erythrocyte mean corpuscular hemoglobin concentration measurement (
mass/volume)            32 g/dL            32-36        

 

 Automated erythrocyte distribution width ratio            13.8 %            
10.0-14.5        

 

 Automated blood platelet count (count/volume)            259 10*3/uL          
  130-400        

 

 Automated blood platelet mean volume measurement            9.0 [foz_us]      
      7.4-10.4        

 

 Automated blood neutrophils/100 leukocytes            66 %            42-75   
     

 

 Automated blood lymphocytes/100 leukocytes            21 %            12-44   
     

 

 Blood monocytes/100 leukocytes            10 %            0-12        

 

 Automated blood eosinophils/100 leukocytes            3 %            0-10     
   

 

 Automated blood basophils/100 leukocytes            1 %            0-10        

 

 Blood neutrophils automated count (number/volume)            8.4 10*3         
   1.8-7.8        

 

 Blood lymphocytes automated count (number/volume)            2.7 10*3         
   1.0-4.0        

 

 Blood monocytes automated count (number/volume)            1.3 10*3            
0.0-1.0        

 

 Automated eosinophil count            0.4 10*3/uL            0.0-0.3        

 

 Automated blood basophil count (count/volume)            0.1 10*3/uL          
  0.0-0.1        









 Comprehensive metabolic panel - 10/13/17 02:45         









 Serum or plasma sodium measurement (moles/volume)            138 mmol/L       
     135-145        

 

 Serum or plasma potassium measurement (moles/volume)            4.1 mmol/L    
        3.6-5.0        

 

 Serum or plasma chloride measurement (moles/volume)            100 mmol/L     
               

 

 Carbon dioxide            24 mmol/L            21-32        

 

 Serum or plasma anion gap determination (moles/volume)            14 mmol/L   
         5-14        

 

 Serum or plasma urea nitrogen measurement (mass/volume)            9 mg/dL    
        7-18        

 

 Serum or plasma creatinine measurement (mass/volume)            1.03 mg/dL    
        0.60-1.30        

 

 Serum or plasma urea nitrogen/creatinine mass ratio            9             
NRG        

 

 Serum or plasma creatinine measurement with calculation of estimated 
glomerular filtration rate            54             NRG        

 

 Serum or plasma glucose measurement (mass/volume)            81 mg/dL         
           

 

 Serum or plasma calcium measurement (mass/volume)            9.3 mg/dL        
    8.5-10.1        

 

 Serum or plasma total bilirubin measurement (mass/volume)            0.4 mg/dL
            0.1-1.0        

 

 Serum or plasma alkaline phosphatase measurement (enzymatic activity/volume)  
          86 U/L                    

 

 Serum or plasma aspartate aminotransferase measurement (enzymatic activity/
volume)            26 U/L            5-34        

 

 Serum or plasma alanine aminotransferase measurement (enzymatic activity/volume
)            25 U/L            0-55        

 

 Serum or plasma protein measurement (mass/volume)            6.9 g/dL         
   6.4-8.2        

 

 Serum or plasma albumin measurement (mass/volume)            4.3 g/dL         
   3.2-4.5        









 Serum or plasma C reactive protein measurement (mass/volume) - 10/13/17 02:45 
        









 Serum or plasma C reactive protein measurement (mass/volume)            0.27 mg
/dL            0.00-0.50        









 Arterial blood gas measurement - 10/13/17 03:03         









 Blood pCO2            49 mm[Hg]            35-45        

 

 Blood pO2            151 mm[Hg]            79-93        

 

 Arterial blood bicarbonate measurement (moles/volume)            29 mmol/L    
        23-27        

 

 Arterial blood base excess by calculation            4.1 mmol/L            -2.5
-2.5        

 

 Arterial blood oxygen saturation measurement            99 %            
        

 

 * Inhaled oxygen flow rate            6L             NRG        

 

 Arterial blood pH measurement with patient temperature correction            
7.38             7.37-7.43        

 

 Arterial blood carbon dioxide, total measurement (moles/volume)            
30.6 mmol/L            21.0-31.0        

 

 Body site            R RAD             NRG        

 

 Assessment of wrist artery patency prior to arterial puncture            YES-
POS             NRG        

 

 Setting of ventilation mode            NO             NRG        

 

 Measurement of body temperature            97.2             NRG        









 Methicillin resistant Staphylococcus aureus (MRSA) screening culture - 10/13/
17 08:40         









 Methicillin resistant Staphylococcus aureus (MRSA) screening culture          
  NEG             NRG        









 Complete blood count (CBC) with automated white blood cell (WBC) differential 
- 10/14/17 03:49         









 Blood leukocytes automated count (number/volume)            5.5 10*3/uL       
     4.3-11.0        

 

 Blood erythrocytes automated count (number/volume)            4.47 10*6/uL    
        4.35-5.85        

 

 Venous blood hemoglobin measurement (mass/volume)            13.4 g/dL        
    11.5-16.0        

 

 Blood hematocrit (volume fraction)            41 %            35-52        

 

 Automated erythrocyte mean corpuscular volume            93 [foz_us]          
  80-99        

 

 Automated erythrocyte mean corpuscular hemoglobin (mass per erythrocyte)      
      30 pg            25-34        

 

 Automated erythrocyte mean corpuscular hemoglobin concentration measurement (
mass/volume)            32 g/dL            32-36        

 

 Automated erythrocyte distribution width ratio            13.7 %            
10.0-14.5        

 

 Automated blood platelet count (count/volume)            221 10*3/uL          
  130-400        

 

 Automated blood platelet mean volume measurement            9.0 [foz_us]      
      7.4-10.4        

 

 Automated blood neutrophils/100 leukocytes            86 %            42-75   
     

 

 Automated blood lymphocytes/100 leukocytes            9 %            12-44    
    

 

 Blood monocytes/100 leukocytes            4 %            0-12        

 

 Automated blood eosinophils/100 leukocytes            0 %            0-10     
   

 

 Automated blood basophils/100 leukocytes            0 %            0-10        

 

 Blood neutrophils automated count (number/volume)            4.8 10*3         
   1.8-7.8        

 

 Blood lymphocytes automated count (number/volume)            0.5 10*3         
   1.0-4.0        

 

 Blood monocytes automated count (number/volume)            0.2 10*3            
0.0-1.0        

 

 Automated eosinophil count            0.0 10*3/uL            0.0-0.3        

 

 Automated blood basophil count (count/volume)            0.0 10*3/uL          
  0.0-0.1        









 Whole blood basic metabolic panel - 10/14/17 03:49         









 Serum or plasma sodium measurement (moles/volume)            140 mmol/L       
     135-145        

 

 Serum or plasma potassium measurement (moles/volume)            3.9 mmol/L    
        3.6-5.0        

 

 Serum or plasma chloride measurement (moles/volume)            106 mmol/L     
               

 

 Carbon dioxide            24 mmol/L            21-32        

 

 Serum or plasma anion gap determination (moles/volume)            10 mmol/L   
         5-14        

 

 Serum or plasma urea nitrogen measurement (mass/volume)            19 mg/dL   
         7-18        

 

 Serum or plasma creatinine measurement (mass/volume)            0.91 mg/dL    
        0.60-1.30        

 

 Serum or plasma urea nitrogen/creatinine mass ratio            21             
NRG        

 

 Serum or plasma creatinine measurement with calculation of estimated 
glomerular filtration rate            >             NRG        

 

 Serum or plasma glucose measurement (mass/volume)            136 mg/dL        
            

 

 Serum or plasma calcium measurement (mass/volume)            8.7 mg/dL        
    8.5-10.1        









 Serum or plasma phosphate measurement (mass/volume) - 10/14/17 03:49         









 Serum or plasma phosphate measurement (mass/volume)            2.7 mg/dL      
      2.3-4.7        









 Magnesium - 10/14/17 03:49         









 Magnesium            2.4 mg/dL            1.8-2.4        









 Complete blood count (CBC) with automated white blood cell (WBC) differential 
- 10/15/17 04:40         









 Blood leukocytes automated count (number/volume)            13.7 10*3/uL      
      4.3-11.0        

 

 Blood erythrocytes automated count (number/volume)            4.00 10*6/uL    
        4.35-5.85        

 

 Venous blood hemoglobin measurement (mass/volume)            12.0 g/dL        
    11.5-16.0        

 

 Blood hematocrit (volume fraction)            38 %            35-52        

 

 Automated erythrocyte mean corpuscular volume            94 [foz_us]          
  80-99        

 

 Automated erythrocyte mean corpuscular hemoglobin (mass per erythrocyte)      
      30 pg            25-34        

 

 Automated erythrocyte mean corpuscular hemoglobin concentration measurement (
mass/volume)            32 g/dL            32-36        

 

 Automated erythrocyte distribution width ratio            13.9 %            
10.0-14.5        

 

 Automated blood platelet count (count/volume)            204 10*3/uL          
  130-400        

 

 Automated blood platelet mean volume measurement            9.1 [foz_us]      
      7.4-10.4        

 

 Automated blood neutrophils/100 leukocytes            91 %            42-75   
     

 

 Automated blood lymphocytes/100 leukocytes            4 %            12-44    
    

 

 Blood monocytes/100 leukocytes            5 %            0-12        

 

 Automated blood eosinophils/100 leukocytes            0 %            0-10     
   

 

 Automated blood basophils/100 leukocytes            0 %            0-10        

 

 Blood neutrophils automated count (number/volume)            12.4 10*3        
    1.8-7.8        

 

 Blood lymphocytes automated count (number/volume)            0.6 10*3         
   1.0-4.0        

 

 Blood monocytes automated count (number/volume)            0.7 10*3            
0.0-1.0        

 

 Automated eosinophil count            0.0 10*3/uL            0.0-0.3        

 

 Automated blood basophil count (count/volume)            0.0 10*3/uL          
  0.0-0.1        









 Whole blood basic metabolic panel - 10/15/17 04:40         









 Serum or plasma sodium measurement (moles/volume)            141 mmol/L       
     135-145        

 

 Serum or plasma potassium measurement (moles/volume)            3.8 mmol/L    
        3.6-5.0        

 

 Serum or plasma chloride measurement (moles/volume)            109 mmol/L     
               

 

 Carbon dioxide            23 mmol/L            21-32        

 

 Serum or plasma anion gap determination (moles/volume)            9 mmol/L    
        5-14        

 

 Serum or plasma urea nitrogen measurement (mass/volume)            18 mg/dL   
         7-18        

 

 Serum or plasma creatinine measurement (mass/volume)            0.82 mg/dL    
        0.60-1.30        

 

 Serum or plasma urea nitrogen/creatinine mass ratio            22             
NRG        

 

 Serum or plasma creatinine measurement with calculation of estimated 
glomerular filtration rate            >             NRG        

 

 Serum or plasma glucose measurement (mass/volume)            120 mg/dL        
            

 

 Serum or plasma calcium measurement (mass/volume)            8.5 mg/dL        
    8.5-10.1        









 Serum or plasma phosphate measurement (mass/volume) - 10/15/17 04:40         









 Serum or plasma phosphate measurement (mass/volume)            2.5 mg/dL      
      2.3-4.7        









 Magnesium - 10/15/17 04:40         









 Magnesium            2.3 mg/dL            1.8-2.4        









 Blood manual differential performed detection - 10/15/17 04:40         









 Blood monocytes/100 leukocytes            1 %            NRG        

 

 Manual blood segmented neutrophils/100 leukocytes            90 %            
NRG        

 

 Blood band neutrophils/100 leukocytes            2 %            NRG        

 

 Manual blood lymphocytes/100 leukocytes            5 %            NRG        

 

 Manual eosinophils/100 leukocytes in nose            0 %            NRG        

 

 Manual blood basophils/100 leukocytes            0 %            NRG        

 

 Blood lymphocytes variant/100 leukocytes            2 %            NRG        

 

 Blood erythrocyte morphology finding identification            NORMAL         
    NRG        









 Complete blood count (CBC) with automated white blood cell (WBC) differential 
- 10/16/17 04:35         









 Blood leukocytes automated count (number/volume)            10.6 10*3/uL      
      4.3-11.0        

 

 Blood erythrocytes automated count (number/volume)            3.99 10*6/uL    
        4.35-5.85        

 

 Venous blood hemoglobin measurement (mass/volume)            11.9 g/dL        
    11.5-16.0        

 

 Blood hematocrit (volume fraction)            38 %            35-52        

 

 Automated erythrocyte mean corpuscular volume            94 [foz_us]          
  80-99        

 

 Automated erythrocyte mean corpuscular hemoglobin (mass per erythrocyte)      
      30 pg            25-34        

 

 Automated erythrocyte mean corpuscular hemoglobin concentration measurement (
mass/volume)            32 g/dL            32-36        

 

 Automated erythrocyte distribution width ratio            13.6 %            
10.0-14.5        

 

 Automated blood platelet count (count/volume)            195 10*3/uL          
  130-400        

 

 Automated blood platelet mean volume measurement            8.9 [foz_us]      
      7.4-10.4        

 

 Automated blood neutrophils/100 leukocytes            94 %            42-75   
     

 

 Automated blood lymphocytes/100 leukocytes            4 %            12-44    
    

 

 Blood monocytes/100 leukocytes            3 %            0-12        

 

 Automated blood eosinophils/100 leukocytes            0 %            0-10     
   

 

 Automated blood basophils/100 leukocytes            0 %            0-10        

 

 Blood neutrophils automated count (number/volume)            10.0 10*3        
    1.8-7.8        

 

 Blood lymphocytes automated count (number/volume)            0.4 10*3         
   1.0-4.0        

 

 Blood monocytes automated count (number/volume)            0.3 10*3            
0.0-1.0        

 

 Automated eosinophil count            0.0 10*3/uL            0.0-0.3        

 

 Automated blood basophil count (count/volume)            0.0 10*3/uL          
  0.0-0.1        









 Whole blood basic metabolic panel - 10/16/17 04:35         









 Serum or plasma sodium measurement (moles/volume)            141 mmol/L       
     135-145        

 

 Serum or plasma potassium measurement (moles/volume)            3.6 mmol/L    
        3.6-5.0        

 

 Serum or plasma chloride measurement (moles/volume)            107 mmol/L     
               

 

 Carbon dioxide            25 mmol/L            21-32        

 

 Serum or plasma anion gap determination (moles/volume)            9 mmol/L    
        5-14        

 

 Serum or plasma urea nitrogen measurement (mass/volume)            19 mg/dL   
         7-18        

 

 Serum or plasma creatinine measurement (mass/volume)            0.73 mg/dL    
        0.60-1.30        

 

 Serum or plasma urea nitrogen/creatinine mass ratio            26             
NRG        

 

 Serum or plasma creatinine measurement with calculation of estimated 
glomerular filtration rate            >             NRG        

 

 Serum or plasma glucose measurement (mass/volume)            128 mg/dL        
            

 

 Serum or plasma calcium measurement (mass/volume)            8.2 mg/dL        
    8.5-10.1        









 Serum or plasma phosphate measurement (mass/volume) - 10/16/17 04:35         









 Serum or plasma phosphate measurement (mass/volume)            2.4 mg/dL      
      2.3-4.7        









 Magnesium - 10/16/17 04:35         









 Magnesium            2.2 mg/dL            1.8-2.4        









 Arterial blood gas measurement - 10/16/17 06:45         









 Blood pCO2            46 mm[Hg]            35-45        

 

 Blood pO2            81 mm[Hg]            79-93        

 

 Arterial blood bicarbonate measurement (moles/volume)            27 mmol/L    
        23-27        

 

 Arterial blood base excess by calculation            2.4 mmol/L            -2.5
-2.5        

 

 Arterial blood oxygen saturation measurement            97 %            
        

 

 * Inhaled oxygen flow rate            30% BIPAP             NRG        

 

 Arterial blood pH measurement with patient temperature correction            
7.39             7.37-7.43        

 

 Arterial blood carbon dioxide, total measurement (moles/volume)            
28.6 mmol/L            21.0-31.0        

 

 Body site            R RAD             NRG        

 

 Assessment of wrist artery patency prior to arterial puncture            YES-
POS             NRG        

 

 Setting of ventilation mode            NO             NRG        

 

 Measurement of body temperature            97.3             NRG        









 Complete blood count (CBC) with automated white blood cell (WBC) differential 
- 10/17/17 05:20         









 Blood leukocytes automated count (number/volume)            12.0 10*3/uL      
      4.3-11.0        

 

 Blood erythrocytes automated count (number/volume)            4.50 10*6/uL    
        4.35-5.85        

 

 Venous blood hemoglobin measurement (mass/volume)            13.5 g/dL        
    11.5-16.0        

 

 Blood hematocrit (volume fraction)            42 %            35-52        

 

 Automated erythrocyte mean corpuscular volume            93 [foz_us]          
  80-99        

 

 Automated erythrocyte mean corpuscular hemoglobin (mass per erythrocyte)      
      30 pg            25-34        

 

 Automated erythrocyte mean corpuscular hemoglobin concentration measurement (
mass/volume)            32 g/dL            32-36        

 

 Automated erythrocyte distribution width ratio            13.6 %            
10.0-14.5        

 

 Automated blood platelet count (count/volume)            220 10*3/uL          
  130-400        

 

 Automated blood platelet mean volume measurement            9.2 [foz_us]      
      7.4-10.4        

 

 Automated blood neutrophils/100 leukocytes            93 %            42-75   
     

 

 Automated blood lymphocytes/100 leukocytes            3 %            12-44    
    

 

 Blood monocytes/100 leukocytes            4 %            0-12        

 

 Automated blood eosinophils/100 leukocytes            0 %            0-10     
   

 

 Automated blood basophils/100 leukocytes            0 %            0-10        

 

 Blood neutrophils automated count (number/volume)            11.2 10*3        
    1.8-7.8        

 

 Blood lymphocytes automated count (number/volume)            0.4 10*3         
   1.0-4.0        

 

 Blood monocytes automated count (number/volume)            0.4 10*3            
0.0-1.0        

 

 Automated eosinophil count            0.0 10*3/uL            0.0-0.3        

 

 Automated blood basophil count (count/volume)            0.0 10*3/uL          
  0.0-0.1        









 Whole blood basic metabolic panel - 10/17/17 05:20         









 Serum or plasma sodium measurement (moles/volume)            140 mmol/L       
     135-145        

 

 Serum or plasma potassium measurement (moles/volume)            3.9 mmol/L    
        3.6-5.0        

 

 Serum or plasma chloride measurement (moles/volume)            100 mmol/L     
               

 

 Carbon dioxide            27 mmol/L            21-32        

 

 Serum or plasma anion gap determination (moles/volume)            13 mmol/L   
         5-14        

 

 Serum or plasma urea nitrogen measurement (mass/volume)            20 mg/dL   
         7-18        

 

 Serum or plasma creatinine measurement (mass/volume)            0.78 mg/dL    
        0.60-1.30        

 

 Serum or plasma urea nitrogen/creatinine mass ratio            26             
NRG        

 

 Serum or plasma creatinine measurement with calculation of estimated 
glomerular filtration rate            >             NRG        

 

 Serum or plasma glucose measurement (mass/volume)            131 mg/dL        
            

 

 Serum or plasma calcium measurement (mass/volume)            9.0 mg/dL        
    8.5-10.1        









 Serum or plasma phosphate measurement (mass/volume) - 10/17/17 05:20         









 Serum or plasma phosphate measurement (mass/volume)            2.5 mg/dL      
      2.3-4.7        









 Magnesium - 10/17/17 05:20         









 Magnesium            2.4 mg/dL            1.8-2.4        









 Arterial blood gas measurement - 10/18/17 04:05         









 Blood pCO2            47 mm[Hg]            35-45        

 

 Blood pO2            62 mm[Hg]            79-93        

 

 Arterial blood bicarbonate measurement (moles/volume)            32 mmol/L    
        23-27        

 

 Arterial blood base excess by calculation            7.6 mmol/L            -2.5
-2.5        

 

 Arterial blood oxygen saturation measurement            93 %            
        

 

 * Inhaled oxygen flow rate            25% BIPAP             NRG        

 

 Arterial blood pH measurement with patient temperature correction            
7.45             7.37-7.43        

 

 Arterial blood carbon dioxide, total measurement (moles/volume)            
33.4 mmol/L            21.0-31.0        

 

 Body site            R RAD             NRG        

 

 Assessment of wrist artery patency prior to arterial puncture            YES-
POS             NRG        

 

 Setting of ventilation mode            NO             NRG        

 

 Measurement of body temperature            97.2             NRG        









 Complete blood count (CBC) with automated white blood cell (WBC) differential 
- 10/18/17 04:30         









 Blood leukocytes automated count (number/volume)            10.9 10*3/uL      
      4.3-11.0        

 

 Blood erythrocytes automated count (number/volume)            4.71 10*6/uL    
        4.35-5.85        

 

 Venous blood hemoglobin measurement (mass/volume)            14.1 g/dL        
    11.5-16.0        

 

 Blood hematocrit (volume fraction)            43 %            35-52        

 

 Automated erythrocyte mean corpuscular volume            92 [foz_us]          
  80-99        

 

 Automated erythrocyte mean corpuscular hemoglobin (mass per erythrocyte)      
      30 pg            25-34        

 

 Automated erythrocyte mean corpuscular hemoglobin concentration measurement (
mass/volume)            33 g/dL            32-36        

 

 Automated erythrocyte distribution width ratio            13.6 %            
10.0-14.5        

 

 Automated blood platelet count (count/volume)            220 10*3/uL          
  130-400        

 

 Automated blood platelet mean volume measurement            9.0 [foz_us]      
      7.4-10.4        

 

 Automated blood neutrophils/100 leukocytes            91 %            42-75   
     

 

 Automated blood lymphocytes/100 leukocytes            4 %            12-44    
    

 

 Blood monocytes/100 leukocytes            5 %            0-12        

 

 Automated blood eosinophils/100 leukocytes            0 %            0-10     
   

 

 Automated blood basophils/100 leukocytes            0 %            0-10        

 

 Blood neutrophils automated count (number/volume)            9.9 10*3         
   1.8-7.8        

 

 Blood lymphocytes automated count (number/volume)            0.5 10*3         
   1.0-4.0        

 

 Blood monocytes automated count (number/volume)            0.5 10*3            
0.0-1.0        

 

 Automated eosinophil count            0.0 10*3/uL            0.0-0.3        

 

 Automated blood basophil count (count/volume)            0.0 10*3/uL          
  0.0-0.1        









 Whole blood basic metabolic panel - 10/18/17 04:30         









 Serum or plasma sodium measurement (moles/volume)            139 mmol/L       
     135-145        

 

 Serum or plasma potassium measurement (moles/volume)            3.9 mmol/L    
        3.6-5.0        

 

 Serum or plasma chloride measurement (moles/volume)            99 mmol/L      
              

 

 Carbon dioxide            30 mmol/L            21-32        

 

 Serum or plasma anion gap determination (moles/volume)            10 mmol/L   
         5-14        

 

 Serum or plasma urea nitrogen measurement (mass/volume)            22 mg/dL   
         7-18        

 

 Serum or plasma creatinine measurement (mass/volume)            0.76 mg/dL    
        0.60-1.30        

 

 Serum or plasma urea nitrogen/creatinine mass ratio            29             
NRG        

 

 Serum or plasma creatinine measurement with calculation of estimated 
glomerular filtration rate            >             NRG        

 

 Serum or plasma glucose measurement (mass/volume)            125 mg/dL        
            

 

 Serum or plasma calcium measurement (mass/volume)            9.2 mg/dL        
    8.5-10.1        









 Serum or plasma phosphate measurement (mass/volume) - 10/18/17 04:30         









 Serum or plasma phosphate measurement (mass/volume)            1.9 mg/dL      
      2.3-4.7        









 Magnesium - 10/18/17 04:30         









 Magnesium            2.5 mg/dL            1.8-2.4        









 Complete blood count (CBC) with automated white blood cell (WBC) differential 
- 10/19/17 05:00         









 Blood leukocytes automated count (number/volume)            13.0 10*3/uL      
      4.3-11.0        

 

 Blood erythrocytes automated count (number/volume)            4.89 10*6/uL    
        4.35-5.85        

 

 Venous blood hemoglobin measurement (mass/volume)            14.4 g/dL        
    11.5-16.0        

 

 Blood hematocrit (volume fraction)            44 %            35-52        

 

 Automated erythrocyte mean corpuscular volume            91 [foz_us]          
  80-99        

 

 Automated erythrocyte mean corpuscular hemoglobin (mass per erythrocyte)      
      29 pg            25-34        

 

 Automated erythrocyte mean corpuscular hemoglobin concentration measurement (
mass/volume)            32 g/dL            32-36        

 

 Automated erythrocyte distribution width ratio            13.8 %            
10.0-14.5        

 

 Automated blood platelet count (count/volume)            246 10*3/uL          
  130-400        

 

 Automated blood platelet mean volume measurement            9.0 [foz_us]      
      7.4-10.4        

 

 Automated blood neutrophils/100 leukocytes            91 %            42-75   
     

 

 Automated blood lymphocytes/100 leukocytes            4 %            12-44    
    

 

 Blood monocytes/100 leukocytes            5 %            0-12        

 

 Automated blood eosinophils/100 leukocytes            0 %            0-10     
   

 

 Automated blood basophils/100 leukocytes            0 %            0-10        

 

 Blood neutrophils automated count (number/volume)            11.7 10*3        
    1.8-7.8        

 

 Blood lymphocytes automated count (number/volume)            0.6 10*3         
   1.0-4.0        

 

 Blood monocytes automated count (number/volume)            0.7 10*3            
0.0-1.0        

 

 Automated eosinophil count            0.0 10*3/uL            0.0-0.3        

 

 Automated blood basophil count (count/volume)            0.0 10*3/uL          
  0.0-0.1        









 Blood manual differential performed detection - 10/19/17 05:00         









 Blood monocytes/100 leukocytes            4 %            NRG        

 

 Manual blood segmented neutrophils/100 leukocytes            93 %            
NRG        

 

 Blood band neutrophils/100 leukocytes            0 %            NRG        

 

 Manual blood lymphocytes/100 leukocytes            3 %            NRG        

 

 Manual eosinophils/100 leukocytes in nose            0 %            NRG        

 

 Manual blood basophils/100 leukocytes            0 %            NRG        

 

 Blood erythrocyte morphology finding identification            NORMAL         
    NRG        









 Whole blood basic metabolic panel - 10/19/17 05:00         









 Serum or plasma sodium measurement (moles/volume)            140 mmol/L       
     135-145        

 

 Serum or plasma potassium measurement (moles/volume)            3.8 mmol/L    
        3.6-5.0        

 

 Serum or plasma chloride measurement (moles/volume)            100 mmol/L     
               

 

 Carbon dioxide            29 mmol/L            21-32        

 

 Serum or plasma anion gap determination (moles/volume)            11 mmol/L   
         5-14        

 

 Serum or plasma urea nitrogen measurement (mass/volume)            22 mg/dL   
         7-18        

 

 Serum or plasma creatinine measurement (mass/volume)            0.75 mg/dL    
        0.60-1.30        

 

 Serum or plasma urea nitrogen/creatinine mass ratio            29             
NRG        

 

 Serum or plasma creatinine measurement with calculation of estimated 
glomerular filtration rate            >             NRG        

 

 Serum or plasma glucose measurement (mass/volume)            144 mg/dL        
            

 

 Serum or plasma calcium measurement (mass/volume)            8.9 mg/dL        
    8.5-10.1        









 Serum or plasma phosphate measurement (mass/volume) - 10/19/17 05:00         









 Serum or plasma phosphate measurement (mass/volume)            2.8 mg/dL      
      2.3-4.7        









 Magnesium - 10/19/17 05:00         









 Magnesium            2.4 mg/dL            1.8-2.4        









 Complete blood count (CBC) with automated white blood cell (WBC) differential 
- 10/20/17 05:50         









 Blood leukocytes automated count (number/volume)            13.5 10*3/uL      
      4.3-11.0        

 

 Blood erythrocytes automated count (number/volume)            4.71 10*6/uL    
        4.35-5.85        

 

 Venous blood hemoglobin measurement (mass/volume)            14.0 g/dL        
    11.5-16.0        

 

 Blood hematocrit (volume fraction)            43 %            35-52        

 

 Automated erythrocyte mean corpuscular volume            91 [foz_us]          
  80-99        

 

 Automated erythrocyte mean corpuscular hemoglobin (mass per erythrocyte)      
      30 pg            25-34        

 

 Automated erythrocyte mean corpuscular hemoglobin concentration measurement (
mass/volume)            33 g/dL            32-36        

 

 Automated erythrocyte distribution width ratio            13.7 %            
10.0-14.5        

 

 Automated blood platelet count (count/volume)            246 10*3/uL          
  130-400        

 

 Automated blood platelet mean volume measurement            9.1 [foz_us]      
      7.4-10.4        

 

 Automated blood neutrophils/100 leukocytes            76 %            42-75   
     

 

 Automated blood lymphocytes/100 leukocytes            14 %            12-44   
     

 

 Blood monocytes/100 leukocytes            9 %            0-12        

 

 Automated blood eosinophils/100 leukocytes            1 %            0-10     
   

 

 Automated blood basophils/100 leukocytes            0 %            0-10        

 

 Blood neutrophils automated count (number/volume)            10.3 10*3        
    1.8-7.8        

 

 Blood lymphocytes automated count (number/volume)            1.9 10*3         
   1.0-4.0        

 

 Blood monocytes automated count (number/volume)            1.2 10*3            
0.0-1.0        

 

 Automated eosinophil count            0.1 10*3/uL            0.0-0.3        

 

 Automated blood basophil count (count/volume)            0.0 10*3/uL          
  0.0-0.1        









 Whole blood basic metabolic panel - 10/20/17 05:50         









 Serum or plasma sodium measurement (moles/volume)            140 mmol/L       
     135-145        

 

 Serum or plasma potassium measurement (moles/volume)            3.4 mmol/L    
        3.6-5.0        

 

 Serum or plasma chloride measurement (moles/volume)            102 mmol/L     
               

 

 Carbon dioxide            30 mmol/L            21-32        

 

 Serum or plasma anion gap determination (moles/volume)            8 mmol/L    
        5-14        

 

 Serum or plasma urea nitrogen measurement (mass/volume)            21 mg/dL   
         7-18        

 

 Serum or plasma creatinine measurement (mass/volume)            0.77 mg/dL    
        0.60-1.30        

 

 Serum or plasma urea nitrogen/creatinine mass ratio            27             
NRG        

 

 Serum or plasma creatinine measurement with calculation of estimated 
glomerular filtration rate            >             NRG        

 

 Serum or plasma glucose measurement (mass/volume)            84 mg/dL         
           

 

 Serum or plasma calcium measurement (mass/volume)            8.6 mg/dL        
    8.5-10.1        









 Serum or plasma phosphate measurement (mass/volume) - 10/20/17 05:50         









 Serum or plasma phosphate measurement (mass/volume)            2.4 mg/dL      
      2.3-4.7        









 Magnesium - 10/20/17 05:50         









 Magnesium            2.4 mg/dL            1.8-2.4        



                                                                               
                                                           



Encounters

      





 ACCT No.            Visit Date/Time            Discharge            Status    
        Pt. Type            Provider            Facility            Loc./Unit  
          Complaint        

 

 [id number]            2018 09:38:00            2018 23:59:59    
        CLS            Outpatient            JOVON JOAQUIN DO            [facility]            PULM            COPD        

 

 [id number]            2017 14:58:00            2017 23:59:59    
        CLS            Outpatient            JOVON JOAQUIN DO            [facility]            RT            COPD        

 

 [id number]            10/13/2017 04:24:00            10/20/2017 12:38:00    
        DIS            Inpatient            NINA HENLEY MD            [facility]            4TH            RESPIRATORY DISTRESS,
COPD EXAC        

 

 [id number]            2017 16:55:00            2017 14:05:00    
        DIS            Inpatient            NINA HENLEY MD            [facility]            4TH            ACUTE COPD        

 

 [id number]            2016 19:06:00            2016 20:57:00    
        DIS            Emergency            DAYAN MAGAÑA DO            [facility]            ER            ANXIETY        

 

 [id number]            2016 12:12:00            2016 14:53:00    
        DIS            Outpatient            HERNANDEZ HERNANDEZ            [facility]            ER            BACK/LEFT WRIST PAIN RASH 
ON ABD        

 

 [id number]            11/10/2016 14:25:00            2016 13:43:00    
        DIS            Inpatient            NINA HENLEY MD            [facility]            4TH            ACUTE COPD EXACERBATION 
       

 

 [id number]            10/03/2016 11:06:00            10/03/2016 23:59:59    
        CLS            Outpatient            NINA HENLEY MD            [facility]            RAD            PRODUCTIVE COUGH, 
SPHENOID TENDERNESS        

 

 [id number]            2016 15:02:00            2016 23:59:59    
        CLS            Outpatient            NINA HENLEY MD            [facility]            RAD            COUGH        

 

 [id number]            2016 08:13:00            2016 23:59:59    
        CLS            Outpatient            NINA HENLEY MD            [facility]            RT            COPD        

 

 [id number]            2015 16:33:00            2015 18:43:00    
        DIS            Emergency            HERNANDEZ HERNANDEZ            [facility]            ER            ARMS AND LEG INJURY      
  

 

 [id number]            10/27/2015 11:25:00            10/27/2015 23:59:59    
        CLS            Outpatient            NINA HENLEY MD            [facility]            RAD            PNUEMONIA        

 

 [id number]            2015 08:43:00            2015 11:55:00    
        DIS            Emergency            SAMUEL DAMIAN DO            [facility]            ER            SOA        

 

 [id number]            2015 11:07:00            2015 23:59:59    
        CLS            Outpatient            NINA HENLEY MD            [facility]            RAD            THORAIC AND LUMBAR SPINE
  PAIN, OSTEOPORSIS        

 

 [id number]            2014 08:50:00            2014 23:59:59    
        CLS            Outpatient            NINA HENLEY MD            [facility]            RAD            SCREENING        

 

 [id number]            2014 08:22:00            2014 23:59:59    
        CLS            Outpatient            LORY SCOTT, NINA JACOBSON            [facility]            RAD            THORACIC PAIN        

 

 [id number]            2013 13:01:00            2013 23:59:59    
        CLS            Outpatient            MAJOR, CHONG ARNP            [facility]            QUICK            THROAT PAIN        

 

 [id number]            2013 11:27:00            2013 12:55:00    
        DIS            Emergency            JAKE SCOTT, MARYCRUZ SWAN            [facility]            ER            ALLERGIC REACTION        

 

 [id number]            2018 14:46:00                         ACT       
     Emergency            CESAR SCOTT, NIALL JACOBSON            [facility]            ER            SOA        

 

 [id number]            2017 08:44:00                                   
   Document Registration                                                       
     

 

 [id number]            2017 08:44:00                                   
   Document Registration                                                       
     

 

 [id number]            2017 08:44:00                                   
   Document Registration                                                       
     

 

 [id number]            2017 08:44:00                                   
   Document Registration                                                       
     

 

 [id number]            10/11/2016 09:23:00                                   
   Document Registration                                                       
     

 

 [id number]            2015 11:19:00                                   
   Document Registration                                                       
     

 

 [id number]            2015 11:19:00                                   
   Document Registration                                                       
     

 

 [id number]            2015 11:19:00                                   
   Document Registration                                                       
     

 

 [id number]            2015 11:19:00                                   
   Document Registration                                                       
     

 

 [id number]            2015 11:19:00                                   
   Document Registration                                                       
     

 

 [id number]            2015 11:19:00                                   
   Document Registration                                                       
     

 

 [id number]            2015 09:43:00                                   
   Document Registration                                                       
     

 

 [id number]            2013 12:30:00                                   
   Document Registration                                                       
     

 

 [id number]            2012 07:31:00                                   
   Document Registration                                                       
     

 

 [id number]            2011 09:34:00                                   
   Document Registration                                                       
     

 

 [id number]            2011 05:02:00                                   
   Document Registration                                                       
     

 

 [id number]            2010 08:00:00                                   
   Document Registration                                                       
     

 

 [id number]            2010 11:00:00                                   
   Document Registration                                                       
     

 

 [id number]            2008 11:18:00                                   
   Document Registration

## 2018-02-08 VITALS — SYSTOLIC BLOOD PRESSURE: 102 MMHG | DIASTOLIC BLOOD PRESSURE: 71 MMHG

## 2018-02-08 VITALS — SYSTOLIC BLOOD PRESSURE: 128 MMHG | DIASTOLIC BLOOD PRESSURE: 86 MMHG

## 2018-02-08 VITALS — DIASTOLIC BLOOD PRESSURE: 84 MMHG | SYSTOLIC BLOOD PRESSURE: 143 MMHG

## 2018-02-08 VITALS — DIASTOLIC BLOOD PRESSURE: 60 MMHG | SYSTOLIC BLOOD PRESSURE: 93 MMHG

## 2018-02-08 VITALS — DIASTOLIC BLOOD PRESSURE: 89 MMHG | SYSTOLIC BLOOD PRESSURE: 130 MMHG

## 2018-02-08 VITALS — DIASTOLIC BLOOD PRESSURE: 84 MMHG | SYSTOLIC BLOOD PRESSURE: 128 MMHG

## 2018-02-08 VITALS — DIASTOLIC BLOOD PRESSURE: 89 MMHG | SYSTOLIC BLOOD PRESSURE: 132 MMHG

## 2018-02-08 VITALS — SYSTOLIC BLOOD PRESSURE: 140 MMHG | DIASTOLIC BLOOD PRESSURE: 100 MMHG

## 2018-02-08 VITALS — DIASTOLIC BLOOD PRESSURE: 70 MMHG | SYSTOLIC BLOOD PRESSURE: 93 MMHG

## 2018-02-08 VITALS — DIASTOLIC BLOOD PRESSURE: 68 MMHG | SYSTOLIC BLOOD PRESSURE: 103 MMHG

## 2018-02-08 VITALS — SYSTOLIC BLOOD PRESSURE: 130 MMHG | DIASTOLIC BLOOD PRESSURE: 84 MMHG

## 2018-02-08 VITALS — DIASTOLIC BLOOD PRESSURE: 72 MMHG | SYSTOLIC BLOOD PRESSURE: 90 MMHG

## 2018-02-08 VITALS — SYSTOLIC BLOOD PRESSURE: 120 MMHG | DIASTOLIC BLOOD PRESSURE: 79 MMHG

## 2018-02-08 VITALS — SYSTOLIC BLOOD PRESSURE: 123 MMHG | DIASTOLIC BLOOD PRESSURE: 87 MMHG

## 2018-02-08 VITALS — SYSTOLIC BLOOD PRESSURE: 109 MMHG | DIASTOLIC BLOOD PRESSURE: 73 MMHG

## 2018-02-08 VITALS — SYSTOLIC BLOOD PRESSURE: 96 MMHG | DIASTOLIC BLOOD PRESSURE: 64 MMHG

## 2018-02-08 VITALS — DIASTOLIC BLOOD PRESSURE: 88 MMHG | SYSTOLIC BLOOD PRESSURE: 126 MMHG

## 2018-02-08 LAB
ALBUMIN SERPL-MCNC: 3.4 GM/DL (ref 3.2–4.5)
ALP SERPL-CCNC: 69 U/L (ref 40–136)
ALT SERPL-CCNC: 15 U/L (ref 0–55)
APTT PPP: YELLOW S
ARTERIAL PATENCY WRIST A: (no result)
BACTERIA #/AREA URNS HPF: (no result) /HPF
BASE EXCESS STD BLDA CALC-SCNC: -1.3 MMOL/L (ref -2.5–2.5)
BASOPHILS # BLD AUTO: 0 10^3/UL (ref 0–0.1)
BASOPHILS NFR BLD AUTO: 0 % (ref 0–10)
BDY SITE: (no result)
BILIRUB SERPL-MCNC: 0.2 MG/DL (ref 0.1–1)
BILIRUB UR QL STRIP: NEGATIVE
BODY TEMPERATURE: 96.6
BUN/CREAT SERPL: 15
CALCIUM SERPL-MCNC: 8.1 MG/DL (ref 8.5–10.1)
CHLORIDE SERPL-SCNC: 106 MMOL/L (ref 98–107)
CO2 BLDA CALC-SCNC: 25.3 MMOL/L (ref 21–31)
CO2 SERPL-SCNC: 22 MMOL/L (ref 21–32)
CREAT SERPL-MCNC: 0.88 MG/DL (ref 0.6–1.3)
EOSINOPHIL # BLD AUTO: 0 10^3/UL (ref 0–0.3)
EOSINOPHIL NFR BLD AUTO: 0 % (ref 0–10)
ERYTHROCYTE [DISTWIDTH] IN BLOOD BY AUTOMATED COUNT: 13.8 % (ref 10–14.5)
FIBRINOGEN PPP-MCNC: CLEAR MG/DL
GFR SERPLBLD BASED ON 1.73 SQ M-ARVRAT: > 60 ML/MIN
GLUCOSE SERPL-MCNC: 145 MG/DL (ref 70–105)
GLUCOSE UR STRIP-MCNC: NEGATIVE MG/DL
HCT VFR BLD CALC: 39 % (ref 35–52)
HGB BLD-MCNC: 12.4 G/DL (ref 11.5–16)
INHALED O2 FLOW RATE: (no result) L/MIN
KETONES UR QL STRIP: NEGATIVE
LEUKOCYTE ESTERASE UR QL STRIP: NEGATIVE
LYMPHOCYTES # BLD AUTO: 0.5 X 10^3 (ref 1–4)
LYMPHOCYTES NFR BLD AUTO: 14 % (ref 12–44)
MAGNESIUM SERPL-MCNC: 2.3 MG/DL (ref 1.8–2.4)
MANUAL DIFFERENTIAL PERFORMED BLD QL: NO
MCH RBC QN AUTO: 30 PG (ref 25–34)
MCHC RBC AUTO-ENTMCNC: 32 G/DL (ref 32–36)
MCV RBC AUTO: 93 FL (ref 80–99)
MONOCYTES # BLD AUTO: 0.1 X 10^3 (ref 0–1)
MONOCYTES NFR BLD AUTO: 3 % (ref 0–12)
NEUTROPHILS # BLD AUTO: 3.2 X 10^3 (ref 1.8–7.8)
NEUTROPHILS NFR BLD AUTO: 83 % (ref 42–75)
NITRITE UR QL STRIP: NEGATIVE
PCO2 BLDA: 45 MMHG (ref 35–45)
PH BLDA: 7.35 [PH] (ref 7.37–7.43)
PH UR STRIP: 6 [PH] (ref 5–9)
PHOSPHATE SERPL-MCNC: 3.4 MG/DL (ref 2.3–4.7)
PLATELET # BLD: 194 10^3/UL (ref 130–400)
PMV BLD AUTO: 9.1 FL (ref 7.4–10.4)
PO2 BLDA: 82 MMHG (ref 79–93)
POTASSIUM SERPL-SCNC: 5 MMOL/L (ref 3.6–5)
PROT SERPL-MCNC: 5.5 GM/DL (ref 6.4–8.2)
PROT UR QL STRIP: NEGATIVE
RBC # BLD AUTO: 4.15 10^6/UL (ref 4.35–5.85)
RBC #/AREA URNS HPF: (no result) /HPF
SAO2 % BLDA FROM PO2: 98 % (ref 94–100)
SODIUM SERPL-SCNC: 137 MMOL/L (ref 135–145)
SP GR UR STRIP: 1.01 (ref 1.02–1.02)
SQUAMOUS #/AREA URNS HPF: (no result) /HPF
UROBILINOGEN UR-MCNC: NORMAL MG/DL
VENTILATION MODE VENT: NO
WBC # BLD AUTO: 3.8 10^3/UL (ref 4.3–11)
WBC #/AREA URNS HPF: (no result) /HPF

## 2018-02-08 RX ADMIN — MORPHINE SULFATE PRN MG: 4 INJECTION, SOLUTION INTRAMUSCULAR; INTRAVENOUS at 16:19

## 2018-02-08 RX ADMIN — LORAZEPAM PRN MG: 1 TABLET ORAL at 23:27

## 2018-02-08 RX ADMIN — MORPHINE SULFATE PRN MG: 4 INJECTION, SOLUTION INTRAMUSCULAR; INTRAVENOUS at 21:52

## 2018-02-08 RX ADMIN — Medication SCH ML: at 14:43

## 2018-02-08 RX ADMIN — IPRATROPIUM BROMIDE AND ALBUTEROL SULFATE SCH ML: .5; 3 SOLUTION RESPIRATORY (INHALATION) at 14:33

## 2018-02-08 RX ADMIN — MORPHINE SULFATE PRN MG: 4 INJECTION, SOLUTION INTRAMUSCULAR; INTRAVENOUS at 05:49

## 2018-02-08 RX ADMIN — SODIUM CHLORIDE SCH MLS/HR: 900 INJECTION, SOLUTION INTRAVENOUS at 09:02

## 2018-02-08 RX ADMIN — METHYLPREDNISOLONE SODIUM SUCCINATE SCH MG: 40 INJECTION, POWDER, FOR SOLUTION INTRAMUSCULAR; INTRAVENOUS at 18:37

## 2018-02-08 RX ADMIN — IPRATROPIUM BROMIDE AND ALBUTEROL SULFATE SCH ML: .5; 3 SOLUTION RESPIRATORY (INHALATION) at 07:13

## 2018-02-08 RX ADMIN — IPRATROPIUM BROMIDE AND ALBUTEROL SULFATE SCH ML: .5; 3 SOLUTION RESPIRATORY (INHALATION) at 08:22

## 2018-02-08 RX ADMIN — ALBUTEROL SULFATE SCH GM: 90 AEROSOL, METERED RESPIRATORY (INHALATION) at 20:30

## 2018-02-08 RX ADMIN — RISPERIDONE SCH MG: 1 TABLET, FILM COATED ORAL at 09:01

## 2018-02-08 RX ADMIN — ALBUTEROL SULFATE SCH GM: 90 AEROSOL, METERED RESPIRATORY (INHALATION) at 18:30

## 2018-02-08 RX ADMIN — SODIUM CHLORIDE AND POTASSIUM CHLORIDE SCH MLS/HR: 4.5; 1.49 INJECTION, SOLUTION INTRAVENOUS at 04:36

## 2018-02-08 RX ADMIN — LORAZEPAM PRN MG: 1 TABLET ORAL at 09:02

## 2018-02-08 RX ADMIN — IPRATROPIUM BROMIDE AND ALBUTEROL SULFATE SCH ML: .5; 3 SOLUTION RESPIRATORY (INHALATION) at 10:49

## 2018-02-08 RX ADMIN — ALBUTEROL SULFATE SCH GM: 90 AEROSOL, METERED RESPIRATORY (INHALATION) at 18:56

## 2018-02-08 RX ADMIN — ALBUTEROL SULFATE SCH GM: 90 AEROSOL, METERED RESPIRATORY (INHALATION) at 22:26

## 2018-02-08 RX ADMIN — SODIUM CHLORIDE SCH MLS/HR: 900 INJECTION, SOLUTION INTRAVENOUS at 06:22

## 2018-02-08 RX ADMIN — LORAZEPAM PRN MG: 1 TABLET ORAL at 15:39

## 2018-02-08 RX ADMIN — GUAIFENESIN AND DEXTROMETHORPHAN PRN ML: 100; 10 SYRUP ORAL at 19:27

## 2018-02-08 RX ADMIN — RISPERIDONE SCH MG: 1 TABLET, FILM COATED ORAL at 21:53

## 2018-02-08 RX ADMIN — MORPHINE SULFATE PRN MG: 4 INJECTION, SOLUTION INTRAMUSCULAR; INTRAVENOUS at 12:16

## 2018-02-08 RX ADMIN — METHYLPREDNISOLONE SODIUM SUCCINATE SCH MG: 40 INJECTION, POWDER, FOR SOLUTION INTRAMUSCULAR; INTRAVENOUS at 06:22

## 2018-02-08 RX ADMIN — METHYLPREDNISOLONE SODIUM SUCCINATE SCH MG: 40 INJECTION, POWDER, FOR SOLUTION INTRAMUSCULAR; INTRAVENOUS at 11:15

## 2018-02-08 RX ADMIN — TRAZODONE HYDROCHLORIDE SCH MG: 50 TABLET ORAL at 21:53

## 2018-02-08 RX ADMIN — GUAIFENESIN AND DEXTROMETHORPHAN PRN ML: 100; 10 SYRUP ORAL at 12:10

## 2018-02-08 RX ADMIN — Medication SCH ML: at 06:14

## 2018-02-08 RX ADMIN — AMITRIPTYLINE HYDROCHLORIDE SCH MG: 25 TABLET, FILM COATED ORAL at 11:15

## 2018-02-08 RX ADMIN — IPRATROPIUM BROMIDE AND ALBUTEROL SULFATE SCH ML: .5; 3 SOLUTION RESPIRATORY (INHALATION) at 02:53

## 2018-02-08 RX ADMIN — Medication SCH ML: at 20:29

## 2018-02-08 RX ADMIN — SODIUM CHLORIDE SCH MLS/HR: 900 INJECTION, SOLUTION INTRAVENOUS at 15:39

## 2018-02-08 NOTE — PULMONARY CONSULTATION
History of Present Illness


History of Present Illness


Date of Consultation


18


 05:34


Time Seen by Provider:  05:34


Date of Admission





History of Present Illness


63YO with hx of severe oxygen dependent severe COPD presented to ED secondary 

worsening SOB and NPC. She was found to have a fever of 101 in the ED. She was 

recently treated as out patient with abx. She did have influenza vaccination. 

She has had similar previous episodes. I am consulted for pulmonary/ICU 

management.





Allergies and Home Medications


Allergies


Coded Allergies:  


     fluticasone (Unverified  Allergy, Mild, 12/3/09)


     salmeterol (Unverified  Allergy, Mild, 12/3/09)





Home Medications


Albuterol Sulfate 18 Gm Hfa.aer.ad, 2 PUFF IH Q4H PRN for SHORTNESS OF BREATH, (

Reported)


Albuterol Sulfate 2.5 Mg/0.5 Ml Vial.neb, 2.5 MG IH Q4H PRN for SHORTNESS OF 

BREATH, (Reported)


Amitriptyline HCl 25 Mg Tablet, 25 MG PO DAILY, (Reported)


Aspirin/Acetaminophen/Caffeine 1 Each Tablet, 1 TAB PO BID PRN for MIGRAINE, (

Reported)


Benzonatate 200 Mg Capsule, 200 MG PO Q8H PRN for COUGH, (Reported)


Citalopram Hydrobromide 10 Mg Tablet, 10 MG PO DAILY, (Reported)


Lorazepam 2 Mg Tablet, 1 MG PO Q6H PRN for ANXIETY, (Reported)


   TAKES 1/2 (2MG) TABLET 


Omeprazole 40 Mg Capsule.dr, 40 MG PO DAILY PRN for HEARTBURN, (Reported)


Prednisone 10 Mg Tab, 10 MG PO DAILY, (Reported)


Tiotropium Bromide 1 Inh Aerp, 1 CAP IH DAILY, (Reported)


Trazodone HCl 50 Mg Tablet, 50 MG PO HS, (Reported)





Past Medical-Social-Family Hx


Patient Social History


Alcohol Use:  Denies Use


Number of Drinks Today:  AA


Recreational Drug Use:  No


Smoking Status:  Current Everyday Smoker


Type Used:  Cigarettes


Former Smoker, Quit:  2016


2nd Hand Smoke Exposure:  Yes


Recent Foreign Travel:  No


Contact w/Someone Who Travel:  No


Recent Infectious Disease Expo:  No


Recent Hopitalizations:  Yes (2017)


Physical Abuse:  No


Sexual Abuse:  No


Mistreated:  No


Fear:  No





Immunizations Up To Date


Date of Influenza Vaccine:  2018





Seasonal Allergies


Seasonal Allergies:  No





Surgeries


History of Surgeries:  Yes (TUBAL LIG)


Surgeries:  Tubal Ligation





Respiratory


History of Respiratory Disorde:  Yes (O2 DEPENDENT)


Respiratory Disorders:  COPD


Currently Using CPAP:  No


Currently Using BIPAP:  No





Cardiovascular


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





Neurological


History of Neurological Disord:  No





Reproductive System


Hx Reproductive Disorders:  No


Sexually Transmitted Disease:  No


HIV/AIDS:  No


Female Reproductive Disorders:  Denies


GYN History:  Tubal Ligation, Menopausal





Genitourinary


History of Genitourinary Disor:  No





Gastrointestinal


History of Gastrointestinal Di:  Yes


Gastrointestinal Disorders:  Gastroesophageal Reflux





Musculoskeletal


History of Musculoskeletal Dis:  Yes


Musculoskeletal Disorders:  Degenerate Disk Disease, Arthritis, Chronic Back 

Pain





Endocrine


History of Endocrine Disorders:  No





HEENT


History of HEENT Disorders:  Yes


HEENT Disorders:  Cataract


Loss of Vision:  Denies


Hearing Impairment:  Denies





Cancer


History of Cancer:  No





Psychosocial


History of Psychiatric Problem:  Yes


Behavioral Health Disorders:  Sleep Difficulties, Anxiety, Depression


Suicide Risk Score:  0





Integumentary


History of Skin or Integumenta:  Yes (SHINGLES 2016)





Blood Transfusions


History of Blood Disorders:  No


Adverse Reaction to a Blood Tr:  No





Family Medical History


Family Medial History:  


Neoplasm


  19 FATHER


Respiratory disorder


  19 FATHER





Review of Systems


Time Seen by Provider:  05:55


Constitutional:  Fever, Chills, Sweats, Weakness, Malaise


Eyes:  No: Pain, Vision change, Conjunctivae inflammation, Eyelid inflammation, 

Other, Redness


ENT:  Nose congestion, No: Ear pain, Ear discharge, Nose pain, Nose discharge, 

Mouth pain, Mouth swelling, Throat pain, Throat swelling, Other


Respiratory:  Shortness of breath, SOB with excertion, Wheezing, Sputum


Cardiovascular:  Palpitations, Paroxysmal Noc. Dyspnea, Lt Headedness


Neurological:  Weakness, Confusion





Exam


Exam





Vital Signs








  Date Time  Temp Pulse Resp B/P (MAP) Pulse Ox O2 Delivery O2 Flow Rate FiO2


 


18 04:26  78 22  98  30.00 


 


18 04:00 98.8       


 


18 04:00     97 NIV Bilevel  30


 


18 02:53  71 24  96  30.00 


 


18 01:00  77      


 


18 00:00     96 NIV Bilevel  30


 


18 00:00 97.7       


 


18 23:12  78 23  98  30.00 


 


18 21:23      NIV Bilevel 30.00 


 


18 21:00  91 24 96/65 (75) 97 NIV Bilevel 25.00 


 


18 20:42  93 25  96  30.00 


 


18 20:05 99.5       


 


18 20:00     97 NIV Bilevel  30


 


18 20:00  92 24 109/72 (84) 96 NIV Bilevel 25.00 


 


18 19:56  94   97   


 


18 19:46 97.7      25.00 


 


18 19:40 97.4 94 23 124/93 97 NIV/Bilevel 30.00 


 


18 19:00  94      


 


18 19:00  93 26 127/81 (96) 98 NIV Bilevel 30.00 


 


18 18:30  92 27  96  30.00 





        


 


18 18:02  98      


 


18 18:00  100 32 100/74 (83) 97 NIV Bilevel 30.00 


 


18 17:45      NIV Bilevel 30.00 


 


18 17:42  103 31 97/83 (88) 97 NIV Bilevel 30.00 


 


18 17:30 99.8 101 27 123/75 97 NIV/Bilevel 30.00 


 


18 17:00 100.0 110 30  98 Nasal Cannula 30.00 


 


18 15:18  118 36  100  30.00 


 


18 15:16 100.0 116 20 135/89 (104) 96 Nasal Cannula 3.00 


 


18 15:00     96 Nasal Cannula 3.00 














I & O 


 


 18





 07:00


 


Intake Total 1510 ml


 


Output Total 302 ml


 


Balance 1208 ml








General Appearance:  Moderate Distress


HEENT:  Moist Mucous Membranes


Neck:  Full Range of Motion, Supple


Respiratory:  Accessory Muscle Use, Respiratory Distress (is mild), Other (

distant)


Cardiovascular:  Regular Rate, Rhythm


Capillary Refill:  Less Than 3 Seconds


Gastrointestinal:  normal bowel sounds, non tender, soft


Extremity:  Normal Capillary Refill, Normal Inspection, Non Tender, No Calf 

Tenderness


Neurologic/Psychiatric:  Alert, Oriented x3


Skin:  Normal Color, Warm/Dry


Lymphatic:  No Adenopathy





Results


Lab


Laboratory Tests


18 15:04








18 04:25











Assessment/Plan


Assessment/Plan


Acute on Chronic respiratory failure


   -Noninvasive ventilation


COPDAE/AsthmaAE 


   -SVNS increase to Q2 


   -Solumedrol 40 IV Q 6 


Panic attacks/psychosis


   -Risperdal BID  0.5mg BID 


Anxiety 


   -Continue home Ativan, morphine 





255





Clinical Quality Measures


DVT/VTE Risk/Contraindication:


Risk Factor Score Per Nursin


RFS Level Per Nursing on Admit:  4+=Very High











JOVON JOAQUIN DO 2018 05:39

## 2018-02-08 NOTE — PROGRESS NOTE (SOAP)
Subjective


Date Seen by Provider:  2018


Time Seen by Provider:  07:30


Subjective/Events-last exam


Patient is breathing more comfortably off the BiPAP.  She did have 1 dose of 

morphine which seemed to relax her. She doesn't appear to have as much air 

hunger





Objective


Exam





Vital Signs








  Date Time  Temp Pulse Resp B/P (MAP) Pulse Ox O2 Delivery O2 Flow Rate FiO2


 


18 07:13     98 Nasal Cannula 3.00 


 


18 04:26  78 22  98  30.00 


 


18 04:00 98.8       


 


18 04:00     97 NIV Bilevel  30


 


18 02:53  71 24  96  30.00 


 


18 01:00  77      


 


18 00:00     96 NIV Bilevel  30


 


18 00:00 97.7       


 


18 23:12  78 23  98  30.00 


 


18 21:23      NIV Bilevel 30.00 


 


18 21:00  91 24 96/65 (75) 97 NIV Bilevel 25.00 


 


18 20:42  93 25  96  30.00 


 


18 20:05 99.5       


 


18 20:00     97 NIV Bilevel  30


 


18 20:00  92 24 109/72 (84) 96 NIV Bilevel 25.00 


 


18 19:56  94   97   


 


18 19:46 97.7      25.00 


 


18 19:40 97.4 94 23 124/93 97 NIV/Bilevel 30.00 


 


18 19:00  94      


 


18 19:00  93 26 127/81 (96) 98 NIV Bilevel 30.00 


 


18 18:30  92 27  96  30.00 





        


 


18 18:02  98      


 


18 18:00  100 32 100/74 (83) 97 NIV Bilevel 30.00 


 


18 17:45      NIV Bilevel 30.00 


 


18 17:42  103 31 97/83 (88) 97 NIV Bilevel 30.00 


 


18 17:30 99.8 101 27 123/75 97 NIV/Bilevel 30.00 


 


18 17:00 100.0 110 30  98 Nasal Cannula 30.00 


 


18 15:18  118 36  100  30.00 


 


18 15:16 100.0 116 20 135/89 (104) 96 Nasal Cannula 3.00 


 


18 15:00     96 Nasal Cannula 3.00 














I & O 


 


 18





 07:00


 


Intake Total 1760 ml


 


Output Total 1200 ml


 


Balance 560 ml





Capillary Refill : Less Than 3 Seconds


General Appearance:  No Apparent Distress


Neck:  Supple


Respiratory:  Decreased Breath Sounds


Cardiovascular:  Regular Rate, Rhythm


Gastrointestinal:  soft


Extremity:  No Pedal Edema





Results


Lab


Laboratory Tests


18 15:04: 


White Blood Count 7.0, Red Blood Count 4.61, Hemoglobin 13.9, Hematocrit 43, 

Mean Corpuscular Volume 92, Mean Corpuscular Hemoglobin 30, Mean Corpuscular 

Hemoglobin Concent 33, Red Cell Distribution Width 14.1, Platelet Count 201, 

Mean Platelet Volume 9.0, Neutrophils (%) (Auto) 78H, Lymphocytes (%) (Auto) 13

, Monocytes (%) (Auto) 9, Eosinophils (%) (Auto) 0, Basophils (%) (Auto) 0, 

Neutrophils # (Auto) 5.5, Lymphocytes # (Auto) 0.9L, Monocytes # (Auto) 0.6, 

Eosinophils # (Auto) 0.0, Basophils # (Auto) 0.0, Prothrombin Time 12.1L, INR 

Comment 0.9, Activated Partial Thromboplast Time 29, Sodium Level 134L, 

Potassium Level 4.2, Chloride Level 98, Carbon Dioxide Level 24, Anion Gap 12, 

Blood Urea Nitrogen 9, Creatinine 0.96, Estimat Glomerular Filtration Rate 59, 

BUN/Creatinine Ratio 9, Glucose Level 85, Lactic Acid Level 2.03*H, Calcium 

Level 8.7, Magnesium Level 1.9, Total Bilirubin 0.3, Aspartate Amino Transf (AST

/SGOT) 29, Alanine Aminotransferase (ALT/SGPT) 16, Alkaline Phosphatase 86, 

Troponin I < 0.30, C-Reactive Protein High Sensitivity 3.88H, B-Type 

Natriuretic Peptide < 10.0, Total Protein 6.8, Albumin 4.1


18 15:06: 


Blood Gas Puncture Site RT RAD, Blood Gas Patient Temperature 100.0, Arterial 

Blood pH 7.36L, Arterial Blood Partial Pressure CO2 52H, Arterial Blood Partial 

Pressure O2 43L, Arterial Blood HCO3 28H, Arterial Blood Total CO2 29.7, 

Arterial Blood Oxygen Saturation 73L, Arterial Blood Base Excess 3.3H, Hermes 

Test YES-POS, Blood Gas Ventilator Setting NO, Blood Gas Inspired Oxygen 3L


18 17:23: Lactic Acid Level 1.09


18 18:45: 


Blood Gas Puncture Site LT RAD, Blood Gas Patient Temperature 98.4, Arterial 

Blood pH 7.36L, Arterial Blood Partial Pressure CO2 47H, Arterial Blood Partial 

Pressure O2 81, Arterial Blood HCO3 26, Arterial Blood Total CO2 27.3, Arterial 

Blood Oxygen Saturation 97, Arterial Blood Base Excess 0.9, Hermes Test YES-POS, 

Blood Gas Ventilator Setting NO, Blood Gas Inspired Oxygen 30%


18 04:25: 


White Blood Count 3.8L, Red Blood Count 4.15L, Hemoglobin 12.4, Hematocrit 39, 

Mean Corpuscular Volume 93, Mean Corpuscular Hemoglobin 30, Mean Corpuscular 

Hemoglobin Concent 32, Red Cell Distribution Width 13.8, Platelet Count 194, 

Mean Platelet Volume 9.1, Neutrophils (%) (Auto) 83H, Lymphocytes (%) (Auto) 14

, Monocytes (%) (Auto) 3, Eosinophils (%) (Auto) 0, Basophils (%) (Auto) 0, 

Neutrophils # (Auto) 3.2, Lymphocytes # (Auto) 0.5L, Monocytes # (Auto) 0.1, 

Eosinophils # (Auto) 0.0, Basophils # (Auto) 0.0, Sodium Level 137, Potassium 

Level 5.0, Chloride Level 106, Carbon Dioxide Level 22, Anion Gap 9, Blood Urea 

Nitrogen 13, Creatinine 0.88, Estimat Glomerular Filtration Rate > 60, BUN/

Creatinine Ratio 15, Glucose Level 145H, Calcium Level 8.1L, Phosphorus Level 

3.4, Magnesium Level 2.3, Total Bilirubin 0.2, Aspartate Amino Transf (AST/SGOT

) 26, Alanine Aminotransferase (ALT/SGPT) 15, Alkaline Phosphatase 69, Total 

Protein 5.5L, Albumin 3.4


18 05:20: 


Blood Gas Puncture Site L RAD, Blood Gas Patient Temperature 96.6, Arterial 

Blood pH 7.35L, Arterial Blood Partial Pressure CO2 45, Arterial Blood Partial 

Pressure O2 82, Arterial Blood HCO3 24, Arterial Blood Total CO2 25.3, Arterial 

Blood Oxygen Saturation 98, Arterial Blood Base Excess -1.3, Hermes Test YES-POS

, Blood Gas Ventilator Setting NO, Blood Gas Inspired Oxygen 30% BIPAP


18 06:30: 


Urine Color YELLOW, Urine Clarity CLEAR, Urine pH 6, Urine Specific Gravity 

1.010L, Urine Protein NEGATIVE, Urine Glucose (UA) NEGATIVE, Urine Ketones 

NEGATIVE, Urine Nitrite NEGATIVE, Urine Bilirubin NEGATIVE, Urine Urobilinogen 

NORMAL, Urine Leukocyte Esterase NEGATIVE, Urine RBC (Auto) NEGATIVE, Urine RBC 

NONE, Urine WBC RARE, Urine Squamous Epithelial Cells RARE, Urine Crystals NONE

, Urine Bacteria TRACE, Urine Casts NONE, Urine Mucus SMALLH, Urine Culture 

Indicated NO





Microbiology


18 Influenza Types A,B Antigen (SOLIS) - Final, Complete





Assessment/Plan


Assessment/Plan


Assess & Plan/Chief Complaint


1. COPD exacerbation


-patient admitted to intensive care  unit


-She is currently on BiPAP


-Pulmonary consultation





-solu-Medrol decreased to 40 mg intravenous every 6 hours.


-patient is currently on nasal cannula oxygen


-possible move to medical floor this afternoon





2.  Acute respiratory distress secondary to #1





3.  Hypoxemia


-support with oxygen currently on BiPAP


-wean to nasal cannula as she improves





-ontinue with nasal cannula





4.  Bronchitis vs early pneumonia


-she will be started on Rocephin 1 g daily as well as Zithromax





-day number 2 Rocephin and Zithromax





Clinical Quality Measures


DVT/VTE Risk/Contraindication:


Risk Factor Score Per Nursin


RFS Level Per Nursing on Admit:  4+=Very High











NINA HENLEY MD 2018 07:47

## 2018-02-08 NOTE — PHYSICIAN QUERY CLARIFICATION
PQ-Conflicting Diagnosis


Admission/Discharge


Admission Date: Feb 7, 2018 at 16:21 


Discharge Date:








The medical record reflects the following clinical scenario:





History/Risk Factors:


COPD acute exacerbation


Asthma with acute exacerbation





Clinical Findings:


2/7 blood gases-Ph7.36, pCO2 52, pO2 43, Sats 73%. 2/7 resp 36, pulse 118. 

Respiratory distress, accessory muscle use and wheezing documented on ED record.





Treatment:


IV Solu Medrol, Albuterol/Ipratropium inhalation therapy. Nasal cannula 3L. NIV/

Bilevel 30.00%FIO2.





Question:


Do you agree with the impression of Acute and chronic respiratory failure per 

Dr. Jackman? Please document a response below.





PHYSICIAN RESPONSE


Do you agree w/Consulting Dx?:  Yes


Explanation of clincal finding


Acute on chronic respiratory failure is correct as well.








In responding to this query, please exercise your independent professional 

judgment.  The purpose of this communication is to more accurately reflect the 

complexity of your patients condition. The fact that a question is asked does 

not imply that any particular answer is desired or expected.  





Thank you for your timely response to this clarification.      


   


Requestors name: Kath Clifton [facility]   





Phone # ext [phone] or [phone]








THIS PHYSICIAN QUERY FORM IS A PERMANENT PART OF THE MEDICAL RECORD











KATH CLIFTON Feb 8, 2018 10:30


NINA HENLEY MD Feb 9, 2018 07:20

## 2018-02-08 NOTE — DIAGNOSTIC IMAGING REPORT
INDICATION: Dyspnea.



TECHNIQUE: Single view chest 4:19 AM.



CORRELATION STUDY: 07/20/2018



FINDINGS: 

The heart size, mediastinal configuration and pulmonary

vascularity are within normal limits.  The lung fields overall

are somewhat hyperinflated. Minimal atelectasis is suggested

about the left lung base. No focal infiltrate.



IMPRESSION: 

1. Chronic changes of the lung parenchyma with perhaps minimal

atelectasis of the left lung base. Otherwise, relatively stable

chest demonstrates no significant interval change.     



Dictated by: 



  Dictated on workstation # [id number]

## 2018-02-09 VITALS — SYSTOLIC BLOOD PRESSURE: 141 MMHG | DIASTOLIC BLOOD PRESSURE: 90 MMHG

## 2018-02-09 VITALS — SYSTOLIC BLOOD PRESSURE: 145 MMHG | DIASTOLIC BLOOD PRESSURE: 90 MMHG

## 2018-02-09 VITALS — SYSTOLIC BLOOD PRESSURE: 156 MMHG | DIASTOLIC BLOOD PRESSURE: 86 MMHG

## 2018-02-09 VITALS — SYSTOLIC BLOOD PRESSURE: 136 MMHG | DIASTOLIC BLOOD PRESSURE: 95 MMHG

## 2018-02-09 VITALS — DIASTOLIC BLOOD PRESSURE: 91 MMHG | SYSTOLIC BLOOD PRESSURE: 143 MMHG

## 2018-02-09 VITALS — SYSTOLIC BLOOD PRESSURE: 161 MMHG | DIASTOLIC BLOOD PRESSURE: 96 MMHG

## 2018-02-09 VITALS — DIASTOLIC BLOOD PRESSURE: 88 MMHG | SYSTOLIC BLOOD PRESSURE: 159 MMHG

## 2018-02-09 VITALS — DIASTOLIC BLOOD PRESSURE: 87 MMHG | SYSTOLIC BLOOD PRESSURE: 140 MMHG

## 2018-02-09 VITALS — SYSTOLIC BLOOD PRESSURE: 93 MMHG | DIASTOLIC BLOOD PRESSURE: 71 MMHG

## 2018-02-09 VITALS — DIASTOLIC BLOOD PRESSURE: 93 MMHG | SYSTOLIC BLOOD PRESSURE: 122 MMHG

## 2018-02-09 VITALS — DIASTOLIC BLOOD PRESSURE: 83 MMHG | SYSTOLIC BLOOD PRESSURE: 100 MMHG

## 2018-02-09 VITALS — DIASTOLIC BLOOD PRESSURE: 87 MMHG | SYSTOLIC BLOOD PRESSURE: 136 MMHG

## 2018-02-09 VITALS — SYSTOLIC BLOOD PRESSURE: 128 MMHG | DIASTOLIC BLOOD PRESSURE: 92 MMHG

## 2018-02-09 VITALS — SYSTOLIC BLOOD PRESSURE: 160 MMHG | DIASTOLIC BLOOD PRESSURE: 87 MMHG

## 2018-02-09 VITALS — DIASTOLIC BLOOD PRESSURE: 98 MMHG | SYSTOLIC BLOOD PRESSURE: 123 MMHG

## 2018-02-09 VITALS — DIASTOLIC BLOOD PRESSURE: 83 MMHG | SYSTOLIC BLOOD PRESSURE: 159 MMHG

## 2018-02-09 VITALS — DIASTOLIC BLOOD PRESSURE: 92 MMHG | SYSTOLIC BLOOD PRESSURE: 129 MMHG

## 2018-02-09 VITALS — DIASTOLIC BLOOD PRESSURE: 92 MMHG | SYSTOLIC BLOOD PRESSURE: 160 MMHG

## 2018-02-09 VITALS — SYSTOLIC BLOOD PRESSURE: 142 MMHG | DIASTOLIC BLOOD PRESSURE: 93 MMHG

## 2018-02-09 VITALS — DIASTOLIC BLOOD PRESSURE: 102 MMHG | SYSTOLIC BLOOD PRESSURE: 145 MMHG

## 2018-02-09 VITALS — DIASTOLIC BLOOD PRESSURE: 95 MMHG | SYSTOLIC BLOOD PRESSURE: 148 MMHG

## 2018-02-09 LAB
BASOPHILS # BLD AUTO: 0 10^3/UL (ref 0–0.1)
BASOPHILS NFR BLD AUTO: 0 % (ref 0–10)
BUN/CREAT SERPL: 15
CALCIUM SERPL-MCNC: 8.5 MG/DL (ref 8.5–10.1)
CHLORIDE SERPL-SCNC: 104 MMOL/L (ref 98–107)
CO2 SERPL-SCNC: 25 MMOL/L (ref 21–32)
CREAT SERPL-MCNC: 0.8 MG/DL (ref 0.6–1.3)
EOSINOPHIL # BLD AUTO: 0 10^3/UL (ref 0–0.3)
EOSINOPHIL NFR BLD AUTO: 0 % (ref 0–10)
ERYTHROCYTE [DISTWIDTH] IN BLOOD BY AUTOMATED COUNT: 13.9 % (ref 10–14.5)
GFR SERPLBLD BASED ON 1.73 SQ M-ARVRAT: > 60 ML/MIN
GLUCOSE SERPL-MCNC: 125 MG/DL (ref 70–105)
HCT VFR BLD CALC: 39 % (ref 35–52)
HGB BLD-MCNC: 12.9 G/DL (ref 11.5–16)
LYMPHOCYTES # BLD AUTO: 0.4 X 10^3 (ref 1–4)
LYMPHOCYTES NFR BLD AUTO: 4 % (ref 12–44)
MAGNESIUM SERPL-MCNC: 2.4 MG/DL (ref 1.8–2.4)
MANUAL DIFFERENTIAL PERFORMED BLD QL: NO
MCH RBC QN AUTO: 31 PG (ref 25–34)
MCHC RBC AUTO-ENTMCNC: 33 G/DL (ref 32–36)
MCV RBC AUTO: 93 FL (ref 80–99)
MONOCYTES # BLD AUTO: 0.3 X 10^3 (ref 0–1)
MONOCYTES NFR BLD AUTO: 4 % (ref 0–12)
NEUTROPHILS # BLD AUTO: 8.5 X 10^3 (ref 1.8–7.8)
NEUTROPHILS NFR BLD AUTO: 92 % (ref 42–75)
PHOSPHATE SERPL-MCNC: 3.1 MG/DL (ref 2.3–4.7)
PLATELET # BLD: 175 10^3/UL (ref 130–400)
PMV BLD AUTO: 9.1 FL (ref 7.4–10.4)
POTASSIUM SERPL-SCNC: 4.1 MMOL/L (ref 3.6–5)
RBC # BLD AUTO: 4.2 10^6/UL (ref 4.35–5.85)
SODIUM SERPL-SCNC: 141 MMOL/L (ref 135–145)
WBC # BLD AUTO: 9.2 10^3/UL (ref 4.3–11)

## 2018-02-09 RX ADMIN — LORAZEPAM PRN MG: 1 TABLET ORAL at 15:08

## 2018-02-09 RX ADMIN — SODIUM CHLORIDE SCH MLS/HR: 900 INJECTION, SOLUTION INTRAVENOUS at 11:54

## 2018-02-09 RX ADMIN — BENZONATATE PRN MG: 100 CAPSULE ORAL at 15:08

## 2018-02-09 RX ADMIN — AMITRIPTYLINE HYDROCHLORIDE SCH MG: 25 TABLET, FILM COATED ORAL at 08:09

## 2018-02-09 RX ADMIN — RISPERIDONE SCH MG: 1 TABLET, FILM COATED ORAL at 08:09

## 2018-02-09 RX ADMIN — Medication SCH ML: at 22:01

## 2018-02-09 RX ADMIN — TRAZODONE HYDROCHLORIDE SCH MG: 50 TABLET ORAL at 20:34

## 2018-02-09 RX ADMIN — METHYLPREDNISOLONE SODIUM SUCCINATE SCH MG: 40 INJECTION, POWDER, FOR SOLUTION INTRAMUSCULAR; INTRAVENOUS at 05:49

## 2018-02-09 RX ADMIN — UMECLIDINIUM SCH INH: 62.5 AEROSOL, POWDER ORAL at 07:39

## 2018-02-09 RX ADMIN — ALBUTEROL SULFATE SCH GM: 90 AEROSOL, METERED RESPIRATORY (INHALATION) at 15:46

## 2018-02-09 RX ADMIN — AZITHROMYCIN SCH MG: 250 TABLET, FILM COATED ORAL at 08:09

## 2018-02-09 RX ADMIN — ALBUTEROL SULFATE SCH GM: 90 AEROSOL, METERED RESPIRATORY (INHALATION) at 03:43

## 2018-02-09 RX ADMIN — ALBUTEROL SULFATE SCH GM: 90 AEROSOL, METERED RESPIRATORY (INHALATION) at 18:43

## 2018-02-09 RX ADMIN — LORAZEPAM PRN MG: 1 TABLET ORAL at 16:46

## 2018-02-09 RX ADMIN — ALBUTEROL SULFATE SCH GM: 90 AEROSOL, METERED RESPIRATORY (INHALATION) at 00:00

## 2018-02-09 RX ADMIN — ALBUTEROL SULFATE SCH GM: 90 AEROSOL, METERED RESPIRATORY (INHALATION) at 20:37

## 2018-02-09 RX ADMIN — MORPHINE SULFATE PRN MG: 4 INJECTION, SOLUTION INTRAMUSCULAR; INTRAVENOUS at 04:31

## 2018-02-09 RX ADMIN — ALBUTEROL SULFATE SCH GM: 90 AEROSOL, METERED RESPIRATORY (INHALATION) at 07:42

## 2018-02-09 RX ADMIN — METHYLPREDNISOLONE SODIUM SUCCINATE SCH MG: 40 INJECTION, POWDER, FOR SOLUTION INTRAMUSCULAR; INTRAVENOUS at 23:49

## 2018-02-09 RX ADMIN — MORPHINE SULFATE PRN MG: 4 INJECTION, SOLUTION INTRAMUSCULAR; INTRAVENOUS at 20:34

## 2018-02-09 RX ADMIN — RISPERIDONE SCH MG: 1 TABLET, FILM COATED ORAL at 20:34

## 2018-02-09 RX ADMIN — METHYLPREDNISOLONE SODIUM SUCCINATE SCH MG: 40 INJECTION, POWDER, FOR SOLUTION INTRAMUSCULAR; INTRAVENOUS at 00:52

## 2018-02-09 RX ADMIN — ALBUTEROL SULFATE SCH GM: 90 AEROSOL, METERED RESPIRATORY (INHALATION) at 10:06

## 2018-02-09 RX ADMIN — ALBUTEROL SULFATE SCH GM: 90 AEROSOL, METERED RESPIRATORY (INHALATION) at 22:54

## 2018-02-09 RX ADMIN — ALBUTEROL SULFATE SCH GM: 90 AEROSOL, METERED RESPIRATORY (INHALATION) at 00:59

## 2018-02-09 RX ADMIN — ALBUTEROL SULFATE SCH GM: 90 AEROSOL, METERED RESPIRATORY (INHALATION) at 13:20

## 2018-02-09 RX ADMIN — METHYLPREDNISOLONE SODIUM SUCCINATE SCH MG: 40 INJECTION, POWDER, FOR SOLUTION INTRAMUSCULAR; INTRAVENOUS at 11:54

## 2018-02-09 RX ADMIN — Medication SCH ML: at 05:48

## 2018-02-09 RX ADMIN — MORPHINE SULFATE PRN MG: 4 INJECTION, SOLUTION INTRAMUSCULAR; INTRAVENOUS at 15:08

## 2018-02-09 RX ADMIN — Medication SCH ML: at 11:54

## 2018-02-09 RX ADMIN — METHYLPREDNISOLONE SODIUM SUCCINATE SCH MG: 40 INJECTION, POWDER, FOR SOLUTION INTRAMUSCULAR; INTRAVENOUS at 18:10

## 2018-02-09 NOTE — DIAGNOSTIC IMAGING REPORT
Indication: Shortness of breath



Portable chest 5:19 AM



Heart size and pulmonary vascular normal. Lungs are clear. There

are no effusions or pneumothoraces.



Impression: Negative chest. No change from previous day.



Dictated by: 



  Dictated on workstation # [id number]

## 2018-02-09 NOTE — PROGRESS NOTE (SOAP)
Subjective


Date Seen by Provider:  2018


Time Seen by Provider:  07:40


Subjective/Events-last exam


Patient on bipap for 2 hours during early am.  Still having slight SOB.





Objective


Exam





Vital Signs








  Date Time  Temp Pulse Resp B/P (MAP) Pulse Ox O2 Delivery O2 Flow Rate FiO2


 


18 07:40     98 Nasal Cannula 3.00 


 


18 07:00  80      


 


18 06:00  82 17 122/93 (103) 100 Nasal Cannula 5.00 


 


18 05:00  86 16 122/93 (103) 100 Nasal Cannula 5.00 


 


18 04:00     98 Nasal Cannula 5.00 


 


18 04:00  88 18 136/87 (103) 99 Nasal Cannula 5.00 


 


18 03:00  90 14 140/87 (104) 100 Nasal Cannula 5.00 


 


18 02:00  90 14 136/95 (109) 100 Nasal Cannula 5.00 


 


18 01:00  101 36 156/86 (109) 95 Nasal Cannula 5.00 


 


18 01:00  101      


 


18 00:59     92 Nasal Cannula 4.00 


 


18 00:00     94 Nasal Cannula 4.00 


 


18 00:00     96 Nasal Cannula 5.00 


 


18 00:00  93 17 128/92 (104) 100 Nasal Cannula 5.00 


 


18 23:00  79 19 130/84 (99) 97 NIV Bilevel 30.00 





        


 


18 22:00  90 20  97  30.00 


 


18 22:00  90 22 133/89 (104) 98 NIV Bilevel 30.00 





        


 


18 21:00  93 28 140/100 (113) 98 Nasal Cannula 3.00 


 


18 20:00  95 20 130/89 (103) 92 Nasal Cannula 3.00 


 


18 20:00     97 Nasal Cannula 6.00 


 


18 19:28 96.9 98 20 143/84 (103) 96 Nasal Cannula 3.00 


 


18 19:00  101      


 


18 18:30     93 Nasal Cannula 6.00 


 


18 15:59      Room Air  


 


18 15:59 99.2 100 12 126/88 (101) 95 Nasal Cannula 3.00 


 


18 14:33     98 Nasal Cannula 3.00 


 


18 13:00  98      


 


18 12:10 98.9 104 22 120/79 (93) 92 Nasal Cannula 3.00 


 


18 12:10      Room Air  


 


18 10:50     96 Nasal Cannula 3.00 


 


18 08:23     98 Nasal Cannula 3.00 


 


18 08:06      Room Air  


 


18 08:06 99.6 89 30 123/87 (99) 95 Nasal Cannula 3.00 














I & O 


 


 18





 07:00


 


Intake Total 1702 ml


 


Output Total 2850 ml


 


Balance -1148 ml





Capillary Refill : Less Than 3 Seconds


General Appearance:  No Apparent Distress (currently)


Neck:  Supple


Respiratory:  Decreased Breath Sounds


Cardiovascular:  Regular Rate, Rhythm


Extremity:  No Pedal Edema


Neurologic/Psychiatric:  Alert, Oriented x3


Skin:  Warm/Dry





Results


Lab


Laboratory Tests


18 04:50: 


White Blood Count 9.2, Red Blood Count 4.20L, Hemoglobin 12.9, Hematocrit 39, 

Mean Corpuscular Volume 93, Mean Corpuscular Hemoglobin 31, Mean Corpuscular 

Hemoglobin Concent 33, Red Cell Distribution Width 13.9, Platelet Count 175, 

Mean Platelet Volume 9.1, Neutrophils (%) (Auto) 92H, Lymphocytes (%) (Auto) 4L

, Monocytes (%) (Auto) 4, Eosinophils (%) (Auto) 0, Basophils (%) (Auto) 0, 

Neutrophils # (Auto) 8.5H, Lymphocytes # (Auto) 0.4L, Monocytes # (Auto) 0.3, 

Eosinophils # (Auto) 0.0, Basophils # (Auto) 0.0, Sodium Level 141, Potassium 

Level 4.1, Chloride Level 104, Carbon Dioxide Level 25, Anion Gap 12, Blood 

Urea Nitrogen 12, Creatinine 0.80, Estimat Glomerular Filtration Rate > 60, BUN/

Creatinine Ratio 15, Glucose Level 125H, Calcium Level 8.5, Phosphorus Level 3.1

, Magnesium Level 2.4





Microbiology


18 Blood Culture - Preliminary, Resulted


         No growth


18 Influenza Types A,B Antigen (SOLIS) - Final, Complete





Assessment/Plan


Assessment/Plan


Assess & Plan/Chief Complaint


1. COPD exacerbation


-patient admitted to intensive care  unit


-She is currently on BiPAP


-Pulmonary consultation





-solu-Medrol decreased to 40 mg intravenous every 6 hours.


-patient is currently on nasal cannula oxygen


-possible move to medical floor this afternoon





-continue with Solumedrol at 40mg q6hr


-when improved to [facility]





2.  Acute respiratory distress secondary to #1





3.  Hypoxemia


-support with oxygen currently on BiPAP


-wean to nasal cannula as she improves





-continue with nasal cannula





4.  Bronchitis vs early pneumonia


-she will be started on Rocephin 1 g daily as well as Zithromax





-day number 2 Rocephin and Zithromax





-Zithromax DCed


-day 3 rocephin





Clinical Quality Measures


DVT/VTE Risk/Contraindication:


Risk Factor Score Per Nursin


RFS Level Per Nursing on Admit:  4+=Very High











NINA HENLEY MD 2018 07:51

## 2018-02-09 NOTE — PULMONARY PROGRESS NOTE
Subjective


Time Seen by Provider:  05:54


Subjective/Events-last exam


Pt had episodes last night where she became very SOB with increased WOB.





Exam


Exam





Vital Signs








  Date Time  Temp Pulse Resp B/P (MAP) Pulse Ox O2 Delivery O2 Flow Rate FiO2


 


18 04:00     98 Nasal Cannula 5.00 


 


18 01:00  101 36 156/86 (109) 95 Nasal Cannula 5.00 


 


18 01:00  101      


 


18 00:59     92 Nasal Cannula 4.00 


 


18 00:00     94 Nasal Cannula 4.00 


 


18 00:00     96 Nasal Cannula 5.00 


 


18 00:00  93 17 128/92 (104) 100 Nasal Cannula 5.00 


 


18 23:00  79 19 130/84 (99) 97 NIV Bilevel 30.00 





        


 


18 22:00  90 20  97  30.00 


 


18 22:00  90 22 133/89 (104) 98 NIV Bilevel 30.00 





        


 


18 21:00  93 28 140/100 (113) 98 Nasal Cannula 3.00 


 


18 20:00  95 20 130/89 (103) 92 Nasal Cannula 3.00 


 


18 20:00     97 Nasal Cannula 6.00 


 


18 19:28 96.9 98 20 143/84 (103) 96 Nasal Cannula 3.00 


 


18 19:00  101      


 


18 18:30     93 Nasal Cannula 6.00 


 


18 15:59      Room Air  


 


18 15:59 99.2 100 12 126/88 (101) 95 Nasal Cannula 3.00 


 


18 14:33     98 Nasal Cannula 3.00 


 


18 13:00  98      


 


18 12:10 98.9 104 22 120/79 (93) 92 Nasal Cannula 3.00 


 


18 12:10      Room Air  


 


18 10:50     96 Nasal Cannula 3.00 


 


18 08:23     98 Nasal Cannula 3.00 


 


18 08:06      Room Air  


 


18 08:06 99.6 89 30 123/87 (99) 95 Nasal Cannula 3.00 


 


18 07:13     98 Nasal Cannula 3.00 


 


18 07:00  83 19 128/86 (100) 99 NIV Bilevel 30.00 


 


18 07:00  83      


 


18 06:00  86 26 128/84 (99) 98 NIV Bilevel 30.00 














I & O 


 


 18





 07:00


 


Intake Total 1502 ml


 


Output Total 1950 ml


 


Balance -448 ml








General Appearance:  No Apparent Distress


HEENT:  Moist Mucous Membranes


Neck:  Supple


Respiratory:  Decreased Breath Sounds


Cardiovascular:  Regular Rate, Rhythm


Capillary Refill:  Less Than 3 Seconds


Gastrointestinal:  soft


Extremity:  No Pedal Edema


Neurologic/Psychiatric:  Alert, Oriented x3


Skin:  Normal Color, Warm/Dry


Lymphatic:  No Adenopathy





Results


Lab


Laboratory Tests


18 15:04








18 04:25








18 04:50











Assessment/Plan


Assessment/Plan


Acute on Chronic respiratory failure


   -Noninvasive ventilation


   -oxygen 


COPDAE/AsthmaAE 


   -SVNS  Q2 


   -Solumedrol 40 IV Q 6 


Panic attacks/psychosis


   -Risperdal BID  0.5mg BID 


Anxiety 


   -Continue home Ativan, morphine 





233





Clinical Quality Measures


DVT/VTE Risk/Contraindication:


Risk Factor Score Per Nursin


RFS Level Per Nursing on Admit:  4+=Very High











JOVON JOAQUIN DO 2018 05:55

## 2018-02-10 VITALS — DIASTOLIC BLOOD PRESSURE: 93 MMHG | SYSTOLIC BLOOD PRESSURE: 151 MMHG

## 2018-02-10 VITALS — DIASTOLIC BLOOD PRESSURE: 70 MMHG | SYSTOLIC BLOOD PRESSURE: 102 MMHG

## 2018-02-10 VITALS — DIASTOLIC BLOOD PRESSURE: 105 MMHG | SYSTOLIC BLOOD PRESSURE: 151 MMHG

## 2018-02-10 VITALS — SYSTOLIC BLOOD PRESSURE: 165 MMHG | DIASTOLIC BLOOD PRESSURE: 98 MMHG

## 2018-02-10 VITALS — SYSTOLIC BLOOD PRESSURE: 139 MMHG | DIASTOLIC BLOOD PRESSURE: 97 MMHG

## 2018-02-10 VITALS — DIASTOLIC BLOOD PRESSURE: 102 MMHG | SYSTOLIC BLOOD PRESSURE: 128 MMHG

## 2018-02-10 VITALS — SYSTOLIC BLOOD PRESSURE: 135 MMHG | DIASTOLIC BLOOD PRESSURE: 82 MMHG

## 2018-02-10 VITALS — SYSTOLIC BLOOD PRESSURE: 138 MMHG | DIASTOLIC BLOOD PRESSURE: 85 MMHG

## 2018-02-10 VITALS — SYSTOLIC BLOOD PRESSURE: 130 MMHG | DIASTOLIC BLOOD PRESSURE: 101 MMHG

## 2018-02-10 VITALS — DIASTOLIC BLOOD PRESSURE: 88 MMHG | SYSTOLIC BLOOD PRESSURE: 121 MMHG

## 2018-02-10 VITALS — SYSTOLIC BLOOD PRESSURE: 158 MMHG | DIASTOLIC BLOOD PRESSURE: 98 MMHG

## 2018-02-10 VITALS — SYSTOLIC BLOOD PRESSURE: 139 MMHG | DIASTOLIC BLOOD PRESSURE: 106 MMHG

## 2018-02-10 VITALS — DIASTOLIC BLOOD PRESSURE: 74 MMHG | SYSTOLIC BLOOD PRESSURE: 111 MMHG

## 2018-02-10 VITALS — DIASTOLIC BLOOD PRESSURE: 100 MMHG | SYSTOLIC BLOOD PRESSURE: 141 MMHG

## 2018-02-10 VITALS — SYSTOLIC BLOOD PRESSURE: 168 MMHG | DIASTOLIC BLOOD PRESSURE: 98 MMHG

## 2018-02-10 VITALS — SYSTOLIC BLOOD PRESSURE: 136 MMHG | DIASTOLIC BLOOD PRESSURE: 97 MMHG

## 2018-02-10 VITALS — DIASTOLIC BLOOD PRESSURE: 98 MMHG | SYSTOLIC BLOOD PRESSURE: 139 MMHG

## 2018-02-10 VITALS — DIASTOLIC BLOOD PRESSURE: 97 MMHG | SYSTOLIC BLOOD PRESSURE: 150 MMHG

## 2018-02-10 VITALS — DIASTOLIC BLOOD PRESSURE: 92 MMHG | SYSTOLIC BLOOD PRESSURE: 162 MMHG

## 2018-02-10 VITALS — SYSTOLIC BLOOD PRESSURE: 133 MMHG | DIASTOLIC BLOOD PRESSURE: 94 MMHG

## 2018-02-10 VITALS — DIASTOLIC BLOOD PRESSURE: 85 MMHG | SYSTOLIC BLOOD PRESSURE: 128 MMHG

## 2018-02-10 VITALS — DIASTOLIC BLOOD PRESSURE: 100 MMHG | SYSTOLIC BLOOD PRESSURE: 153 MMHG

## 2018-02-10 VITALS — SYSTOLIC BLOOD PRESSURE: 136 MMHG | DIASTOLIC BLOOD PRESSURE: 104 MMHG

## 2018-02-10 VITALS — SYSTOLIC BLOOD PRESSURE: 137 MMHG | DIASTOLIC BLOOD PRESSURE: 109 MMHG

## 2018-02-10 LAB
ALBUMIN SERPL-MCNC: 3.9 GM/DL (ref 3.2–4.5)
ALP SERPL-CCNC: 73 U/L (ref 40–136)
ALT SERPL-CCNC: 20 U/L (ref 0–55)
ARTERIAL PATENCY WRIST A: (no result)
BASE EXCESS STD BLDA CALC-SCNC: 2.9 MMOL/L (ref -2.5–2.5)
BASOPHILS # BLD AUTO: 0 10^3/UL (ref 0–0.1)
BASOPHILS NFR BLD AUTO: 0 % (ref 0–10)
BDY SITE: (no result)
BILIRUB SERPL-MCNC: 0.2 MG/DL (ref 0.1–1)
BODY TEMPERATURE: 96.4
BUN/CREAT SERPL: 24
CALCIUM SERPL-MCNC: 8.8 MG/DL (ref 8.5–10.1)
CHLORIDE SERPL-SCNC: 106 MMOL/L (ref 98–107)
CO2 BLDA CALC-SCNC: 30.2 MMOL/L (ref 21–31)
CO2 SERPL-SCNC: 22 MMOL/L (ref 21–32)
CREAT SERPL-MCNC: 0.75 MG/DL (ref 0.6–1.3)
EOSINOPHIL # BLD AUTO: 0 10^3/UL (ref 0–0.3)
EOSINOPHIL NFR BLD AUTO: 0 % (ref 0–10)
ERYTHROCYTE [DISTWIDTH] IN BLOOD BY AUTOMATED COUNT: 14.1 % (ref 10–14.5)
GFR SERPLBLD BASED ON 1.73 SQ M-ARVRAT: > 60 ML/MIN
GLUCOSE SERPL-MCNC: 130 MG/DL (ref 70–105)
HCT VFR BLD CALC: 41 % (ref 35–52)
HGB BLD-MCNC: 13 G/DL (ref 11.5–16)
LYMPHOCYTES # BLD AUTO: 0.5 X 10^3 (ref 1–4)
LYMPHOCYTES NFR BLD AUTO: 5 % (ref 12–44)
MANUAL DIFFERENTIAL PERFORMED BLD QL: NO
MCH RBC QN AUTO: 30 PG (ref 25–34)
MCHC RBC AUTO-ENTMCNC: 32 G/DL (ref 32–36)
MCV RBC AUTO: 94 FL (ref 80–99)
MONOCYTES # BLD AUTO: 0.3 X 10^3 (ref 0–1)
MONOCYTES NFR BLD AUTO: 3 % (ref 0–12)
NEUTROPHILS # BLD AUTO: 8.8 X 10^3 (ref 1.8–7.8)
NEUTROPHILS NFR BLD AUTO: 92 % (ref 42–75)
PCO2 BLDA: 53 MMHG (ref 35–45)
PH BLDA: 7.35 [PH] (ref 7.37–7.43)
PLATELET # BLD: 198 10^3/UL (ref 130–400)
PMV BLD AUTO: 9.2 FL (ref 7.4–10.4)
PO2 BLDA: 84 MMHG (ref 79–93)
POTASSIUM SERPL-SCNC: 3.9 MMOL/L (ref 3.6–5)
PROT SERPL-MCNC: 6.3 GM/DL (ref 6.4–8.2)
RBC # BLD AUTO: 4.35 10^6/UL (ref 4.35–5.85)
SAO2 % BLDA FROM PO2: 98 % (ref 94–100)
SODIUM SERPL-SCNC: 140 MMOL/L (ref 135–145)
VENTILATION MODE VENT: NO
WBC # BLD AUTO: 9.6 10^3/UL (ref 4.3–11)

## 2018-02-10 RX ADMIN — LORAZEPAM PRN MG: 1 TABLET ORAL at 05:55

## 2018-02-10 RX ADMIN — RISPERIDONE SCH MG: 1 TABLET, FILM COATED ORAL at 20:21

## 2018-02-10 RX ADMIN — AZITHROMYCIN SCH MG: 250 TABLET, FILM COATED ORAL at 08:36

## 2018-02-10 RX ADMIN — RISPERIDONE SCH MG: 1 TABLET, FILM COATED ORAL at 08:35

## 2018-02-10 RX ADMIN — MORPHINE SULFATE PRN MG: 4 INJECTION, SOLUTION INTRAMUSCULAR; INTRAVENOUS at 16:04

## 2018-02-10 RX ADMIN — GUAIFENESIN AND DEXTROMETHORPHAN PRN ML: 100; 10 SYRUP ORAL at 08:36

## 2018-02-10 RX ADMIN — ALBUTEROL SULFATE SCH GM: 90 AEROSOL, METERED RESPIRATORY (INHALATION) at 03:42

## 2018-02-10 RX ADMIN — GUAIFENESIN AND DEXTROMETHORPHAN PRN ML: 100; 10 SYRUP ORAL at 15:46

## 2018-02-10 RX ADMIN — ALBUTEROL SULFATE SCH GM: 90 AEROSOL, METERED RESPIRATORY (INHALATION) at 06:36

## 2018-02-10 RX ADMIN — TRAZODONE HYDROCHLORIDE SCH MG: 50 TABLET ORAL at 20:21

## 2018-02-10 RX ADMIN — ALBUTEROL SULFATE SCH GM: 90 AEROSOL, METERED RESPIRATORY (INHALATION) at 10:25

## 2018-02-10 RX ADMIN — ALBUTEROL SULFATE SCH GM: 90 AEROSOL, METERED RESPIRATORY (INHALATION) at 15:50

## 2018-02-10 RX ADMIN — ALBUTEROL SULFATE SCH PUFF: 90 AEROSOL, METERED RESPIRATORY (INHALATION) at 18:29

## 2018-02-10 RX ADMIN — Medication SCH ML: at 21:53

## 2018-02-10 RX ADMIN — UMECLIDINIUM SCH INH: 62.5 AEROSOL, POWDER ORAL at 06:35

## 2018-02-10 RX ADMIN — AMITRIPTYLINE HYDROCHLORIDE SCH MG: 25 TABLET, FILM COATED ORAL at 08:36

## 2018-02-10 RX ADMIN — ALBUTEROL SULFATE SCH GM: 90 AEROSOL, METERED RESPIRATORY (INHALATION) at 14:05

## 2018-02-10 RX ADMIN — BENZONATATE PRN MG: 100 CAPSULE ORAL at 08:35

## 2018-02-10 RX ADMIN — METHYLPREDNISOLONE SODIUM SUCCINATE SCH MG: 40 INJECTION, POWDER, FOR SOLUTION INTRAMUSCULAR; INTRAVENOUS at 17:33

## 2018-02-10 RX ADMIN — LORAZEPAM PRN MG: 1 TABLET ORAL at 17:05

## 2018-02-10 RX ADMIN — MORPHINE SULFATE PRN MG: 4 INJECTION, SOLUTION INTRAMUSCULAR; INTRAVENOUS at 01:42

## 2018-02-10 RX ADMIN — ALBUTEROL SULFATE SCH GM: 90 AEROSOL, METERED RESPIRATORY (INHALATION) at 02:28

## 2018-02-10 RX ADMIN — ALBUTEROL SULFATE SCH PUFF: 90 AEROSOL, METERED RESPIRATORY (INHALATION) at 20:17

## 2018-02-10 RX ADMIN — METHYLPREDNISOLONE SODIUM SUCCINATE SCH MG: 40 INJECTION, POWDER, FOR SOLUTION INTRAMUSCULAR; INTRAVENOUS at 05:51

## 2018-02-10 RX ADMIN — MORPHINE SULFATE PRN MG: 4 INJECTION, SOLUTION INTRAMUSCULAR; INTRAVENOUS at 20:21

## 2018-02-10 RX ADMIN — MORPHINE SULFATE PRN MG: 4 INJECTION, SOLUTION INTRAMUSCULAR; INTRAVENOUS at 11:00

## 2018-02-10 RX ADMIN — METHYLPREDNISOLONE SODIUM SUCCINATE SCH MG: 40 INJECTION, POWDER, FOR SOLUTION INTRAMUSCULAR; INTRAVENOUS at 12:28

## 2018-02-10 RX ADMIN — ALBUTEROL SULFATE PRN MG: 2.5 SOLUTION RESPIRATORY (INHALATION) at 22:12

## 2018-02-10 RX ADMIN — ENOXAPARIN SODIUM SCH MG: 100 INJECTION SUBCUTANEOUS at 14:29

## 2018-02-10 RX ADMIN — ALBUTEROL SULFATE SCH GM: 90 AEROSOL, METERED RESPIRATORY (INHALATION) at 08:31

## 2018-02-10 RX ADMIN — BENZONATATE PRN MG: 100 CAPSULE ORAL at 15:46

## 2018-02-10 RX ADMIN — SODIUM CHLORIDE SCH MLS/HR: 900 INJECTION, SOLUTION INTRAVENOUS at 07:41

## 2018-02-10 RX ADMIN — ALBUTEROL SULFATE SCH GM: 90 AEROSOL, METERED RESPIRATORY (INHALATION) at 00:36

## 2018-02-10 RX ADMIN — METHYLPREDNISOLONE SODIUM SUCCINATE SCH MG: 40 INJECTION, POWDER, FOR SOLUTION INTRAMUSCULAR; INTRAVENOUS at 23:19

## 2018-02-10 RX ADMIN — Medication SCH ML: at 06:03

## 2018-02-10 RX ADMIN — Medication SCH ML: at 12:29

## 2018-02-10 NOTE — PROGRESS NOTE (SOAP)
Subjective


Subjective/Events-last exam


Used bipap all night, is still having marked tachypnea, using bipap again this 

morning. She states she does feel a little better on the bipap.


Review of Systems


Date Seen by Provider:  Feb 10, 2018


Time Seen by Provider:  10:35





Objective


Exam


Last Set of Vital Signs





Vital Signs








  Date Time  Temp Pulse Resp B/P (MAP) Pulse Ox O2 Delivery O2 Flow Rate FiO2


 


2/10/18 11:03 97.5 93 12 128/85 (99) 100 NIV Bilevel 30.00 


 


2/10/18 08:31        40





Capillary Refill : Less Than 3 Seconds


I&O











Intake and Output 


 


 2/10/18





 00:00


 


Intake Total 1750 ml


 


Output Total 1660 ml


 


Balance 90 ml


 


 


 


Intake Oral 750 ml


 


IV Total 1000 ml


 


Output Urine Total 1660 ml








General:  Alert, Mild Distress


Lungs:  Other (decreased air movement)


Extremities:  No Edema


Neuro:  Normal Speech





Results/Procedures


Lab


Laboratory Tests


2/10/18 03:19: 


White Blood Count 9.6, Red Blood Count 4.35, Hemoglobin 13.0, Hematocrit 41, 

Mean Corpuscular Volume 94, Mean Corpuscular Hemoglobin 30, Mean Corpuscular 

Hemoglobin Concent 32, Red Cell Distribution Width 14.1, Platelet Count 198, 

Mean Platelet Volume 9.2, Neutrophils (%) (Auto) 92H, Lymphocytes (%) (Auto) 5L

, Monocytes (%) (Auto) 3, Eosinophils (%) (Auto) 0, Basophils (%) (Auto) 0, 

Neutrophils # (Auto) 8.8H, Lymphocytes # (Auto) 0.5L, Monocytes # (Auto) 0.3, 

Eosinophils # (Auto) 0.0, Basophils # (Auto) 0.0, Sodium Level 140, Potassium 

Level 3.9, Chloride Level 106, Carbon Dioxide Level 22, Anion Gap 12, Blood 

Urea Nitrogen 18, Creatinine 0.75, Estimat Glomerular Filtration Rate > 60, BUN/

Creatinine Ratio 24, Glucose Level 130H, Calcium Level 8.8, Total Bilirubin 0.2

, Aspartate Amino Transf (AST/SGOT) 30, Alanine Aminotransferase (ALT/SGPT) 20, 

Alkaline Phosphatase 73, Total Protein 6.3L, Albumin 3.9


2/10/18 04:40: 


Blood Gas Puncture Site RT RADIAL, Blood Gas Patient Temperature 96.4, Arterial 

Blood pH 7.35L, Arterial Blood Partial Pressure CO2 53H, Arterial Blood Partial 

Pressure O2 84, Arterial Blood HCO3 28H, Arterial Blood Total CO2 30.2, 

Arterial Blood Oxygen Saturation 98, Arterial Blood Base Excess 2.9H, Hermes 

Test YES-POS, Blood Gas Ventilator Setting NO, Blood Gas Inspired Oxygen 35%





Microbiology


18 Blood Culture - Preliminary, Resulted


         No growth


18 Influenza Types A,B Antigen (SOLIS) - Final, Complete





Assessment/Plan


Assessment/Plan


Plan


1. COPD exacerbation


-patient admitted to intensive care  unit


-She is currently on BiPAP


-Pulmonary consultation





-solu-Medrol decreased to 40 mg intravenous every 6 hours.


-patient is currently on nasal cannula oxygen


-possible move to medical floor this afternoon





-continue with Solumedrol at 40mg q6hr


-when improved to [facility] medical


2/10- requiring persistent bipap with minimal improvement, continue solumedrol 

at 40 q6, albuterol q2 and umeclidinium, she does state she would want 

intubation if needed





2.  Acute respiratory distress secondary to #1





3.  Hypoxemia





4.  Bronchitis vs early pneumonia


-she will be started on Rocephin 1 g daily as well as Zithromax


2/10- continue azithromycin and ceftriaxone





5. DVT ppx- enoxaparin








Clinical Quality Measures


DVT/VTE Risk/Contraindication:


Risk Factor Score Per Nursin


RFS Level Per Nursing on Admit:  4+=Very High











CR BROWER MD Feb 10, 2018 1:28 pm

## 2018-02-11 VITALS — SYSTOLIC BLOOD PRESSURE: 155 MMHG | DIASTOLIC BLOOD PRESSURE: 96 MMHG

## 2018-02-11 VITALS — DIASTOLIC BLOOD PRESSURE: 80 MMHG | SYSTOLIC BLOOD PRESSURE: 136 MMHG

## 2018-02-11 VITALS — SYSTOLIC BLOOD PRESSURE: 134 MMHG | DIASTOLIC BLOOD PRESSURE: 95 MMHG

## 2018-02-11 VITALS — DIASTOLIC BLOOD PRESSURE: 94 MMHG | SYSTOLIC BLOOD PRESSURE: 164 MMHG

## 2018-02-11 VITALS — DIASTOLIC BLOOD PRESSURE: 88 MMHG | SYSTOLIC BLOOD PRESSURE: 141 MMHG

## 2018-02-11 VITALS — SYSTOLIC BLOOD PRESSURE: 147 MMHG | DIASTOLIC BLOOD PRESSURE: 97 MMHG

## 2018-02-11 VITALS — DIASTOLIC BLOOD PRESSURE: 100 MMHG | SYSTOLIC BLOOD PRESSURE: 173 MMHG

## 2018-02-11 VITALS — SYSTOLIC BLOOD PRESSURE: 135 MMHG | DIASTOLIC BLOOD PRESSURE: 82 MMHG

## 2018-02-11 VITALS — SYSTOLIC BLOOD PRESSURE: 164 MMHG | DIASTOLIC BLOOD PRESSURE: 94 MMHG

## 2018-02-11 VITALS — SYSTOLIC BLOOD PRESSURE: 156 MMHG | DIASTOLIC BLOOD PRESSURE: 118 MMHG

## 2018-02-11 VITALS — DIASTOLIC BLOOD PRESSURE: 94 MMHG | SYSTOLIC BLOOD PRESSURE: 142 MMHG

## 2018-02-11 VITALS — SYSTOLIC BLOOD PRESSURE: 158 MMHG | DIASTOLIC BLOOD PRESSURE: 96 MMHG

## 2018-02-11 VITALS — DIASTOLIC BLOOD PRESSURE: 83 MMHG | SYSTOLIC BLOOD PRESSURE: 158 MMHG

## 2018-02-11 VITALS — SYSTOLIC BLOOD PRESSURE: 163 MMHG | DIASTOLIC BLOOD PRESSURE: 115 MMHG

## 2018-02-11 VITALS — SYSTOLIC BLOOD PRESSURE: 155 MMHG | DIASTOLIC BLOOD PRESSURE: 117 MMHG

## 2018-02-11 VITALS — DIASTOLIC BLOOD PRESSURE: 102 MMHG | SYSTOLIC BLOOD PRESSURE: 164 MMHG

## 2018-02-11 VITALS — SYSTOLIC BLOOD PRESSURE: 154 MMHG | DIASTOLIC BLOOD PRESSURE: 96 MMHG

## 2018-02-11 VITALS — SYSTOLIC BLOOD PRESSURE: 169 MMHG | DIASTOLIC BLOOD PRESSURE: 100 MMHG

## 2018-02-11 VITALS — DIASTOLIC BLOOD PRESSURE: 86 MMHG | SYSTOLIC BLOOD PRESSURE: 130 MMHG

## 2018-02-11 VITALS — SYSTOLIC BLOOD PRESSURE: 134 MMHG | DIASTOLIC BLOOD PRESSURE: 84 MMHG

## 2018-02-11 VITALS — SYSTOLIC BLOOD PRESSURE: 128 MMHG | DIASTOLIC BLOOD PRESSURE: 89 MMHG

## 2018-02-11 VITALS — DIASTOLIC BLOOD PRESSURE: 96 MMHG | SYSTOLIC BLOOD PRESSURE: 151 MMHG

## 2018-02-11 VITALS — DIASTOLIC BLOOD PRESSURE: 99 MMHG | SYSTOLIC BLOOD PRESSURE: 161 MMHG

## 2018-02-11 VITALS — SYSTOLIC BLOOD PRESSURE: 142 MMHG | DIASTOLIC BLOOD PRESSURE: 86 MMHG

## 2018-02-11 VITALS — DIASTOLIC BLOOD PRESSURE: 92 MMHG | SYSTOLIC BLOOD PRESSURE: 151 MMHG

## 2018-02-11 VITALS — DIASTOLIC BLOOD PRESSURE: 83 MMHG | SYSTOLIC BLOOD PRESSURE: 135 MMHG

## 2018-02-11 LAB
ARTERIAL PATENCY WRIST A: (no result)
ARTERIAL PATENCY WRIST A: (no result)
ARTERIAL PATENCY WRIST A: POSITIVE
BASE EXCESS STD BLDA CALC-SCNC: 10 MMOL/L (ref -2.5–2.5)
BASE EXCESS STD BLDA CALC-SCNC: 7.6 MMOL/L (ref -2.5–2.5)
BASE EXCESS STD BLDA CALC-SCNC: 8.3 MMOL/L (ref -2.5–2.5)
BASOPHILS # BLD AUTO: 0 10^3/UL (ref 0–0.1)
BASOPHILS NFR BLD AUTO: 0 % (ref 0–10)
BDY SITE: (no result)
BODY TEMPERATURE: 96.5
BODY TEMPERATURE: 96.5
BODY TEMPERATURE: 97.1
BUN/CREAT SERPL: 25
CALCIUM SERPL-MCNC: 8.4 MG/DL (ref 8.5–10.1)
CHLORIDE SERPL-SCNC: 99 MMOL/L (ref 98–107)
CO2 BLDA CALC-SCNC: 34.5 MMOL/L (ref 21–31)
CO2 BLDA CALC-SCNC: 34.6 MMOL/L (ref 21–31)
CO2 BLDA CALC-SCNC: 35.1 MMOL/L (ref 21–31)
CO2 SERPL-SCNC: 27 MMOL/L (ref 21–32)
CREAT SERPL-MCNC: 0.73 MG/DL (ref 0.6–1.3)
EOSINOPHIL # BLD AUTO: 0 10^3/UL (ref 0–0.3)
EOSINOPHIL NFR BLD AUTO: 0 % (ref 0–10)
ERYTHROCYTE [DISTWIDTH] IN BLOOD BY AUTOMATED COUNT: 13.7 % (ref 10–14.5)
GFR SERPLBLD BASED ON 1.73 SQ M-ARVRAT: > 60 ML/MIN
GLUCOSE SERPL-MCNC: 151 MG/DL (ref 70–105)
HCT VFR BLD CALC: 37 % (ref 35–52)
HGB BLD-MCNC: 12 G/DL (ref 11.5–16)
INHALED O2 FLOW RATE: (no result) L/MIN
LYMPHOCYTES # BLD AUTO: 0.4 X 10^3 (ref 1–4)
LYMPHOCYTES NFR BLD AUTO: 6 % (ref 12–44)
MAGNESIUM SERPL-MCNC: 2.8 MG/DL (ref 1.8–2.4)
MANUAL DIFFERENTIAL PERFORMED BLD QL: NO
MCH RBC QN AUTO: 30 PG (ref 25–34)
MCHC RBC AUTO-ENTMCNC: 32 G/DL (ref 32–36)
MCV RBC AUTO: 93 FL (ref 80–99)
MONOCYTES # BLD AUTO: 0.4 X 10^3 (ref 0–1)
MONOCYTES NFR BLD AUTO: 5 % (ref 0–12)
NEUTROPHILS # BLD AUTO: 6.8 X 10^3 (ref 1.8–7.8)
NEUTROPHILS NFR BLD AUTO: 89 % (ref 42–75)
PCO2 BLDA: 42 MMHG (ref 35–45)
PCO2 BLDA: 49 MMHG (ref 35–45)
PCO2 BLDA: 54 MMHG (ref 35–45)
PH BLDA: 7.39 [PH] (ref 7.37–7.43)
PH BLDA: 7.44 [PH] (ref 7.37–7.43)
PH BLDA: 7.51 [PH] (ref 7.37–7.43)
PHOSPHATE SERPL-MCNC: 1.9 MG/DL (ref 2.3–4.7)
PLATELET # BLD: 174 10^3/UL (ref 130–400)
PMV BLD AUTO: 9.2 FL (ref 7.4–10.4)
PO2 BLDA: 43 MMHG (ref 79–93)
PO2 BLDA: 63 MMHG (ref 79–93)
PO2 BLDA: 67 MMHG (ref 79–93)
POTASSIUM SERPL-SCNC: 3.5 MMOL/L (ref 3.6–5)
RBC # BLD AUTO: 4.03 10^6/UL (ref 4.35–5.85)
SAO2 % BLDA FROM PO2: 77 % (ref 94–100)
SAO2 % BLDA FROM PO2: 95 % (ref 94–100)
SAO2 % BLDA FROM PO2: 96 % (ref 94–100)
SODIUM SERPL-SCNC: 138 MMOL/L (ref 135–145)
VENTILATION MODE VENT: NO
VENTILATION MODE VENT: NO
VENTILATION MODE VENT: YES
WBC # BLD AUTO: 7.7 10^3/UL (ref 4.3–11)

## 2018-02-11 RX ADMIN — GUAIFENESIN AND DEXTROMETHORPHAN PRN ML: 100; 10 SYRUP ORAL at 08:13

## 2018-02-11 RX ADMIN — Medication SCH ML: at 05:06

## 2018-02-11 RX ADMIN — METHYLPREDNISOLONE SODIUM SUCCINATE SCH MG: 40 INJECTION, POWDER, FOR SOLUTION INTRAMUSCULAR; INTRAVENOUS at 18:53

## 2018-02-11 RX ADMIN — LORAZEPAM PRN MG: 1 TABLET ORAL at 14:25

## 2018-02-11 RX ADMIN — BENZONATATE PRN MG: 100 CAPSULE ORAL at 22:25

## 2018-02-11 RX ADMIN — AMITRIPTYLINE HYDROCHLORIDE SCH MG: 25 TABLET, FILM COATED ORAL at 08:12

## 2018-02-11 RX ADMIN — ALBUTEROL SULFATE SCH PUFF: 90 AEROSOL, METERED RESPIRATORY (INHALATION) at 10:49

## 2018-02-11 RX ADMIN — POTASSIUM CHLORIDE SCH MEQ: 1500 TABLET, EXTENDED RELEASE ORAL at 05:07

## 2018-02-11 RX ADMIN — GUAIFENESIN AND DEXTROMETHORPHAN PRN ML: 100; 10 SYRUP ORAL at 14:25

## 2018-02-11 RX ADMIN — ALBUTEROL SULFATE SCH PUFF: 90 AEROSOL, METERED RESPIRATORY (INHALATION) at 08:39

## 2018-02-11 RX ADMIN — ALBUTEROL SULFATE SCH PUFF: 90 AEROSOL, METERED RESPIRATORY (INHALATION) at 14:22

## 2018-02-11 RX ADMIN — METHYLPREDNISOLONE SODIUM SUCCINATE SCH MG: 40 INJECTION, POWDER, FOR SOLUTION INTRAMUSCULAR; INTRAVENOUS at 05:29

## 2018-02-11 RX ADMIN — UMECLIDINIUM SCH INH: 62.5 AEROSOL, POWDER ORAL at 10:49

## 2018-02-11 RX ADMIN — Medication SCH ML: at 14:56

## 2018-02-11 RX ADMIN — ALBUTEROL SULFATE SCH PUFF: 90 AEROSOL, METERED RESPIRATORY (INHALATION) at 12:47

## 2018-02-11 RX ADMIN — AZITHROMYCIN SCH MG: 250 TABLET, FILM COATED ORAL at 08:12

## 2018-02-11 RX ADMIN — LORAZEPAM PRN MG: 1 TABLET ORAL at 20:30

## 2018-02-11 RX ADMIN — SODIUM CHLORIDE SCH MLS/HR: 900 INJECTION, SOLUTION INTRAVENOUS at 03:08

## 2018-02-11 RX ADMIN — ENOXAPARIN SODIUM SCH MG: 100 INJECTION SUBCUTANEOUS at 14:56

## 2018-02-11 RX ADMIN — Medication SCH ML: at 21:23

## 2018-02-11 RX ADMIN — GUAIFENESIN AND DEXTROMETHORPHAN PRN ML: 100; 10 SYRUP ORAL at 22:25

## 2018-02-11 RX ADMIN — POTASSIUM CHLORIDE SCH MLS/HR: 200 INJECTION, SOLUTION INTRAVENOUS at 05:08

## 2018-02-11 RX ADMIN — MORPHINE SULFATE PRN MG: 4 INJECTION, SOLUTION INTRAMUSCULAR; INTRAVENOUS at 09:52

## 2018-02-11 RX ADMIN — ALBUTEROL SULFATE PRN MG: 2.5 SOLUTION RESPIRATORY (INHALATION) at 01:50

## 2018-02-11 RX ADMIN — BENZONATATE PRN MG: 100 CAPSULE ORAL at 08:13

## 2018-02-11 RX ADMIN — MAGNESIUM SULFATE IN DEXTROSE SCH MLS/HR: 10 INJECTION, SOLUTION INTRAVENOUS at 05:06

## 2018-02-11 RX ADMIN — MORPHINE SULFATE PRN MG: 4 INJECTION, SOLUTION INTRAMUSCULAR; INTRAVENOUS at 01:14

## 2018-02-11 RX ADMIN — METHYLPREDNISOLONE SODIUM SUCCINATE SCH MG: 40 INJECTION, POWDER, FOR SOLUTION INTRAMUSCULAR; INTRAVENOUS at 11:28

## 2018-02-11 RX ADMIN — HYDROCHLOROTHIAZIDE SCH MG: 25 TABLET ORAL at 08:13

## 2018-02-11 RX ADMIN — ALBUTEROL SULFATE PRN MG: 2.5 SOLUTION RESPIRATORY (INHALATION) at 00:17

## 2018-02-11 RX ADMIN — ALBUTEROL SULFATE SCH PUFF: 90 AEROSOL, METERED RESPIRATORY (INHALATION) at 18:57

## 2018-02-11 RX ADMIN — ALBUTEROL SULFATE SCH PUFF: 90 AEROSOL, METERED RESPIRATORY (INHALATION) at 20:31

## 2018-02-11 RX ADMIN — MORPHINE SULFATE PRN MG: 4 INJECTION, SOLUTION INTRAMUSCULAR; INTRAVENOUS at 20:27

## 2018-02-11 RX ADMIN — TRAZODONE HYDROCHLORIDE SCH MG: 50 TABLET ORAL at 22:25

## 2018-02-11 RX ADMIN — ALBUTEROL SULFATE PRN MG: 2.5 SOLUTION RESPIRATORY (INHALATION) at 04:02

## 2018-02-11 RX ADMIN — RISPERIDONE SCH MG: 1 TABLET, FILM COATED ORAL at 22:25

## 2018-02-11 RX ADMIN — RISPERIDONE SCH MG: 1 TABLET, FILM COATED ORAL at 08:13

## 2018-02-11 RX ADMIN — ALBUTEROL SULFATE SCH PUFF: 90 AEROSOL, METERED RESPIRATORY (INHALATION) at 22:47

## 2018-02-11 RX ADMIN — ALBUTEROL SULFATE SCH PUFF: 90 AEROSOL, METERED RESPIRATORY (INHALATION) at 06:51

## 2018-02-11 NOTE — DIAGNOSTIC IMAGING REPORT
INDICATION: Acute respiratory distress and COPD exacerbation.



Comparison is made with prior examination from 2/9/18.



FINDINGS: Heart size is normal. There is air trapping compatible

with COPD. There is no pleural effusion or pneumothorax.

Mediastinum is unremarkable.



IMPRESSION: COPD



Dictated by: 



  Dictated on workstation # [id number]

## 2018-02-11 NOTE — PROGRESS NOTE (SOAP)
Subjective


Subjective/Events-last exam


Worsening respiratory status in spite of near constant bipap use. She is having 

difficulty catching her breath even when removing bipap to take medications.


Review of Systems


Date Seen by Provider:  2018


Time Seen by Provider:  12:55





Objective


Exam


Last Set of Vital Signs





Vital Signs








  Date Time  Temp Pulse Resp B/P (MAP) Pulse Ox O2 Delivery O2 Flow Rate FiO2


 


18 16:20 97.3     NIV Bilevel 30.00 


 


18 15:00  108 16 163/115 (131) 97   


 


18 14:24        30





Capillary Refill : Less Than 3 Seconds


I&O











Intake and Output 


 


 18





 00:00


 


Intake Total 1560 ml


 


Output Total 1425 ml


 


Balance 135 ml


 


 


 


Intake Oral 560 ml


 


IV Total 1000 ml


 


Output Urine Total 1425 ml








General:  Alert, Moderate Distress


Lungs:  Other (decreased air movement)


Heart:  Other (tachycardic)


Neuro:  Normal Speech


Psych/Mental Status:  Mental Status NL





Results/Procedures


Lab


Laboratory Tests


18 03:13: 


White Blood Count 7.7, Red Blood Count 4.03L, Hemoglobin 12.0, Hematocrit 37, 

Mean Corpuscular Volume 93, Mean Corpuscular Hemoglobin 30, Mean Corpuscular 

Hemoglobin Concent 32, Red Cell Distribution Width 13.7, Platelet Count 174, 

Mean Platelet Volume 9.2, Neutrophils (%) (Auto) 89H, Lymphocytes (%) (Auto) 6L

, Monocytes (%) (Auto) 5, Eosinophils (%) (Auto) 0, Basophils (%) (Auto) 0, 

Neutrophils # (Auto) 6.8, Lymphocytes # (Auto) 0.4L, Monocytes # (Auto) 0.4, 

Eosinophils # (Auto) 0.0, Basophils # (Auto) 0.0, Sodium Level 138, Potassium 

Level 3.5L, Chloride Level 99, Carbon Dioxide Level 27, Anion Gap 12, Blood 

Urea Nitrogen 18, Creatinine 0.73, Estimat Glomerular Filtration Rate > 60, BUN/

Creatinine Ratio 25, Glucose Level 151H, Calcium Level 8.4L, Phosphorus Level 

1.9L, Magnesium Level 2.8H


18 04:20: 


Blood Gas Puncture Site RIGHT RADIAL, Blood Gas Patient Temperature 96.5, 

Arterial Blood pH 7.39, Arterial Blood Partial Pressure CO2 54H, Arterial Blood 

Partial Pressure O2 43L, Arterial Blood HCO3 33H, Arterial Blood Total CO2 34.6H

, Arterial Blood Oxygen Saturation 77L, Arterial Blood Base Excess 7.6H, Hermes 

Test YES-POS, Blood Gas Ventilator Setting NO, Blood Gas Inspired Oxygen 30%


18 04:35: 


Blood Gas Puncture Site RIGHT RADIAL, Blood Gas Patient Temperature 96.5, 

Arterial Blood pH 7.44H, Arterial Blood Partial Pressure CO2 49H, Arterial 

Blood Partial Pressure O2 67L, Arterial Blood HCO3 33H, Arterial Blood Total 

CO2 34.5H, Arterial Blood Oxygen Saturation 96, Arterial Blood Base Excess 8.3H

, Hermes Test YES-POS, Blood Gas Ventilator Setting NO, Blood Gas Inspired 

Oxygen 30%





Microbiology


18 Blood Culture - Preliminary, Resulted


         No growth


18 Influenza Types A,B Antigen (SOLIS) - Final, Complete





Assessment/Plan


Assessment/Plan


Plan


1. COPD exacerbation


-patient admitted to intensive care  unit


-She is currently on BiPAP


-Pulmonary consultation





-solu-Medrol decreased to 40 mg intravenous every 6 hours.


-patient is currently on nasal cannula oxygen


-possible move to medical floor this afternoon





-continue with Solumedrol at 40mg q6hr


-when improved to [facility] medical


2/10- requiring persistent bipap with minimal improvement, continue solumedrol 

at 40 q6, albuterol q2 and umeclidinium, she does state she would want 

intubation if needed


- continues to decline in spite of steroids and breathing treatment and 

antibiotics, discussed with Dr. Jackman and agree at this time she is nearing 

need for intubation, discussed with patient and she is undecided as to whether 

she wants to be intubated or pursue comfort goals. She is going to discuss with 

her family and let us know.





2.  Acute respiratory distress secondary to #1





3.  Hypoxemia





4.  Bronchitis vs early pneumonia


-she will be started on Rocephin 1 g daily as well as Zithromax


2/10- continue azithromycin and ceftriaxone





5. DVT ppx- enoxaparin








Clinical Quality Measures


DVT/VTE Risk/Contraindication:


Risk Factor Score Per Nursin


RFS Level Per Nursing on Admit:  4+=Very High











CR BROWER MD 2018 5:17 pm

## 2018-02-12 VITALS — DIASTOLIC BLOOD PRESSURE: 97 MMHG | SYSTOLIC BLOOD PRESSURE: 147 MMHG

## 2018-02-12 VITALS — DIASTOLIC BLOOD PRESSURE: 106 MMHG | SYSTOLIC BLOOD PRESSURE: 156 MMHG

## 2018-02-12 VITALS — DIASTOLIC BLOOD PRESSURE: 102 MMHG | SYSTOLIC BLOOD PRESSURE: 150 MMHG

## 2018-02-12 VITALS — SYSTOLIC BLOOD PRESSURE: 131 MMHG | DIASTOLIC BLOOD PRESSURE: 85 MMHG

## 2018-02-12 VITALS — DIASTOLIC BLOOD PRESSURE: 80 MMHG | SYSTOLIC BLOOD PRESSURE: 148 MMHG

## 2018-02-12 VITALS — DIASTOLIC BLOOD PRESSURE: 92 MMHG | SYSTOLIC BLOOD PRESSURE: 129 MMHG

## 2018-02-12 VITALS — DIASTOLIC BLOOD PRESSURE: 105 MMHG | SYSTOLIC BLOOD PRESSURE: 125 MMHG

## 2018-02-12 VITALS — SYSTOLIC BLOOD PRESSURE: 149 MMHG | DIASTOLIC BLOOD PRESSURE: 90 MMHG

## 2018-02-12 VITALS — DIASTOLIC BLOOD PRESSURE: 89 MMHG | SYSTOLIC BLOOD PRESSURE: 126 MMHG

## 2018-02-12 VITALS — DIASTOLIC BLOOD PRESSURE: 108 MMHG | SYSTOLIC BLOOD PRESSURE: 157 MMHG

## 2018-02-12 VITALS — SYSTOLIC BLOOD PRESSURE: 113 MMHG | DIASTOLIC BLOOD PRESSURE: 75 MMHG

## 2018-02-12 VITALS — DIASTOLIC BLOOD PRESSURE: 94 MMHG | SYSTOLIC BLOOD PRESSURE: 145 MMHG

## 2018-02-12 VITALS — SYSTOLIC BLOOD PRESSURE: 158 MMHG | DIASTOLIC BLOOD PRESSURE: 87 MMHG

## 2018-02-12 VITALS — DIASTOLIC BLOOD PRESSURE: 69 MMHG | SYSTOLIC BLOOD PRESSURE: 138 MMHG

## 2018-02-12 VITALS — SYSTOLIC BLOOD PRESSURE: 162 MMHG | DIASTOLIC BLOOD PRESSURE: 96 MMHG

## 2018-02-12 VITALS — SYSTOLIC BLOOD PRESSURE: 142 MMHG | DIASTOLIC BLOOD PRESSURE: 86 MMHG

## 2018-02-12 VITALS — DIASTOLIC BLOOD PRESSURE: 99 MMHG | SYSTOLIC BLOOD PRESSURE: 164 MMHG

## 2018-02-12 VITALS — DIASTOLIC BLOOD PRESSURE: 94 MMHG | SYSTOLIC BLOOD PRESSURE: 123 MMHG

## 2018-02-12 VITALS — SYSTOLIC BLOOD PRESSURE: 135 MMHG | DIASTOLIC BLOOD PRESSURE: 105 MMHG

## 2018-02-12 VITALS — DIASTOLIC BLOOD PRESSURE: 94 MMHG | SYSTOLIC BLOOD PRESSURE: 170 MMHG

## 2018-02-12 VITALS — DIASTOLIC BLOOD PRESSURE: 95 MMHG | SYSTOLIC BLOOD PRESSURE: 162 MMHG

## 2018-02-12 VITALS — SYSTOLIC BLOOD PRESSURE: 138 MMHG | DIASTOLIC BLOOD PRESSURE: 99 MMHG

## 2018-02-12 VITALS — SYSTOLIC BLOOD PRESSURE: 134 MMHG | DIASTOLIC BLOOD PRESSURE: 109 MMHG

## 2018-02-12 VITALS — DIASTOLIC BLOOD PRESSURE: 87 MMHG | SYSTOLIC BLOOD PRESSURE: 145 MMHG

## 2018-02-12 VITALS — DIASTOLIC BLOOD PRESSURE: 82 MMHG | SYSTOLIC BLOOD PRESSURE: 126 MMHG

## 2018-02-12 VITALS — SYSTOLIC BLOOD PRESSURE: 118 MMHG | DIASTOLIC BLOOD PRESSURE: 81 MMHG

## 2018-02-12 LAB
BASOPHILS # BLD AUTO: 0 10^3/UL (ref 0–0.1)
BASOPHILS NFR BLD AUTO: 0 % (ref 0–10)
BUN/CREAT SERPL: 21
CALCIUM SERPL-MCNC: 9.1 MG/DL (ref 8.5–10.1)
CHLORIDE SERPL-SCNC: 97 MMOL/L (ref 98–107)
CO2 SERPL-SCNC: 31 MMOL/L (ref 21–32)
CREAT SERPL-MCNC: 0.71 MG/DL (ref 0.6–1.3)
EOSINOPHIL # BLD AUTO: 0 10^3/UL (ref 0–0.3)
EOSINOPHIL NFR BLD AUTO: 0 % (ref 0–10)
ERYTHROCYTE [DISTWIDTH] IN BLOOD BY AUTOMATED COUNT: 14 % (ref 10–14.5)
GFR SERPLBLD BASED ON 1.73 SQ M-ARVRAT: > 60 ML/MIN
GLUCOSE SERPL-MCNC: 133 MG/DL (ref 70–105)
HCT VFR BLD CALC: 41 % (ref 35–52)
HGB BLD-MCNC: 13.4 G/DL (ref 11.5–16)
LYMPHOCYTES # BLD AUTO: 0.5 X 10^3 (ref 1–4)
LYMPHOCYTES NFR BLD AUTO: 5 % (ref 12–44)
MAGNESIUM SERPL-MCNC: 2.6 MG/DL (ref 1.8–2.4)
MANUAL DIFFERENTIAL PERFORMED BLD QL: NO
MCH RBC QN AUTO: 30 PG (ref 25–34)
MCHC RBC AUTO-ENTMCNC: 33 G/DL (ref 32–36)
MCV RBC AUTO: 91 FL (ref 80–99)
MONOCYTES # BLD AUTO: 0.5 X 10^3 (ref 0–1)
MONOCYTES NFR BLD AUTO: 5 % (ref 0–12)
NEUTROPHILS # BLD AUTO: 8.1 X 10^3 (ref 1.8–7.8)
NEUTROPHILS NFR BLD AUTO: 89 % (ref 42–75)
PHOSPHATE SERPL-MCNC: 2 MG/DL (ref 2.3–4.7)
PLATELET # BLD: 227 10^3/UL (ref 130–400)
PMV BLD AUTO: 8.8 FL (ref 7.4–10.4)
POTASSIUM SERPL-SCNC: 3.9 MMOL/L (ref 3.6–5)
RBC # BLD AUTO: 4.49 10^6/UL (ref 4.35–5.85)
SODIUM SERPL-SCNC: 138 MMOL/L (ref 135–145)
WBC # BLD AUTO: 9 10^3/UL (ref 4.3–11)

## 2018-02-12 RX ADMIN — ALBUTEROL SULFATE SCH PUFF: 90 AEROSOL, METERED RESPIRATORY (INHALATION) at 08:23

## 2018-02-12 RX ADMIN — ENOXAPARIN SODIUM SCH MG: 100 INJECTION SUBCUTANEOUS at 12:20

## 2018-02-12 RX ADMIN — ALBUTEROL SULFATE SCH PUFF: 90 AEROSOL, METERED RESPIRATORY (INHALATION) at 14:16

## 2018-02-12 RX ADMIN — MAGNESIUM SULFATE IN DEXTROSE SCH MLS/HR: 10 INJECTION, SOLUTION INTRAVENOUS at 05:48

## 2018-02-12 RX ADMIN — TRAZODONE HYDROCHLORIDE SCH MG: 50 TABLET ORAL at 20:42

## 2018-02-12 RX ADMIN — METHYLPREDNISOLONE SODIUM SUCCINATE SCH MG: 40 INJECTION, POWDER, FOR SOLUTION INTRAMUSCULAR; INTRAVENOUS at 17:57

## 2018-02-12 RX ADMIN — HYDROCHLOROTHIAZIDE SCH MG: 25 TABLET ORAL at 09:59

## 2018-02-12 RX ADMIN — METHYLPREDNISOLONE SODIUM SUCCINATE SCH MG: 40 INJECTION, POWDER, FOR SOLUTION INTRAMUSCULAR; INTRAVENOUS at 06:02

## 2018-02-12 RX ADMIN — RISPERIDONE SCH MG: 1 TABLET, FILM COATED ORAL at 10:00

## 2018-02-12 RX ADMIN — RISPERIDONE SCH MG: 1 TABLET, FILM COATED ORAL at 20:42

## 2018-02-12 RX ADMIN — ALBUTEROL SULFATE SCH PUFF: 90 AEROSOL, METERED RESPIRATORY (INHALATION) at 02:39

## 2018-02-12 RX ADMIN — SODIUM CHLORIDE SCH MLS/HR: 900 INJECTION, SOLUTION INTRAVENOUS at 00:53

## 2018-02-12 RX ADMIN — ALBUTEROL SULFATE SCH PUFF: 90 AEROSOL, METERED RESPIRATORY (INHALATION) at 12:38

## 2018-02-12 RX ADMIN — METHYLPREDNISOLONE SODIUM SUCCINATE SCH MG: 40 INJECTION, POWDER, FOR SOLUTION INTRAMUSCULAR; INTRAVENOUS at 00:53

## 2018-02-12 RX ADMIN — MORPHINE SULFATE PRN MG: 4 INJECTION, SOLUTION INTRAMUSCULAR; INTRAVENOUS at 05:57

## 2018-02-12 RX ADMIN — BENZONATATE PRN MG: 100 CAPSULE ORAL at 06:02

## 2018-02-12 RX ADMIN — Medication SCH ML: at 01:06

## 2018-02-12 RX ADMIN — ALBUTEROL SULFATE SCH PUFF: 90 AEROSOL, METERED RESPIRATORY (INHALATION) at 00:35

## 2018-02-12 RX ADMIN — LORAZEPAM PRN MG: 1 TABLET ORAL at 06:02

## 2018-02-12 RX ADMIN — Medication SCH ML: at 20:43

## 2018-02-12 RX ADMIN — BENZONATATE PRN MG: 100 CAPSULE ORAL at 20:42

## 2018-02-12 RX ADMIN — AMITRIPTYLINE HYDROCHLORIDE SCH MG: 25 TABLET, FILM COATED ORAL at 10:00

## 2018-02-12 RX ADMIN — POTASSIUM CHLORIDE SCH MLS/HR: 200 INJECTION, SOLUTION INTRAVENOUS at 05:48

## 2018-02-12 RX ADMIN — METHYLPREDNISOLONE SODIUM SUCCINATE SCH MG: 40 INJECTION, POWDER, FOR SOLUTION INTRAMUSCULAR; INTRAVENOUS at 12:14

## 2018-02-12 RX ADMIN — POTASSIUM CHLORIDE SCH MEQ: 1500 TABLET, EXTENDED RELEASE ORAL at 05:48

## 2018-02-12 RX ADMIN — UMECLIDINIUM SCH INH: 62.5 AEROSOL, POWDER ORAL at 06:36

## 2018-02-12 RX ADMIN — LORAZEPAM PRN MG: 1 TABLET ORAL at 20:42

## 2018-02-12 RX ADMIN — ALBUTEROL SULFATE SCH PUFF: 90 AEROSOL, METERED RESPIRATORY (INHALATION) at 04:08

## 2018-02-12 RX ADMIN — GUAIFENESIN AND DEXTROMETHORPHAN PRN ML: 100; 10 SYRUP ORAL at 06:02

## 2018-02-12 RX ADMIN — Medication SCH ML: at 12:20

## 2018-02-12 RX ADMIN — ALBUTEROL SULFATE SCH PUFF: 90 AEROSOL, METERED RESPIRATORY (INHALATION) at 17:23

## 2018-02-12 RX ADMIN — ALBUTEROL SULFATE SCH PUFF: 90 AEROSOL, METERED RESPIRATORY (INHALATION) at 06:36

## 2018-02-12 RX ADMIN — AZITHROMYCIN SCH MG: 250 TABLET, FILM COATED ORAL at 10:00

## 2018-02-12 RX ADMIN — MORPHINE SULFATE PRN MG: 4 INJECTION, SOLUTION INTRAMUSCULAR; INTRAVENOUS at 20:43

## 2018-02-12 RX ADMIN — GUAIFENESIN AND DEXTROMETHORPHAN PRN ML: 100; 10 SYRUP ORAL at 20:42

## 2018-02-12 RX ADMIN — ALBUTEROL SULFATE SCH PUFF: 90 AEROSOL, METERED RESPIRATORY (INHALATION) at 10:53

## 2018-02-12 NOTE — DIAGNOSTIC IMAGING REPORT
INDICATION: COPD exacerbation. Sepsis.



COMPARISON: 02/11/2018



FINDINGS: Single frontal view of the chest demonstrates normal

heart size and pulmonary vascularity. The lungs hyperinflated,

but are otherwise clear. No large pleural effusion or

pneumothorax is seen. The visualized osseous structures show no

acute abnormalities.



IMPRESSION: 

1. No acute cardiopulmonary process.  

2. Background of COPD.



Dictated by: 



  Dictated on workstation # [id number]

## 2018-02-12 NOTE — PULMONARY PROGRESS NOTE
Subjective


Time Seen by Provider:  05:19


Subjective/Events-last exam


Pt is resting comfortably on 30% oxygen via high flow Vapotherm.





Exam


Exam





Vital Signs








  Date Time  Temp Pulse Resp B/P (MAP) Pulse Ox O2 Delivery O2 Flow Rate FiO2


 


18 04:08      Vapotherm 15.00 30


 


18 04:00  73 13 162/96 (118) 100 Vapotherm 30.00 





       15.00 


 


18 04:00      Vapotherm 15.00 30


 


18 03:00  86 11 148/80 (102) 100 Vapotherm 30.00 





       15.00 


 


18 02:40      Vapotherm 15.00 30


 


18 02:00  83 15 158/87 (110) 100 Vapotherm 30.00 





       15.00 


 


18 01:00  87 11 131/85 (100) 100 Vapotherm 30.00 





       15.00 


 


18 01:00  87      


 


18 00:35      Vapotherm 15.00 30


 


18 00:00      Vapotherm 15.00 30


 


18 00:00  86 13 129/92 (104) 100 Vapotherm 30.00 





       15.00 


 


18 23:00  91 21 158/83 (108) 100 Vapotherm 30.00 





       15.00 


 


18 22:47      Vapotherm 15.00 30


 


18 22:29  90 26 169/100 (123) 100 Vapotherm 30.00 





       15.00 


 


18 22:00  93 22 173/100 (124) 100 Vapotherm 30.00 





       15.00 


 


18 21:00  101 27 155/96 (115) 100 Vapotherm 30.00 





       15.00 


 


18 20:35      Vapotherm 15.00 30


 


18 20:00  108 22 158/96 (116) 100 Vapotherm 30.00 





       15.00 


 


18 20:00      Vapotherm 15.00 30


 


18 19:00 96.0     Vapotherm 30.00 





       15.00 


 


18 19:00  102      


 


18 19:00  102 17 161/99 (119) 100 Vapotherm 30.00 





       15.00 


 


18 18:58      Vapotherm 15.00 30


 


18 18:00  100 17 164/102 (122) 100 NIV Bilevel 30.00 


 


18 17:00  98 18 164/94 (117) 100 NIV Bilevel 30.00 


 


18 16:20 97.3     NIV Bilevel 30.00 


 


18 16:00      NIV Bilevel 30.00 


 


18 16:00      Vapotherm 15.00 30


 


18 15:00  108 16 163/115 (131) 97 NIV Bilevel 30.00 


 


18 14:24      Vapotherm 15.00 30


 


18 14:00  111 27 164/94 (117) 100 NIV Bilevel 30.00 


 


18 13:00  103 16 156/118 (131) 96 NIV Bilevel 30.00 


 


18 13:00  103      


 


18 12:47     93 Vapotherm 15.00 30


 


18 12:00  89 11 135/83 (100) 100 NIV Bilevel 30.00 


 


18 11:27      NIV Bilevel  30


 


18 11:27 96.5     NIV Bilevel 30.00 


 


18 11:00  93 12 136/80 (98) 99 NIV Bilevel 30.00 


 


18 10:46  103 28  100  30.00 


 


18 10:00  111 13 134/84 (101) 100 NIV Bilevel 30.00 


 


18 09:00  112 22 142/94 (110) 100 Vapotherm 30.00 





       15.00 


 


18 08:03  92 16 154/96 (115) 100 Vapotherm 30.00 





       15.00 


 


18 07:45      Vapotherm 15.00 30


 


18 07:41 97.6 89 12 142/86 (104) 100 Vapotherm 30.00 





       15.00 


 


18 07:00  92 15 155/117 (130) 100 Vapotherm 30.00 





       15.00 


 


18 07:00  92      


 


18 06:52     100 Vapotherm 15.00 30


 


18 06:20  105 21  97 Vapotherm 30.00 





       15.00 


 


18 06:00  78 10 130/86 (101) 97 NIV Bilevel 30.00 














I & O 


 


 18





 07:00


 


Intake Total 1180 ml


 


Output Total 3625 ml


 


Balance -2445 ml








General Appearance:  No Apparent Distress, WD/WN


HEENT:  Moist Mucous Membranes


Neck:  Normal Inspection, Non Tender, Supple


Respiratory:  No Accessory Muscle Use, No Respiratory Distress, Decreased 

Breath Sounds


Cardiovascular:  Regular Rate, Rhythm


Capillary Refill:  Less Than 3 Seconds


Gastrointestinal:  soft


Extremity:  No Pedal Edema


Neurologic/Psychiatric:  Alert, Oriented x3


Skin:  Normal Color, Warm/Dry


Lymphatic:  No Adenopathy





Results


Lab


Laboratory Tests


18 03:13








18 04:30











Assessment/Plan


Assessment/Plan


Acute on Chronic respiratory failure


   -Noninvasive ventilation


   -oxygen 


Panic attacks/psychosis


   -Risperdal BID  0.5mg BID 


      -Increase to 1mg BID 


   -Ativan PRN 


   -PT does not need to be intubated for panic attacks. 


COPDAE/AsthmaAE 


   -SVNS  Q2 


   -Solumedrol 40 IV Q 6 





Anxiety 


   -Continue home Ativan, morphine 








Pt is having episodes of acute respiratory distress however she is only 

requiring 30% oxygen. Pt does not desaturate with these panic attacks. CXR just 

shows COPD. ABG shows hyperventilation.  Pt is a DNR/DNI now. 





233





Clinical Quality Measures


DVT/VTE Risk/Contraindication:


Risk Factor Score Per Nursin


RFS Level Per Nursing on Admit:  4+=Very High











JOVON JOAQUIN DO 2018 05:06

## 2018-02-12 NOTE — PROGRESS NOTE (SOAP)
Subjective


Date Seen by Provider:  2018


Time Seen by Provider:  07:25


Subjective/Events-last exam


Today I spoke with patient regarding her status over the weekend.  It is the 

patient and family's wish that if she should required to be intubated she has 

opted not for this and she is now a DO NOT RESUSCITATE/DO NOT INTUBATE.  

Currently patient is resting comfortably without any obvious signs of distress 

respiratory wise.





Objective


Exam





Vital Signs








  Date Time  Temp Pulse Resp B/P (MAP) Pulse Ox O2 Delivery O2 Flow Rate FiO2


 


18 06:40  90 16  100 Vapotherm 30.00 





       10.00 


 


18 06:36     100 Vapotherm 15.00 30


 


18 06:00  110 13 162/95 (117) 100 Vapotherm 30.00 





       15.00 


 


18 05:00  89 13 138/69 (92) 100 Vapotherm 30.00 





       15.00 


 


18 04:08      Vapotherm 15.00 30


 


18 04:00  73 13 162/96 (118) 100 Vapotherm 30.00 





       15.00 


 


18 04:00      Vapotherm 15.00 30


 


18 03:00  86 11 148/80 (102) 100 Vapotherm 30.00 





       15.00 


 


18 02:40      Vapotherm 15.00 30


 


18 02:00  83 15 158/87 (110) 100 Vapotherm 30.00 





       15.00 


 


18 01:00  87 11 131/85 (100) 100 Vapotherm 30.00 





       15.00 


 


18 01:00  87      


 


18 00:35      Vapotherm 15.00 30


 


18 00:00      Vapotherm 15.00 30


 


18 00:00  86 13 129/92 (104) 100 Vapotherm 30.00 





       15.00 


 


18 23:00  91 21 158/83 (108) 100 Vapotherm 30.00 





       15.00 


 


18 22:47      Vapotherm 15.00 30


 


18 22:29  90 26 169/100 (123) 100 Vapotherm 30.00 





       15.00 


 


18 22:00  93 22 173/100 (124) 100 Vapotherm 30.00 





       15.00 


 


18 21:00  101 27 155/96 (115) 100 Vapotherm 30.00 





       15.00 


 


18 20:35      Vapotherm 15.00 30


 


18 20:00  108 22 158/96 (116) 100 Vapotherm 30.00 





       15.00 


 


18 20:00      Vapotherm 15.00 30


 


18 19:00 96.0     Vapotherm 30.00 





       15.00 


 


18 19:00  102      


 


18 19:00  102 17 161/99 (119) 100 Vapotherm 30.00 





       15.00 


 


18 18:58      Vapotherm 15.00 30


 


18 18:00  100 17 164/102 (122) 100 NIV Bilevel 30.00 


 


18 17:00  98 18 164/94 (117) 100 NIV Bilevel 30.00 


 


18 16:20 97.3     NIV Bilevel 30.00 


 


18 16:00      NIV Bilevel 30.00 


 


18 16:00      Vapotherm 15.00 30


 


18 15:00  108 16 163/115 (131) 97 NIV Bilevel 30.00 


 


18 14:24      Vapotherm 15.00 30


 


18 14:00  111 27 164/94 (117) 100 NIV Bilevel 30.00 


 


18 13:00  103 16 156/118 (131) 96 NIV Bilevel 30.00 


 


18 13:00  103      


 


18 12:47     93 Vapotherm 15.00 30


 


18 12:00  89 11 135/83 (100) 100 NIV Bilevel 30.00 


 


18 11:27      NIV Bilevel  30


 


18 11:27 96.5     NIV Bilevel 30.00 


 


18 11:00  93 12 136/80 (98) 99 NIV Bilevel 30.00 


 


18 10:46  103 28  100  30.00 


 


18 10:00  111 13 134/84 (101) 100 NIV Bilevel 30.00 


 


18 09:00  112 22 142/94 (110) 100 Vapotherm 30.00 





       15.00 


 


18 08:03  92 16 154/96 (115) 100 Vapotherm 30.00 





       15.00 


 


18 07:45      Vapotherm 15.00 30


 


18 07:41 97.6 89 12 142/86 (104) 100 Vapotherm 30.00 





       15.00 














I & O 


 


 18





 07:00


 


Intake Total 1280 ml


 


Output Total 4425 ml


 


Balance -3145 ml





Capillary Refill : Less Than 3 Seconds


General Appearance:  No Apparent Distress


Respiratory:  Lungs Clear, Decreased Breath Sounds


Cardiovascular:  Regular Rate, Rhythm


Gastrointestinal:  soft


Skin:  Normal Color





Results


Lab


Laboratory Tests


18 17:34: 


Blood Gas Puncture Site RIGHT RADIAL, Blood Gas Patient Temperature 97.1, 

Arterial Blood pH 7.51H, Arterial Blood Partial Pressure CO2 42, Arterial Blood 

Partial Pressure O2 63L, Arterial Blood HCO3 34H, Arterial Blood Total CO2 35.1H

, Arterial Blood Oxygen Saturation 95, Arterial Blood Base Excess 10.0H, Hermes 

Test POSITIVE, Blood Gas Ventilator Setting YES, Blood Gas Inspired Oxygen 30%


18 04:30: 


White Blood Count 9.0, Red Blood Count 4.49, Hemoglobin 13.4, Hematocrit 41, 

Mean Corpuscular Volume 91, Mean Corpuscular Hemoglobin 30, Mean Corpuscular 

Hemoglobin Concent 33, Red Cell Distribution Width 14.0, Platelet Count 227, 

Mean Platelet Volume 8.8, Neutrophils (%) (Auto) 89H, Lymphocytes (%) (Auto) 5L

, Monocytes (%) (Auto) 5, Eosinophils (%) (Auto) 0, Basophils (%) (Auto) 0, 

Neutrophils # (Auto) 8.1H, Lymphocytes # (Auto) 0.5L, Monocytes # (Auto) 0.5, 

Eosinophils # (Auto) 0.0, Basophils # (Auto) 0.0, Sodium Level 138, Potassium 

Level 3.9, Chloride Level 97L, Carbon Dioxide Level 31, Anion Gap 10, Blood 

Urea Nitrogen 15, Creatinine 0.71, Estimat Glomerular Filtration Rate > 60, BUN/

Creatinine Ratio 21, Glucose Level 133H, Calcium Level 9.1, Phosphorus Level 

2.0L, Magnesium Level 2.6H





Microbiology


18 Blood Culture - Preliminary, Resulted


         No growth


18 Influenza Types A,B Antigen (SOLIS) - Final, Complete





Assessment/Plan


Assessment/Plan


Assess & Plan/Chief Complaint


1. COPD exacerbation


-patient admitted to intensive care  unit


-She is currently on BiPAP


-Pulmonary consultation





-solu-Medrol decreased to 40 mg intravenous every 6 hours.


-patient is currently on nasal cannula oxygen


-possible move to medical floor this afternoon





-continue with Solumedrol at 40mg q6hr


-when improved to [facility] medical


2/10- requiring persistent bipap with minimal improvement, continue solumedrol 

at 40 q6, albuterol q2 and umeclidinium, she does state she would want 

intubation if needed


- continues to decline in spite of steroids and breathing treatment and 

antibiotics, discussed with Dr. Jackman and agree at this time she is nearing 

need for intubation, discussed with patient and she is undecided as to whether 

she wants to be intubated or pursue comfort goals. She is going to discuss with 

her family and let us know.





-at this point patient will remain in the ICU due to her episodic respiratory 

distress that still occurs.  Patient does appear to be having a panic attack at 

the time and she is now with increased Risperdal to 1 mg twice a day.


-her Solu-Medrol continues at 40 mg every 6 hours.


-she is resting comfortably on 30 percent oxygen via high flow Vapotherm





2.  Acute respiratory distress secondary to #1





3.  Hypoxemia





4.  Bronchitis vs early pneumonia


-she will be started on Rocephin 1 g daily as well as Zithromax


2/10- continue azithromycin and ceftriaxone





-ceftriaxone and generic Zithromax continues intravenously





5. DVT ppx- enoxaparin





Clinical Quality Measures


DVT/VTE Risk/Contraindication:


Risk Factor Score Per Nursin


RFS Level Per Nursing on Admit:  4+=Very High











NINA HENLEY MD 2018 07:39

## 2018-02-13 VITALS — SYSTOLIC BLOOD PRESSURE: 134 MMHG | DIASTOLIC BLOOD PRESSURE: 81 MMHG

## 2018-02-13 VITALS — SYSTOLIC BLOOD PRESSURE: 107 MMHG | DIASTOLIC BLOOD PRESSURE: 95 MMHG

## 2018-02-13 VITALS — SYSTOLIC BLOOD PRESSURE: 131 MMHG | DIASTOLIC BLOOD PRESSURE: 79 MMHG

## 2018-02-13 VITALS — SYSTOLIC BLOOD PRESSURE: 109 MMHG | DIASTOLIC BLOOD PRESSURE: 89 MMHG

## 2018-02-13 VITALS — SYSTOLIC BLOOD PRESSURE: 116 MMHG | DIASTOLIC BLOOD PRESSURE: 73 MMHG

## 2018-02-13 VITALS — DIASTOLIC BLOOD PRESSURE: 85 MMHG | SYSTOLIC BLOOD PRESSURE: 123 MMHG

## 2018-02-13 VITALS — DIASTOLIC BLOOD PRESSURE: 66 MMHG | SYSTOLIC BLOOD PRESSURE: 114 MMHG

## 2018-02-13 VITALS — SYSTOLIC BLOOD PRESSURE: 109 MMHG | DIASTOLIC BLOOD PRESSURE: 88 MMHG

## 2018-02-13 VITALS — DIASTOLIC BLOOD PRESSURE: 88 MMHG | SYSTOLIC BLOOD PRESSURE: 106 MMHG

## 2018-02-13 VITALS — SYSTOLIC BLOOD PRESSURE: 116 MMHG | DIASTOLIC BLOOD PRESSURE: 81 MMHG

## 2018-02-13 VITALS — SYSTOLIC BLOOD PRESSURE: 157 MMHG | DIASTOLIC BLOOD PRESSURE: 110 MMHG

## 2018-02-13 VITALS — DIASTOLIC BLOOD PRESSURE: 84 MMHG | SYSTOLIC BLOOD PRESSURE: 100 MMHG

## 2018-02-13 VITALS — SYSTOLIC BLOOD PRESSURE: 123 MMHG | DIASTOLIC BLOOD PRESSURE: 98 MMHG

## 2018-02-13 VITALS — DIASTOLIC BLOOD PRESSURE: 92 MMHG | SYSTOLIC BLOOD PRESSURE: 142 MMHG

## 2018-02-13 VITALS — SYSTOLIC BLOOD PRESSURE: 104 MMHG | DIASTOLIC BLOOD PRESSURE: 79 MMHG

## 2018-02-13 VITALS — SYSTOLIC BLOOD PRESSURE: 129 MMHG | DIASTOLIC BLOOD PRESSURE: 81 MMHG

## 2018-02-13 VITALS — SYSTOLIC BLOOD PRESSURE: 98 MMHG | DIASTOLIC BLOOD PRESSURE: 66 MMHG

## 2018-02-13 VITALS — SYSTOLIC BLOOD PRESSURE: 100 MMHG | DIASTOLIC BLOOD PRESSURE: 70 MMHG

## 2018-02-13 VITALS — SYSTOLIC BLOOD PRESSURE: 116 MMHG | DIASTOLIC BLOOD PRESSURE: 74 MMHG

## 2018-02-13 LAB
BASOPHILS # BLD AUTO: 0 10^3/UL (ref 0–0.1)
BASOPHILS NFR BLD AUTO: 0 % (ref 0–10)
BUN/CREAT SERPL: 25
CALCIUM SERPL-MCNC: 9.1 MG/DL (ref 8.5–10.1)
CHLORIDE SERPL-SCNC: 94 MMOL/L (ref 98–107)
CO2 SERPL-SCNC: 29 MMOL/L (ref 21–32)
CREAT SERPL-MCNC: 0.72 MG/DL (ref 0.6–1.3)
EOSINOPHIL # BLD AUTO: 0 10^3/UL (ref 0–0.3)
EOSINOPHIL NFR BLD AUTO: 0 % (ref 0–10)
ERYTHROCYTE [DISTWIDTH] IN BLOOD BY AUTOMATED COUNT: 14 % (ref 10–14.5)
GFR SERPLBLD BASED ON 1.73 SQ M-ARVRAT: > 60 ML/MIN
GLUCOSE SERPL-MCNC: 141 MG/DL (ref 70–105)
HCT VFR BLD CALC: 42 % (ref 35–52)
HGB BLD-MCNC: 13.6 G/DL (ref 11.5–16)
LYMPHOCYTES # BLD AUTO: 0.4 X 10^3 (ref 1–4)
LYMPHOCYTES NFR BLD AUTO: 5 % (ref 12–44)
MAGNESIUM SERPL-MCNC: 2.3 MG/DL (ref 1.8–2.4)
MANUAL DIFFERENTIAL PERFORMED BLD QL: NO
MCH RBC QN AUTO: 29 PG (ref 25–34)
MCHC RBC AUTO-ENTMCNC: 33 G/DL (ref 32–36)
MCV RBC AUTO: 89 FL (ref 80–99)
MONOCYTES # BLD AUTO: 0.4 X 10^3 (ref 0–1)
MONOCYTES NFR BLD AUTO: 4 % (ref 0–12)
NEUTROPHILS # BLD AUTO: 8.2 X 10^3 (ref 1.8–7.8)
NEUTROPHILS NFR BLD AUTO: 91 % (ref 42–75)
PHOSPHATE SERPL-MCNC: 2.7 MG/DL (ref 2.3–4.7)
PLATELET # BLD: 248 10^3/UL (ref 130–400)
PMV BLD AUTO: 9 FL (ref 7.4–10.4)
POTASSIUM SERPL-SCNC: 3.5 MMOL/L (ref 3.6–5)
RBC # BLD AUTO: 4.64 10^6/UL (ref 4.35–5.85)
SODIUM SERPL-SCNC: 138 MMOL/L (ref 135–145)
WBC # BLD AUTO: 9 10^3/UL (ref 4.3–11)

## 2018-02-13 RX ADMIN — AZITHROMYCIN SCH MG: 250 TABLET, FILM COATED ORAL at 08:29

## 2018-02-13 RX ADMIN — ALBUTEROL SULFATE SCH PUFF: 90 AEROSOL, METERED RESPIRATORY (INHALATION) at 08:32

## 2018-02-13 RX ADMIN — RISPERIDONE SCH MG: 1 TABLET, FILM COATED ORAL at 21:06

## 2018-02-13 RX ADMIN — ALBUTEROL SULFATE SCH PUFF: 90 AEROSOL, METERED RESPIRATORY (INHALATION) at 16:33

## 2018-02-13 RX ADMIN — RISPERIDONE SCH MG: 1 TABLET, FILM COATED ORAL at 08:30

## 2018-02-13 RX ADMIN — LORAZEPAM PRN MG: 1 TABLET ORAL at 08:29

## 2018-02-13 RX ADMIN — HYDROCHLOROTHIAZIDE SCH MG: 25 TABLET ORAL at 08:30

## 2018-02-13 RX ADMIN — UMECLIDINIUM SCH INH: 62.5 AEROSOL, POWDER ORAL at 06:54

## 2018-02-13 RX ADMIN — MAGNESIUM SULFATE IN DEXTROSE SCH MLS/HR: 10 INJECTION, SOLUTION INTRAVENOUS at 04:30

## 2018-02-13 RX ADMIN — ALBUTEROL SULFATE SCH PUFF: 90 AEROSOL, METERED RESPIRATORY (INHALATION) at 10:35

## 2018-02-13 RX ADMIN — ALBUTEROL SULFATE SCH PUFF: 90 AEROSOL, METERED RESPIRATORY (INHALATION) at 06:53

## 2018-02-13 RX ADMIN — POTASSIUM CHLORIDE SCH MLS/HR: 200 INJECTION, SOLUTION INTRAVENOUS at 04:30

## 2018-02-13 RX ADMIN — TRAZODONE HYDROCHLORIDE SCH MG: 50 TABLET ORAL at 21:06

## 2018-02-13 RX ADMIN — ALBUTEROL SULFATE SCH PUFF: 90 AEROSOL, METERED RESPIRATORY (INHALATION) at 12:16

## 2018-02-13 RX ADMIN — AMITRIPTYLINE HYDROCHLORIDE SCH MG: 25 TABLET, FILM COATED ORAL at 08:30

## 2018-02-13 RX ADMIN — ENOXAPARIN SODIUM SCH MG: 100 INJECTION SUBCUTANEOUS at 13:25

## 2018-02-13 RX ADMIN — MORPHINE SULFATE PRN MG: 4 INJECTION, SOLUTION INTRAMUSCULAR; INTRAVENOUS at 03:34

## 2018-02-13 RX ADMIN — Medication SCH ML: at 06:42

## 2018-02-13 RX ADMIN — ALBUTEROL SULFATE SCH GM: 90 AEROSOL, METERED RESPIRATORY (INHALATION) at 18:04

## 2018-02-13 RX ADMIN — Medication SCH ML: at 21:06

## 2018-02-13 RX ADMIN — ALBUTEROL SULFATE SCH PUFF: 90 AEROSOL, METERED RESPIRATORY (INHALATION) at 14:39

## 2018-02-13 RX ADMIN — METHYLPREDNISOLONE SODIUM SUCCINATE SCH MG: 40 INJECTION, POWDER, FOR SOLUTION INTRAMUSCULAR; INTRAVENOUS at 00:20

## 2018-02-13 RX ADMIN — ALBUTEROL SULFATE SCH GM: 90 AEROSOL, METERED RESPIRATORY (INHALATION) at 22:15

## 2018-02-13 RX ADMIN — POTASSIUM CHLORIDE SCH MEQ: 1500 TABLET, EXTENDED RELEASE ORAL at 04:32

## 2018-02-13 RX ADMIN — Medication SCH ML: at 13:26

## 2018-02-13 NOTE — PROGRESS NOTE (SOAP)
Subjective


Date Seen by Provider:  2018


Time Seen by Provider:  07:25


Subjective/Events-last exam


Patient is doing much better regarding her breathing.  She is setting upright 

in bed without any respiratory distress.  She is currently on nasal cannula 

oxygen.





Objective


Exam





Vital Signs








  Date Time  Temp Pulse Resp B/P (MAP) Pulse Ox O2 Delivery O2 Flow Rate FiO2


 


18 06:53     100 Nasal Cannula 3.00 


 


18 06:00  92 14 157/110 (126) 100 High Flow N/C 3.00 


 


18 05:00  94 9 100/70 (80) 100 High Flow N/C 3.00 


 


18 04:36     100 Nasal Cannula 3.00 


 


18 04:00      High Flow N/C 3.00 


 


18 04:00  98 9 114/66 (82) 100 High Flow N/C 3.00 


 


18 03:40 98.7       


 


18 03:00  84 11 129/81 (97) 100 High Flow N/C 3.00 


 


18 02:55     100 Nasal Cannula 3.00 


 


18 02:00  98 11 131/79 (96) 100 High Flow N/C 3.00 


 


18 01:00  98      


 


18 01:00  98 12 134/81 (98) 100 High Flow N/C 3.00 


 


18 00:47     100 Nasal Cannula 3.00 


 


18 00:00  84 10 116/73 (87) 100 High Flow N/C 3.00 


 


18 00:00      High Flow N/C 3.00 


 


18 23:00  98 17 113/75 (88) 100 High Flow N/C 3.00 


 


18 22:44     100 Nasal Cannula 3.00 


 


18 22:32  93 18 138/99 (112) 100 High Flow N/C 3.00 


 


18 22:00  103 13 145/110 (122) 100 High Flow N/C 3.00 


 


18 21:00  100 22 147/97 (114) 100 High Flow N/C 3.00 


 


18 20:01     100 Nasal Cannula 3.00 


 


18 20:00      High Flow N/C 3.00 


 


18 20:00  102 30 164/99 (120) 100 High Flow N/C 3.00 


 


18 19:56 96.9     High Flow N/C 3.00 


 


18 19:00  108 27 134/109 (117) 100 High Flow N/C 3.00 


 


18 19:00  108      


 


18 18:00  112 26 118/81 (93) 100 High Flow N/C 3.00 


 


18 17:59      High Flow N/C 3.00 


 


18 17:23     100 Nasal Cannula 3.00 


 


18 17:00  111 20 142/86 (104) 100 High Flow N/C 6.00 


 


18 16:15      High Flow N/C 3.00 


 


18 16:15 96.9       


 


18 16:00  120 18 145/94 (111) 100 High Flow N/C 6.00 


 


18 15:00  105 19 135/105 (115) 100 High Flow N/C 6.00 


 


18 14:16     97 Nasal Cannula 3.00 


 


18 14:00  101 16 125/105 (112) 100 High Flow N/C 6.00 


 


18 13:00  108 23 126/89 (101) 100 High Flow N/C 6.00 


 


18 13:00  108      


 


18 12:38     100 Nasal Cannula 3.00 


 


18 12:21 96.3     High Flow N/C 6.00 


 


18 12:20      High Flow N/C 6.00 


 


18 12:00  109 17 123/94 (104) 100 Vapotherm 30.00 





       10.00 


 


18 11:00  116 20 145/87 (106) 100 Vapotherm 30.00 





       10.00 


 


18 10:53     100 Nasal Cannula 6.00 


 


18 10:00  112 20 149/90 (109) 100 Vapotherm 30.00 





       10.00 


 


18 09:00  112 21 156/106 (123) 100 Vapotherm 30.00 





       10.00 


 


18 08:23     100 Vapotherm 10.00 30


 


18 08:10      High Flow N/C 6.00 


 


18 08:10 96.5 98 19 150/102 (118) 100 Vapotherm 30.00 





       10.00 


 


18 08:00  96 20 170/94 (119) 100 Vapotherm 30.00 





       10.00 














I & O 


 


 18





 07:00


 


Intake Total 2090 ml


 


Output Total 1225 ml


 


Balance 865 ml





Capillary Refill : Less Than 3 Seconds


General Appearance:  No Apparent Distress


Neck:  Supple


Respiratory:  Lungs Clear, Decreased Breath Sounds


Cardiovascular:  Regular Rate, Rhythm


Skin:  Normal Color





Results


Lab


Laboratory Tests


18 03:25: 


White Blood Count 9.0, Red Blood Count 4.64, Hemoglobin 13.6, Hematocrit 42, 

Mean Corpuscular Volume 89, Mean Corpuscular Hemoglobin 29, Mean Corpuscular 

Hemoglobin Concent 33, Red Cell Distribution Width 14.0, Platelet Count 248, 

Mean Platelet Volume 9.0, Neutrophils (%) (Auto) 91H, Lymphocytes (%) (Auto) 5L

, Monocytes (%) (Auto) 4, Eosinophils (%) (Auto) 0, Basophils (%) (Auto) 0, 

Neutrophils # (Auto) 8.2H, Lymphocytes # (Auto) 0.4L, Monocytes # (Auto) 0.4, 

Eosinophils # (Auto) 0.0, Basophils # (Auto) 0.0, Sodium Level 138, Potassium 

Level 3.5L, Chloride Level 94L, Carbon Dioxide Level 29, Anion Gap 15H, Blood 

Urea Nitrogen 18, Creatinine 0.72, Estimat Glomerular Filtration Rate > 60, BUN/

Creatinine Ratio 25, Glucose Level 141H, Calcium Level 9.1, Phosphorus Level 2.7

, Magnesium Level 2.3





Microbiology


18 Blood Culture - Preliminary, Resulted


         No growth


18 Influenza Types A,B Antigen (SOLIS) - Final, Complete





Assessment/Plan


Assessment/Plan


Assess & Plan/Chief Complaint


1. COPD exacerbation


-patient admitted to intensive care  unit


-She is currently on BiPAP


-Pulmonary consultation





-solu-Medrol decreased to 40 mg intravenous every 6 hours.


-patient is currently on nasal cannula oxygen


-possible move to medical floor this afternoon





-continue with Solumedrol at 40mg q6hr


-when improved to [address] [place] [address]


2/10- requiring persistent bipap with minimal improvement, continue solumedrol 

at 40 q6, albuterol q2 and umeclidinium, she does state she would want 

intubation if needed


- continues to decline in spite of steroids and breathing treatment and 

antibiotics, discussed with Dr. Jackman and agree at this time she is nearing 

need for intubation, discussed with patient and she is undecided as to whether 

she wants to be intubated or pursue comfort goals. She is going to discuss with 

her family and let us know.





-at this point patient will remain in the ICU due to her episodic respiratory 

distress that still occurs.  Patient does appear to be having a panic attack at 

the time and she is now with increased Risperdal to 1 mg twice a day.


-her Solu-Medrol continues at 40 mg every 6 hours.


-she is resting comfortably on 30 percent oxygen via high flow Vapotherm








-patient off Vapotherm and currently on nasal cannula oxygen without 

respiratory distress


-Transfer patient to [facility]





2.  Acute respiratory distress secondary to #1





3.  Hypoxemia





4.  Bronchitis vs early pneumonia


-she will be started on Rocephin 1 g daily as well as Zithromax


2/10- continue azithromycin and ceftriaxone





-ceftriaxone and generic Zithromax continues intravenously





-continue Rocephin and switch Zithromax to oral





5. DVT ppx- enoxaparin





Clinical Quality Measures


DVT/VTE Risk/Contraindication:


Risk Factor Score Per Nursin


RFS Level Per Nursing on Admit:  4+=Very High











NINA HENLEY MD 2018 07:37

## 2018-02-13 NOTE — PULMONARY PROGRESS NOTE
Subjective


Time Seen by Provider:  06:18


Subjective/Events-last exam


Pt is doing better.





Exam


Exam





Vital Signs








  Date Time  Temp Pulse Resp B/P (MAP) Pulse Ox O2 Delivery O2 Flow Rate FiO2


 


18 06:00  92 14 157/110 (126) 100 High Flow N/C 3.00 


 


18 05:00  94 9 100/70 (80) 100 High Flow N/C 3.00 


 


18 04:36     100 Nasal Cannula 3.00 


 


18 04:00      High Flow N/C 3.00 


 


18 04:00  98 9 114/66 (82) 100 High Flow N/C 3.00 


 


18 03:40 98.7       


 


18 03:00  84 11 129/81 (97) 100 High Flow N/C 3.00 


 


18 02:55     100 Nasal Cannula 3.00 


 


18 02:00  98 11 131/79 (96) 100 High Flow N/C 3.00 


 


18 01:00  98      


 


18 01:00  98 12 134/81 (98) 100 High Flow N/C 3.00 


 


18 00:47     100 Nasal Cannula 3.00 


 


18 00:00  84 10 116/73 (87) 100 High Flow N/C 3.00 


 


18 00:00      High Flow N/C 3.00 


 


18 23:00  98 17 113/75 (88) 100 High Flow N/C 3.00 


 


18 22:44     100 Nasal Cannula 3.00 


 


18 22:32  93 18 138/99 (112) 100 High Flow N/C 3.00 


 


18 22:00  103 13 145/110 (122) 100 High Flow N/C 3.00 


 


18 21:00  100 22 147/97 (114) 100 High Flow N/C 3.00 


 


18 20:01     100 Nasal Cannula 3.00 


 


18 20:00      High Flow N/C 3.00 


 


18 20:00  102 30 164/99 (120) 100 High Flow N/C 3.00 


 


18 19:56 96.9     High Flow N/C 3.00 


 


18 19:00  108 27 134/109 (117) 100 High Flow N/C 3.00 


 


18 19:00  108      


 


18 18:00  112 26 118/81 (93) 100 High Flow N/C 3.00 


 


18 17:59      High Flow N/C 3.00 


 


18 17:23     100 Nasal Cannula 3.00 


 


18 17:00  111 20 142/86 (104) 100 High Flow N/C 6.00 


 


18 16:15      High Flow N/C 3.00 


 


18 16:15 96.9       


 


18 16:00  120 18 145/94 (111) 100 High Flow N/C 6.00 


 


18 15:00  105 19 135/105 (115) 100 High Flow N/C 6.00 


 


18 14:16     97 Nasal Cannula 3.00 


 


18 14:00  101 16 125/105 (112) 100 High Flow N/C 6.00 


 


18 13:00  108 23 126/89 (101) 100 High Flow N/C 6.00 


 


18 13:00  108      


 


18 12:38     100 Nasal Cannula 3.00 


 


18 12:21 96.3     High Flow N/C 6.00 


 


18 12:20      High Flow N/C 6.00 


 


18 12:00  109 17 123/94 (104) 100 Vapotherm 30.00 





       10.00 


 


18 11:00  116 20 145/87 (106) 100 Vapotherm 30.00 





       10.00 


 


18 10:53     100 Nasal Cannula 6.00 


 


18 10:00  112 20 149/90 (109) 100 Vapotherm 30.00 





       10.00 


 


18 09:00  112 21 156/106 (123) 100 Vapotherm 30.00 





       10.00 


 


18 08:23     100 Vapotherm 10.00 30


 


18 08:10      High Flow N/C 6.00 


 


18 08:10 96.5 98 19 150/102 (118) 100 Vapotherm 30.00 





       10.00 


 


18 08:00  96 20 170/94 (119) 100 Vapotherm 30.00 





       10.00 


 


18 07:00  85      


 


18 07:00  85 11 126/82 (97) 100 Vapotherm 30.00 





       10.00 


 


18 06:40  90 16  100 Vapotherm 30.00 





       10.00 


 


18 06:36     100 Vapotherm 15.00 30














I & O 


 


 18





 07:00


 


Intake Total 1890 ml


 


Output Total 1225 ml


 


Balance 665 ml








General Appearance:  No Apparent Distress


HEENT:  Moist Mucous Membranes


Neck:  Normal Inspection, Non Tender, Supple


Respiratory:  Lungs Clear, Decreased Breath Sounds


Cardiovascular:  Regular Rate, Rhythm


Capillary Refill:  Less Than 3 Seconds


Gastrointestinal:  soft


Extremity:  No Pedal Edema


Neurologic/Psychiatric:  Alert, Oriented x3


Skin:  Normal Color


Lymphatic:  No Adenopathy





Results


Lab


Laboratory Tests


18 04:30








18 03:25











Assessment/Plan


Assessment/Plan


Acute on Chronic respiratory failure


   -Noninvasive ventilation PRN 


   -oxygen 


Panic attacks/psychosis


   -Risperdal BID  0.5mg BID 


      -Increase to 1mg BID 


   -Ativan PRN 


COPDAE/AsthmaAE 


   -SVNS  Q2 


   -Solumedrol 40 IV Q 6 -- prednisone taper 





Anxiety 


   -Continue home Ativan, morphine 








Pt is doing much better no panic attacks last night. 





232





Clinical Quality Measures


DVT/VTE Risk/Contraindication:


Risk Factor Score Per Nursin


RFS Level Per Nursing on Admit:  4+=Very High











JOVON JOAQUIN DO 2018 06:15

## 2018-02-13 NOTE — DIAGNOSTIC IMAGING REPORT
INDICATION: 

COPD.



COMPARISON:  

02/12/2018.



FINDINGS: 

The heart size is normal. There is air-trapping, compatible with

COPD. There is no pleural effusion or pneumothorax. The

mediastinum is unremarkable.



IMPRESSION: 

COPD; otherwise, unremarkable.



Dictated by: 



  Dictated on workstation # [id number]

## 2018-02-14 VITALS — DIASTOLIC BLOOD PRESSURE: 84 MMHG | SYSTOLIC BLOOD PRESSURE: 129 MMHG

## 2018-02-14 VITALS — SYSTOLIC BLOOD PRESSURE: 151 MMHG | DIASTOLIC BLOOD PRESSURE: 75 MMHG

## 2018-02-14 VITALS — SYSTOLIC BLOOD PRESSURE: 125 MMHG | DIASTOLIC BLOOD PRESSURE: 80 MMHG

## 2018-02-14 LAB
BASOPHILS # BLD AUTO: 0 10^3/UL (ref 0–0.1)
BASOPHILS NFR BLD AUTO: 0 % (ref 0–10)
BUN/CREAT SERPL: 25
CALCIUM SERPL-MCNC: 9.2 MG/DL (ref 8.5–10.1)
CHLORIDE SERPL-SCNC: 95 MMOL/L (ref 98–107)
CO2 SERPL-SCNC: 31 MMOL/L (ref 21–32)
CREAT SERPL-MCNC: 0.81 MG/DL (ref 0.6–1.3)
EOSINOPHIL # BLD AUTO: 0 10^3/UL (ref 0–0.3)
EOSINOPHIL NFR BLD AUTO: 0 % (ref 0–10)
ERYTHROCYTE [DISTWIDTH] IN BLOOD BY AUTOMATED COUNT: 14 % (ref 10–14.5)
GFR SERPLBLD BASED ON 1.73 SQ M-ARVRAT: > 60 ML/MIN
GLUCOSE SERPL-MCNC: 107 MG/DL (ref 70–105)
HCT VFR BLD CALC: 39 % (ref 35–52)
HGB BLD-MCNC: 13.1 G/DL (ref 11.5–16)
LYMPHOCYTES # BLD AUTO: 1.2 X 10^3 (ref 1–4)
LYMPHOCYTES NFR BLD AUTO: 11 % (ref 12–44)
MANUAL DIFFERENTIAL PERFORMED BLD QL: NO
MCH RBC QN AUTO: 30 PG (ref 25–34)
MCHC RBC AUTO-ENTMCNC: 34 G/DL (ref 32–36)
MCV RBC AUTO: 90 FL (ref 80–99)
MONOCYTES # BLD AUTO: 1 X 10^3 (ref 0–1)
MONOCYTES NFR BLD AUTO: 9 % (ref 0–12)
NEUTROPHILS # BLD AUTO: 9.1 X 10^3 (ref 1.8–7.8)
NEUTROPHILS NFR BLD AUTO: 80 % (ref 42–75)
PLATELET # BLD: 258 10^3/UL (ref 130–400)
PMV BLD AUTO: 8.8 FL (ref 7.4–10.4)
POTASSIUM SERPL-SCNC: 3.4 MMOL/L (ref 3.6–5)
RBC # BLD AUTO: 4.31 10^6/UL (ref 4.35–5.85)
SODIUM SERPL-SCNC: 136 MMOL/L (ref 135–145)
WBC # BLD AUTO: 11.3 10^3/UL (ref 4.3–11)

## 2018-02-14 RX ADMIN — UMECLIDINIUM SCH INH: 62.5 AEROSOL, POWDER ORAL at 06:45

## 2018-02-14 RX ADMIN — RISPERIDONE SCH MG: 1 TABLET, FILM COATED ORAL at 08:11

## 2018-02-14 RX ADMIN — ALBUTEROL SULFATE SCH GM: 90 AEROSOL, METERED RESPIRATORY (INHALATION) at 02:08

## 2018-02-14 RX ADMIN — ALBUTEROL SULFATE SCH PUFF: 90 AEROSOL, METERED RESPIRATORY (INHALATION) at 06:45

## 2018-02-14 RX ADMIN — AMITRIPTYLINE HYDROCHLORIDE SCH MG: 25 TABLET, FILM COATED ORAL at 08:11

## 2018-02-14 RX ADMIN — HYDROCHLOROTHIAZIDE SCH MG: 25 TABLET ORAL at 08:11

## 2018-02-14 RX ADMIN — LORAZEPAM PRN MG: 1 TABLET ORAL at 02:05

## 2018-02-14 RX ADMIN — LORAZEPAM PRN MG: 1 TABLET ORAL at 08:11

## 2018-02-14 RX ADMIN — Medication SCH ML: at 05:56

## 2018-02-14 NOTE — DISCHARGE SUMMARY
Diagnosis/Chief Complaint


Date of Admission


2018 at 16:21


Date of Discharge


2018


Discharge Date:  2018


Admission Diagnosis


Admission Diagnosis


1. COPD exacerbation





2.  Acute respiratory distress secondary to #1





3.  Hypoxemia





4.  Bronchitis vs early pneumonia





Discharge Diagnosis


1.  Acute on chronic respiratory failure





2.  COPDendstage





3.  Anxiety disorder with panic attacks





4.  Hypoxemia--secondary to number 1





5.  Bronchitis





Reason Hospital Visit


62-year-old female presents to via [facility] emergency after having increasing 

shortness of breath  She is with known steroid-dependent COPD aking 10 mg of 

prednisone daily.  EMS reports that she did have a temperature of 101.  She 

does also admit to a dry cough.  She has available at home albuterol at home 

and typically uses this up to 4 times daily.  She was on 4 L by nasal cannula 

when EMS arrived with her at the emergency department.  She has not recently 

been on any antibiotics.  She also does admit to taking the flu shot.





Discharge Summary


Hospital Course


Hospital Course


Patient was admitted to intensive care unit on 2018 after having 

acute respiratory distress.  Patient is with known end-stage COPD.  She was 

initially placed on BiPAP to keep her oxygen saturations in the 90 percentile.  

She was initially placed on Solu-Medrol 125 mg every 8 hours and this was 

changed I pulmonary to 40 mg IV every 6 hours.  Her initial course was that of 

respiratory improvement, however over the weekend she was noted to have decline 

in her respiratory status.  Patient was nearing respiratory support with 

intubation however Vapotherm at 30 percent and relaxing her since she was 

anxious proved to help.  Also during the course of her stay she was kept on IV 

ceftriaxone as well as IV Zithromax.  Her daily chest x-rays do not reveal any 

significant infiltrates.  She also had daily CBC as well as chemistries 

performed in her white count remained stable.  She was given Risperdal 

initially at 0.5 mg twice daily and ultimately increased to 1 mg twice daily 

Patient was transferred to [facility] in the morning of and she 

did fairly well during the remainder of the stay.  She was felt to be at 

baseline respiratory status in the morning of 2018 and felt ready 

for dismissal.


Labs


Laboratory Tests


18 17:34: 


Arterial Blood pH 7.51H, Arterial Blood Partial Pressure O2 63L, Arterial Blood 

HCO3 34H, Arterial Blood Total CO2 35.1H, Arterial Blood Base Excess 10.0H


18 04:30: 


Neutrophils (%) (Auto) 89H, Lymphocytes (%) (Auto) 5L, Neutrophils # (Auto) 8.1H

, Lymphocytes # (Auto) 0.5L, Chloride Level 97L, Glucose Level 133H, Phosphorus 

Level 2.0L, Magnesium Level 2.6H


18 03:25: 


Neutrophils (%) (Auto) 91H, Lymphocytes (%) (Auto) 5L, Neutrophils # (Auto) 8.2H

, Lymphocytes # (Auto) 0.4L, Chloride Level 94L, Glucose Level 141H, Potassium 

Level 3.5L, Anion Gap 15H


18 04:20: 


Neutrophils (%) (Auto) 80H, Lymphocytes (%) (Auto) 11L, Neutrophils # (Auto) 

9.1H, Chloride Level 95L, Glucose Level 107H, Potassium Level 3.4L, White Blood 

Count 11.3H, Red Blood Count 4.31L, Blood Urea Nitrogen 20H





Procedures


None.





Discharge Physical Examination


Allergies:  


Coded Allergies:  


     fluticasone (Unverified  Allergy, Mild, 12/3/09)


     salmeterol (Unverified  Allergy, Mild, 12/3/09)


Vitals & I&Os





Vital Signs








  Date Time  Temp Pulse Resp B/P (MAP) Pulse Ox O2 Delivery O2 Flow Rate FiO2


 


18 06:45     96 Nasal Cannula 3.00 


 


18 04:00 96.7 102 18 125/80 (95)    


 


18 08:23        30








General Appearance:  No Acute Distress


HEENT:  Mucous Memb Moist/Pink


Respiratory:  Normal Air Movement, Other (stable distant lung sounds)


Cardiovascular:  Regular Rate


Abdominal:  Soft


Extremities:  No Cyanosis, No Edema


Skin:  Other (skin breakdown on the right arm)


Psych/Mental Status:  Mental Status NL





Discussion & Recommendations


On day of dismissal we discussed her home situation.  I informed her there 

should be absolutely no cigarette smoking in the house.  Family member do smoke 

in the house claims a blow smoke out the window.  She will follow up first part 

of the week or get back with me sooner if she has any problems.





Discharge


Home Medications


Reviewed and agree with Discharge Medication list on patient's Discharge 

Instruction sheet


Instructions to Patient/Family


Please see electronic discharge instructions given to patient.





Clinical Quality Measures


DVT/VTE Risk/Contraindication:


Risk Factor Score Per Nursin


RFS Level Per Nursing on Admit:  4+=Very High











NINA HENLEY MD 2018 07:45

## 2018-02-14 NOTE — PULMONARY PROGRESS NOTE
Subjective


Time Seen by Provider:  06:05


Subjective/Events-last exam


No complications noted.





Exam


Exam





Vital Signs








  Date Time  Temp Pulse Resp B/P (MAP) Pulse Ox O2 Delivery O2 Flow Rate FiO2


 


18 04:00 96.7 102 18 125/80 (95) 93 Nasal Cannula 3.00 


 


18 02:09     93 Nasal Cannula 1.00 


 


18 00:00 97.0 88 20 151/75 (100) 93 Nasal Cannula 3.00 


 


18 22:16     97 Nasal Cannula 1.00 


 


18 20:00      Nasal Cannula 3.00 


 


18 18:04     100 Nasal Cannula 3.00 


 


18 18:00  110 18 109/89 (96) 100 Nasal Cannula 3.00 


 


18 17:00  120 15 123/98 (106) 100 Nasal Cannula 3.00 


 


18 16:33     100 Nasal Cannula 3.00 


 


18 16:00  120 32 123/85 (98) 100 Nasal Cannula 3.00 


 


18 16:00      High Flow N/C 3.00 


 


18 15:00  114 30 100/84 (89) 100 Nasal Cannula 3.00 


 


18 14:39     100 Nasal Cannula 3.00 


 


18 14:00  109 13 98/66 (77) 100 Nasal Cannula 3.00 


 


18 13:00  120 20 112/81 (91) 100 Nasal Cannula 3.00 


 


18 13:00  120      


 


18 12:16     100 Nasal Cannula 3.00 


 


18 12:00  120 16 116/74 (88) 100 Nasal Cannula 3.00 


 


18 12:00 97.0       


 


18 12:00      High Flow N/C 3.00 


 


18 11:00  123 20 109/88 (95) 100 Nasal Cannula 3.00 


 


18 10:35     100 Nasal Cannula 3.00 


 


18 10:00  117 24 107/95 (99) 100 Nasal Cannula 3.00 


 


18 09:00  115 16 104/79 (87) 100 Nasal Cannula 3.00 


 


18 08:00  102 28 106/88 (94) 100 Nasal Cannula 3.00 


 


18 08:00 98.6     High Flow N/C 3.00 


 


18 08:00      High Flow N/C 3.00 


 


18 07:55 96.8       


 


18 07:00  117 24 142/92 (109) 100 Nasal Cannula 3.00 


 


18 07:00  122      


 


18 06:53     100 Nasal Cannula 3.00 














I & O 


 


 18





 07:00


 


Intake Total 1550 ml


 


Output Total 0 ml


 


Balance 1550 ml








General Appearance:  No Apparent Distress


HEENT:  Moist Mucous Membranes


Neck:  Supple


Respiratory:  Lungs Clear, Decreased Breath Sounds


Cardiovascular:  Regular Rate, Rhythm


Capillary Refill:  Less Than 3 Seconds


Gastrointestinal:  soft


Extremity:  No Pedal Edema


Neurologic/Psychiatric:  Alert, Oriented x3


Skin:  Normal Color


Lymphatic:  No Adenopathy





Results


Lab


Laboratory Tests


18 03:25








18 04:20











Assessment/Plan


Assessment/Plan


Acute on Chronic respiratory failure


   -Noninvasive ventilation PRN 


   -oxygen 


Panic attacks/psychosis


   -Risperdal BID  0.5mg BID 


      -Increase to 1mg BID 


   -Ativan PRN 


COPDAE/AsthmaAE 


   -SVNS  Q2 


   - prednisone taper 





Anxiety 


   -Continue home Ativan, morphine 














232





Clinical Quality Measures


DVT/VTE Risk/Contraindication:


Risk Factor Score Per Nursin


RFS Level Per Nursing on Admit:  4+=Very High











JOVON JOAQUIN DO 2018 06:07

## 2018-02-14 NOTE — DISCHARGE INST-SIMPLE/STANDARD
Discharge Inst-Standard


Discharge Medications


New, Converted or Re-Newed RX:  Transmitted to Pharmacy (in addition prednisone 

taper sent to [facility] pharmacy)





Patient Instructions/Follow Up


Plan of Care/Instructions/FU:  


follow-up with Dr. Henley Feb 20 (Tuesday)


Activity as Tolerated:  Yes


Discharge Diet:  Regular Diet


Return to The Hospital For:  


ncreasing shortness of breath, respiratory distress or fever





Planned Outpatient Orders/Ref.


Pneu Vac Indicated:  Yes











NINA HENLEY MD Feb 14, 2018 07:55

## 2018-03-22 ENCOUNTER — HOSPITAL ENCOUNTER (INPATIENT)
Dept: HOSPITAL 75 - ER | Age: 63
LOS: 1 days | Discharge: HOME | DRG: 192 | End: 2018-03-23
Attending: FAMILY MEDICINE | Admitting: FAMILY MEDICINE
Payer: MEDICARE

## 2018-03-22 VITALS — WEIGHT: 118 LBS | HEIGHT: 64 IN | BODY MASS INDEX: 20.14 KG/M2

## 2018-03-22 VITALS — DIASTOLIC BLOOD PRESSURE: 104 MMHG | SYSTOLIC BLOOD PRESSURE: 158 MMHG

## 2018-03-22 DIAGNOSIS — G47.9: ICD-10-CM

## 2018-03-22 DIAGNOSIS — Z87.74: ICD-10-CM

## 2018-03-22 DIAGNOSIS — R09.02: ICD-10-CM

## 2018-03-22 DIAGNOSIS — F17.210: ICD-10-CM

## 2018-03-22 DIAGNOSIS — Z99.81: ICD-10-CM

## 2018-03-22 DIAGNOSIS — K21.9: ICD-10-CM

## 2018-03-22 DIAGNOSIS — M19.91: ICD-10-CM

## 2018-03-22 DIAGNOSIS — F41.9: ICD-10-CM

## 2018-03-22 DIAGNOSIS — M54.9: ICD-10-CM

## 2018-03-22 DIAGNOSIS — F32.9: ICD-10-CM

## 2018-03-22 DIAGNOSIS — J44.1: Primary | ICD-10-CM

## 2018-03-22 DIAGNOSIS — R06.03: ICD-10-CM

## 2018-03-22 LAB
ALBUMIN SERPL-MCNC: 4.1 GM/DL (ref 3.2–4.5)
ALP SERPL-CCNC: 79 U/L (ref 40–136)
ALT SERPL-CCNC: 10 U/L (ref 0–55)
ARTERIAL PATENCY WRIST A: POSITIVE
BARBITURATES UR QL: NEGATIVE
BASE EXCESS STD BLDA CALC-SCNC: 4.6 MMOL/L (ref -2.5–2.5)
BASOPHILS # BLD AUTO: 0 10^3/UL (ref 0–0.1)
BASOPHILS NFR BLD AUTO: 0 % (ref 0–10)
BASOPHILS NFR BLD MANUAL: 0 %
BDY SITE: (no result)
BENZODIAZ UR QL SCN: POSITIVE
BILIRUB SERPL-MCNC: 0.9 MG/DL (ref 0.1–1)
BODY TEMPERATURE: 98.3
BUN/CREAT SERPL: 18
CALCIUM SERPL-MCNC: 9.7 MG/DL (ref 8.5–10.1)
CHLORIDE SERPL-SCNC: 96 MMOL/L (ref 98–107)
CO2 BLDA CALC-SCNC: 29.8 MMOL/L (ref 21–31)
CO2 SERPL-SCNC: 31 MMOL/L (ref 21–32)
COCAINE UR QL: NEGATIVE
CREAT SERPL-MCNC: 0.97 MG/DL (ref 0.6–1.3)
EOSINOPHIL # BLD AUTO: 0 10^3/UL (ref 0–0.3)
EOSINOPHIL NFR BLD AUTO: 0 % (ref 0–10)
EOSINOPHIL NFR BLD MANUAL: 0 %
ERYTHROCYTE [DISTWIDTH] IN BLOOD BY AUTOMATED COUNT: 14.8 % (ref 10–14.5)
GFR SERPLBLD BASED ON 1.73 SQ M-ARVRAT: 58 ML/MIN
GLUCOSE SERPL-MCNC: 126 MG/DL (ref 70–105)
HCT VFR BLD CALC: 41 % (ref 35–52)
HGB BLD-MCNC: 13.4 G/DL (ref 11.5–16)
INHALED O2 FLOW RATE: (no result) L/MIN
LYMPHOCYTES # BLD AUTO: 1.1 X 10^3 (ref 1–4)
LYMPHOCYTES NFR BLD AUTO: 4 % (ref 12–44)
MANUAL DIFFERENTIAL PERFORMED BLD QL: YES
MCH RBC QN AUTO: 30 PG (ref 25–34)
MCHC RBC AUTO-ENTMCNC: 33 G/DL (ref 32–36)
MCV RBC AUTO: 91 FL (ref 80–99)
METHADONE UR QL SCN: NEGATIVE
METHAMPHETAMINE SCREEN URINE S: NEGATIVE
MONOCYTES # BLD AUTO: 1.8 X 10^3 (ref 0–1)
MONOCYTES NFR BLD AUTO: 7 % (ref 0–12)
MONOCYTES NFR BLD: 3 %
NEUTROPHILS # BLD AUTO: 23.4 X 10^3 (ref 1.8–7.8)
NEUTROPHILS NFR BLD AUTO: 89 % (ref 42–75)
NEUTS BAND NFR BLD MANUAL: 84 %
NEUTS BAND NFR BLD: 6 %
OPIATES UR QL SCN: NEGATIVE
OXYCODONE UR QL: NEGATIVE
PCO2 BLDA: 42 MMHG (ref 35–45)
PH BLDA: 7.45 [PH] (ref 7.37–7.43)
PLATELET # BLD: 325 10^3/UL (ref 130–400)
PMV BLD AUTO: 8.7 FL (ref 7.4–10.4)
PO2 BLDA: 57 MMHG (ref 79–93)
POTASSIUM SERPL-SCNC: 4.7 MMOL/L (ref 3.6–5)
PROPOXYPH UR QL: NEGATIVE
PROT SERPL-MCNC: 6.9 GM/DL (ref 6.4–8.2)
RBC # BLD AUTO: 4.52 10^6/UL (ref 4.35–5.85)
RBC MORPH BLD: NORMAL
SAO2 % BLDA FROM PO2: 90 % (ref 94–100)
SODIUM SERPL-SCNC: 136 MMOL/L (ref 135–145)
TRICYCLICS UR QL SCN: POSITIVE
VARIANT LYMPHS NFR BLD MANUAL: 7 %
VENTILATION MODE VENT: NO
WBC # BLD AUTO: 26.3 10^3/UL (ref 4.3–11)

## 2018-03-22 PROCEDURE — 85025 COMPLETE CBC W/AUTO DIFF WBC: CPT

## 2018-03-22 PROCEDURE — 83605 ASSAY OF LACTIC ACID: CPT

## 2018-03-22 PROCEDURE — 94640 AIRWAY INHALATION TREATMENT: CPT

## 2018-03-22 PROCEDURE — 71045 X-RAY EXAM CHEST 1 VIEW: CPT

## 2018-03-22 PROCEDURE — 80053 COMPREHEN METABOLIC PANEL: CPT

## 2018-03-22 PROCEDURE — 96374 THER/PROPH/DIAG INJ IV PUSH: CPT

## 2018-03-22 PROCEDURE — 36415 COLL VENOUS BLD VENIPUNCTURE: CPT

## 2018-03-22 PROCEDURE — 94760 N-INVAS EAR/PLS OXIMETRY 1: CPT

## 2018-03-22 PROCEDURE — 80306 DRUG TEST PRSMV INSTRMNT: CPT

## 2018-03-22 PROCEDURE — 87040 BLOOD CULTURE FOR BACTERIA: CPT

## 2018-03-22 PROCEDURE — 96361 HYDRATE IV INFUSION ADD-ON: CPT

## 2018-03-22 PROCEDURE — 94660 CPAP INITIATION&MGMT: CPT

## 2018-03-22 PROCEDURE — 85007 BL SMEAR W/DIFF WBC COUNT: CPT

## 2018-03-22 PROCEDURE — 82805 BLOOD GASES W/O2 SATURATION: CPT

## 2018-03-22 PROCEDURE — 85027 COMPLETE CBC AUTOMATED: CPT

## 2018-03-22 RX ADMIN — SODIUM CHLORIDE SCH MLS/HR: 900 INJECTION, SOLUTION INTRAVENOUS at 21:44

## 2018-03-22 NOTE — ED COUGH/URI
General


Stated Complaint:  COPD


Source:  patient


Exam Limitations:  no limitations





History of Present Illness


Date Seen by Provider:  Mar 22, 2018


Time Seen by Provider:  17:50


Initial Comments


To [place] per EMS from [place] [facility] from [place] with reports 

of 6 days of dyspnea. She is a current smoker with a history of COPD. She wears 

oxygen around-the-clock at home. She arrives in respiratory distress. One 

treatment had been given in route.


Timing/Duration:  constant, getting worse


Severity/Quality:  moderate


Associated Symptoms:  cough, shortness of breath, wheezing





Allergies and Home Medications


Allergies


Coded Allergies:  


     fluticasone (Unverified  Allergy, Mild, 12/3/09)


     salmeterol (Unverified  Allergy, Mild, 12/3/09)





Home Medications


Acetaminophen with Codeine 1 Each Tablet, 1 TAB PO Q6H PRN for PAIN-MODERATE, (

Reported)


Albuterol Sulfate 2.5 Mg/0.5 Ml Vial.neb, 2.5 MG IH Q4H PRN for SHORTNESS OF 

BREATH, (Reported)


Albuterol Sulfate 18 Gm Hfa.aer.ad, 2 PUFF INH Q4H PRN for SHORTNESS OF BREATH, 

(Reported)


Amitriptyline HCl 25 Mg Tablet, 25 MG PO DAILY, (Reported)


Aspirin/Acetaminophen/Caffeine 1 Each Tablet, 1 TAB PO BID PRN for MIGRAINE, (

Reported)


Benzonatate 200 Mg Capsule, 200 MG PO Q8H PRN for COUGH, (Reported)


Citalopram Hydrobromide 10 Mg Tablet, 10 MG PO DAILY, (Reported)


Lorazepam 1 Mg Tablet, 1 MG PO Q6H PRN for ANXIETY/PAIN, (Reported)


Omeprazole 40 Mg Capsule.dr, 40 MG PO DAILY PRN for HEARTBURN, (Reported)


Prednisone 10 Mg Tab, 10 MG PO UD


   Prescribed by: RUBI SMALLS on 2/14/18 0922


Risperidone 1 Mg Tablet, 2 MG PO BID


   Prescribed by: RUBI SMALLS on 2/14/18 0926


Tiotropium Bromide 1 Inh Aerp, 1 CAP IH DAILY, (Reported)


Trazodone HCl 50 Mg Tablet, 50 MG PO HS, (Reported)





Patient Home Medication List


Home Medication List Reviewed:  Yes





Constitutional:  see HPI


EENTM:  see HPI


Respiratory:  see HPI, cough, short of breath, wheezing


Genitourinary:  no symptoms reported


Musculoskeletal:  no symptoms reported


Skin:  no symptoms reported


Psychiatric/Neurological:  No Symptoms Reported





Past Medical-Social-Family Hx


Patient Social History


Type Used:  Cigarettes


Former Smoker, Quit:  Jan 11, 2016


2nd Hand Smoke Exposure:  Yes


Recent Hopitalizations:  Yes (october 2017)





Immunizations Up To Date


Date of Influenza Vaccine:  Jan 4, 2018





Seasonal Allergies


Seasonal Allergies:  No





Surgeries


History of Surgeries:  Yes (TUBAL LIG)


Surgeries:  Tubal Ligation





Respiratory


History of Respiratory Disorde:  Yes (O2 DEPENDENT)


Respiratory Disorders:  COPD


Currently Using CPAP:  No


Currently Using BIPAP:  No





Cardiovascular


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





Neurological


History of Neurological Disord:  No





Reproductive System


Hx Reproductive Disorders:  No


Sexually Transmitted Disease:  No


HIV/AIDS:  No


Female Reproductive Disorders:  Denies


GYN History:  Tubal Ligation, Menopausal





Genitourinary


History of Genitourinary Disor:  No





Gastrointestinal


History of Gastrointestinal Di:  Yes


Gastrointestinal Disorders:  Gastroesophageal Reflux





Musculoskeletal


History of Musculoskeletal Dis:  Yes


Musculoskeletal Disorders:  Degenerate Disk Disease, Arthritis, Chronic Back 

Pain





Endocrine


History of Endocrine Disorders:  No





HEENT


History of HEENT Disorders:  Yes


HEENT Disorders:  Cataract


Loss of Vision:  Denies


Hearing Impairment:  Denies





Cancer


History of Cancer:  No





Psychosocial


History of Psychiatric Problem:  Yes


Behavioral Health Disorders:  Sleep Difficulties, Anxiety, Depression





Integumentary


History of Skin or Integumenta:  Yes (SHINGLES 12/2016)





Blood Transfusions


History of Blood Disorders:  No


Adverse Reaction to a Blood Tr:  No





Family Medical History


Family Medial History:  


Neoplasm


  19 FATHER


Respiratory disorder


  19 FATHER





Physical Exam


Vital Signs





Vital Signs - First Documented








 3/22/18 3/22/18





 17:40 17:50


 


Temp 98.3 


 


Pulse 120 


 


Resp 28 


 


B/P (MAP) 158/104 (122) 


 


Pulse Ox 80 


 


O2 Delivery Venturi Mask 


 


O2 Flow Rate  50.00





Capillary Refill :


General Appearance:  WD/WN, moderate distress, other (minimal air movement, 

accessory muscle use)


Eyes:  Bilateral Eye Normal Inspection, Bilateral Eye PERRL, Bilateral Eye EOMI


HEENT:  PERRL/EOMI, normal ENT inspection


Neck:  non-tender, full range of motion


Respiratory:  respiratory distress, accessory muscle use


Cardiovascular:  no murmur, tachycardia


Gastrointestinal:  normal bowel sounds, non tender, soft


Neurologic/Psychiatric:  alert, normal mood/affect, oriented x 3


Skin:  normal color, warm/dry


Appears much older than stated age and is in distress. BiPAP placed on arrival.





Focused Exam


Evaluation


Lactate Level


Laboratory Tests


3/22/18 18:45: Lactic Acid Level 1.41





Lactic Acid Level





Laboratory Tests








Test


  3/22/18


18:45


 


Lactic Acid Level


  1.41 MMOL/L


(0.50-2.00)











Laceration Repair :  


   Suture Size:  5-0





Progress/Results/Core Measures


Suspected Sepsis


SIRS


Temperature: 


Pulse:  


Respiratory Rate: 


 


Laboratory Tests


3/22/18 18:45: White Blood Count 26.3H


Blood Pressure  / 


Mean: 


 





Laboratory Tests


3/22/18 18:45: Lactic Acid Level 1.41


Laboratory Tests


3/22/18 18:45: 


Creatinine 0.97, Platelet Count 325, Total Bilirubin 0.9








Results/Orders


Lab Results





Laboratory Tests








Test


  3/22/18


17:45 3/22/18


18:29 3/22/18


18:45 Range/Units


 


 


Blood Gas Puncture Site RIGHT RADIAL     


 


Blood Gas Patient Temperature 98.3     


 


Arterial Blood pH 7.45 H   7.37-7.43  


 


Arterial Blood Partial


Pressure CO2 42 


  


  


  35-45  MMHG


 


 


Arterial Blood Partial


Pressure O2 57 L


  


  


  79-93  MMHG


 


 


Arterial Blood HCO3 29 H   23-27  MMOL/L


 


Arterial Blood Total CO2


  29.8 


  


  


  21.0-31.0


MMOL/L


 


Arterial Blood Oxygen


Saturation 90 L


  


  


    %


 


 


Arterial Blood Base Excess


  4.6 H


  


  


  -2.5-2.5


MMOL/L


 


Hermes Test POSITIVE     


 


Blood Gas Ventilator Setting NO     


 


Blood Gas Inspired Oxygen 50% BIPAP     


 


Urine Opiates Screen  NEGATIVE   NEGATIVE  


 


Urine Oxycodone Screen  NEGATIVE   NEGATIVE  


 


Urine Methadone Screen  NEGATIVE   NEGATIVE  


 


Urine Propoxyphene Screen  NEGATIVE   NEGATIVE  


 


Urine Barbiturates Screen  NEGATIVE   NEGATIVE  


 


Ur Tricyclic Antidepressants


Screen 


  POSITIVE H


  


  NEGATIVE  


 


 


Urine Phencyclidine Screen  NEGATIVE   NEGATIVE  


 


Urine Amphetamines Screen  NEGATIVE   NEGATIVE  


 


Urine Methamphetamines Screen  NEGATIVE   NEGATIVE  


 


Urine Benzodiazepines Screen  POSITIVE H  NEGATIVE  


 


Urine Cocaine Screen  NEGATIVE   NEGATIVE  


 


Urine Cannabinoids Screen  NEGATIVE   NEGATIVE  


 


White Blood Count


  


  


  26.3 H


  4.3-11.0


10^3/uL


 


Red Blood Count


  


  


  4.52 


  4.35-5.85


10^6/uL


 


Hemoglobin   13.4  11.5-16.0  G/DL


 


Hematocrit   41  35-52  %


 


Mean Corpuscular Volume   91  80-99  FL


 


Mean Corpuscular Hemoglobin   30  25-34  PG


 


Mean Corpuscular Hemoglobin


Concent 


  


  33 


  32-36  G/DL


 


 


Red Cell Distribution Width   14.8 H 10.0-14.5  %


 


Platelet Count


  


  


  325 


  130-400


10^3/uL


 


Mean Platelet Volume   8.7  7.4-10.4  FL


 


Neutrophils (%) (Auto)   89 H 42-75  %


 


Lymphocytes (%) (Auto)   4 L 12-44  %


 


Monocytes (%) (Auto)   7  0-12  %


 


Eosinophils (%) (Auto)   0  0-10  %


 


Basophils (%) (Auto)   0  0-10  %


 


Neutrophils # (Auto)   23.4 H 1.8-7.8  X 10^3


 


Lymphocytes # (Auto)   1.1  1.0-4.0  X 10^3


 


Monocytes # (Auto)   1.8 H 0.0-1.0  X 10^3


 


Eosinophils # (Auto)


  


  


  0.0 


  0.0-0.3


10^3/uL


 


Basophils # (Auto)


  


  


  0.0 


  0.0-0.1


10^3/uL


 


Neutrophils % (Manual)   84   %


 


Lymphocytes % (Manual)   7   %


 


Monocytes % (Manual)   3   %


 


Eosinophils % (Manual)   0   %


 


Basophils % (Manual)   0   %


 


Band Neutrophils   6   %


 


Blood Morphology Comment   NORMAL   


 


Sodium Level   136  135-145  MMOL/L


 


Potassium Level   4.7  3.6-5.0  MMOL/L


 


Chloride Level   96 L   MMOL/L


 


Carbon Dioxide Level   31  21-32  MMOL/L


 


Anion Gap   9  5-14  MMOL/L


 


Blood Urea Nitrogen   17  7-18  MG/DL


 


Creatinine


  


  


  0.97 


  0.60-1.30


MG/DL


 


Estimat Glomerular Filtration


Rate 


  


  58 


   


 


 


BUN/Creatinine Ratio   18   


 


Glucose Level   126 H   MG/DL


 


Lactic Acid Level


  


  


  1.41 


  0.50-2.00


MMOL/L


 


Calcium Level   9.7  8.5-10.1  MG/DL


 


Total Bilirubin   0.9  0.1-1.0  MG/DL


 


Aspartate Amino Transf


(AST/SGOT) 


  


  14 


  5-34  U/L


 


 


Alanine Aminotransferase


(ALT/SGPT) 


  


  10 


  0-55  U/L


 


 


Alkaline Phosphatase   79    U/L


 


Total Protein   6.9  6.4-8.2  GM/DL


 


Albumin   4.1  3.2-4.5  GM/DL








My Orders





Orders - HERNANDEZ HERNANDEZ APRN


Albuterol/Ipra Inhalation Soln (Duoneb I (3/22/18 17:44)


Arterial Blood Gas (3/22/18 17:50)


Cbc With Automated Diff (3/22/18 17:52)


Comprehensive Metabolic Panel (3/22/18 17:52)


Blood Culture (3/22/18 17:52)


Lactic Acid Analyzer (3/22/18 17:52)


Bipap Rt-Rfs (3/22/18 17:52)


Saline Lock/Iv-Start (3/22/18 17:52)


Lactated Ringers (Lr 1000 Ml Iv Solution (3/22/18 18:00)


Chest 1 View, Ap/Pa Only (3/22/18 18:16)


Drug Screen Stat (Urine) (3/22/18 18:18)


Manual Differential (3/22/18 18:45)


Methylprednisolone Sod Succ (Solu-Medrol (3/22/18 19:15)





Medications Given in ED





Current Medications








 Medications  Dose


 Ordered  Sig/Arlet


 Route  Start Time


 Stop Time Status Last Admin


Dose Admin


 


 Methylprednisolone


 Sodium Succinate  125 mg  ONCE  ONCE


 IVP  3/22/18 19:15


 3/22/18 19:16 DC 3/22/18 19:48


125 MG








Vital Signs/I&O





Vital Sign - Last 12Hours








 3/22/18 3/22/18





 17:40 17:50


 


Temp 98.3 


 


Pulse 120 123


 


Resp 28 20


 


B/P (MAP) 158/104 (122) 


 


Pulse Ox 80 99


 


O2 Delivery Venturi Mask 


 


O2 Flow Rate  50.00





Capillary Refill :





Departure


Impression


Impression:  


 Primary Impression:  


 COPD with acute exacerbation


Disposition:  09 ADMITTED AS INPATIENT


Condition:  Stable





Admissions


Decision to Admit Reason:  Admit from ER (General)


Decision to Admit/Date:  Mar 22, 2018


Time/Decision to Admit Time:  23:25





Departure-Patient Inst.


Referrals:  


NINA HENLEY MD (PCP/Family)


Primary Care Physician











HERNANDEZ HERNANDEZ Mar 22, 2018 17:51

## 2018-03-22 NOTE — DIAGNOSTIC IMAGING REPORT
EXAMINATION: Chest radiograph, portable AP view.



DATE: March 22, 2018, at 1845 hours.



INDICATION: 62-year-old female, shortness of breath. History of

COPD.



COMPARISON:  February 13, 2018.



FINDINGS: Stable overall appearance of the cardiomediastinal

silhouette. There is no identified pneumothorax. There is no

large pleural effusion. There is no identified focal airspace

consolidation.



IMPRESSION: No identified acute cardiopulmonary abnormality.



Dictated by: 



  Dictated on workstation # [id number]

## 2018-03-23 VITALS — SYSTOLIC BLOOD PRESSURE: 108 MMHG | DIASTOLIC BLOOD PRESSURE: 57 MMHG

## 2018-03-23 VITALS — SYSTOLIC BLOOD PRESSURE: 99 MMHG | DIASTOLIC BLOOD PRESSURE: 64 MMHG

## 2018-03-23 VITALS — DIASTOLIC BLOOD PRESSURE: 76 MMHG | SYSTOLIC BLOOD PRESSURE: 109 MMHG

## 2018-03-23 VITALS — DIASTOLIC BLOOD PRESSURE: 58 MMHG | SYSTOLIC BLOOD PRESSURE: 100 MMHG

## 2018-03-23 LAB
BASOPHILS # BLD AUTO: 0 10^3/UL (ref 0–0.1)
BASOPHILS NFR BLD AUTO: 0 % (ref 0–10)
EOSINOPHIL # BLD AUTO: 0 10^3/UL (ref 0–0.3)
EOSINOPHIL NFR BLD AUTO: 0 % (ref 0–10)
ERYTHROCYTE [DISTWIDTH] IN BLOOD BY AUTOMATED COUNT: 14.7 % (ref 10–14.5)
HCT VFR BLD CALC: 34 % (ref 35–52)
HGB BLD-MCNC: 11 G/DL (ref 11.5–16)
LYMPHOCYTES # BLD AUTO: 0.4 X 10^3 (ref 1–4)
LYMPHOCYTES NFR BLD AUTO: 2 % (ref 12–44)
MANUAL DIFFERENTIAL PERFORMED BLD QL: NO
MCH RBC QN AUTO: 29 PG (ref 25–34)
MCHC RBC AUTO-ENTMCNC: 32 G/DL (ref 32–36)
MCV RBC AUTO: 92 FL (ref 80–99)
MONOCYTES # BLD AUTO: 0.2 X 10^3 (ref 0–1)
MONOCYTES NFR BLD AUTO: 1 % (ref 0–12)
NEUTROPHILS # BLD AUTO: 18.4 X 10^3 (ref 1.8–7.8)
NEUTROPHILS NFR BLD AUTO: 97 % (ref 42–75)
PLATELET # BLD: 291 10^3/UL (ref 130–400)
PMV BLD AUTO: 9 FL (ref 7.4–10.4)
RBC # BLD AUTO: 3.74 10^6/UL (ref 4.35–5.85)
WBC # BLD AUTO: 19 10^3/UL (ref 4.3–11)

## 2018-03-23 RX ADMIN — IPRATROPIUM BROMIDE AND ALBUTEROL SULFATE SCH ML: .5; 3 SOLUTION RESPIRATORY (INHALATION) at 07:28

## 2018-03-23 RX ADMIN — METHYLPREDNISOLONE SODIUM SUCCINATE SCH MG: 125 INJECTION, POWDER, FOR SOLUTION INTRAMUSCULAR; INTRAVENOUS at 11:45

## 2018-03-23 RX ADMIN — IPRATROPIUM BROMIDE AND ALBUTEROL SULFATE SCH ML: .5; 3 SOLUTION RESPIRATORY (INHALATION) at 11:33

## 2018-03-23 RX ADMIN — IPRATROPIUM BROMIDE AND ALBUTEROL SULFATE SCH ML: .5; 3 SOLUTION RESPIRATORY (INHALATION) at 02:49

## 2018-03-23 RX ADMIN — METHYLPREDNISOLONE SODIUM SUCCINATE SCH MG: 125 INJECTION, POWDER, FOR SOLUTION INTRAMUSCULAR; INTRAVENOUS at 03:35

## 2018-03-23 RX ADMIN — IPRATROPIUM BROMIDE AND ALBUTEROL SULFATE SCH ML: .5; 3 SOLUTION RESPIRATORY (INHALATION) at 15:22

## 2018-03-23 RX ADMIN — SODIUM CHLORIDE SCH MLS/HR: 900 INJECTION, SOLUTION INTRAVENOUS at 14:11

## 2018-03-23 RX ADMIN — SODIUM CHLORIDE SCH MLS/HR: 900 INJECTION, SOLUTION INTRAVENOUS at 06:53

## 2018-03-23 NOTE — DISCHARGE INST-SIMPLE/STANDARD
Discharge Inst-Standard


Discharge Medications


New, Converted or Re-Newed RX:  Transmitted to Pharmacy





Patient Instructions/Follow Up


Plan of Care/Instructions/FU:  


Dr Henley on March 28 or March 29--call to make appt [phone].


Take Prednisone 3 daily for 3 days, then 2 daily for 3 days, then 1 daily


for 3 days.


Take zithromax 2 the first day then 1 daily for 4 additional days.


Avoid smoking


Activity as Tolerated:  Yes


Discharge Diet:  Regular Diet


Return to The Hospital For:  


Worseing shortness of breath.





Planned Outpatient Orders/Ref.


Pneu Vac Indicated:  Yes











NINA HENLEY MD Mar 23, 2018 11:47

## 2018-03-23 NOTE — HISTORY & PHYSICIAL
History of Present Illness


History of Present Illness


Reason for visit/HPI


62-year-old female brought in by EMS during the evening of 2018 with 

shortness of breath.  She apparently had been experiencing slight dyspnea over 

the last 6 days prior to presentation.  She has known COPD and is currently a 

smoker of cigarettes.  She has been wearing oxygen at home.  There is been no 

reports of fever.  Other symptoms include cough nonproductive.


Date of Admission


Mar 22, 2018 at 20:25


Date Seen by Provider:  Mar 23, 2018


Time Seen by Provider:  07:20


I consulted on this patient on


3/23/18


 07:18


Attending Physician


Nina Henley MD


Admitting Physician


Nina Henley MD


Consult








Allergies and Home Medications


Allergies


Coded Allergies:  


     fluticasone (Unverified  Allergy, Mild, 12/3/09)


     salmeterol (Unverified  Allergy, Mild, 12/3/09)





Home Medications


Acetaminophen with Codeine 1 Each Tablet, 1 TAB PO Q6H PRN for PAIN-MODERATE, (

Reported)


Albuterol Sulfate 2.5 Mg/0.5 Ml Vial.neb, 2.5 MG IH Q4H PRN for SHORTNESS OF 

BREATH, (Reported)


Albuterol Sulfate 18 Gm Hfa.aer.ad, 2 PUFF INH Q4H PRN for SHORTNESS OF BREATH, 

(Reported)


Amitriptyline HCl 25 Mg Tablet, 25 MG PO DAILY, (Reported)


Aspirin/Acetaminophen/Caffeine 1 Each Tablet, 1 TAB PO BID PRN for MIGRAINE, (

Reported)


Benzonatate 200 Mg Capsule, 200 MG PO Q8H PRN for COUGH, (Reported)


Citalopram Hydrobromide 10 Mg Tablet, 10 MG PO DAILY, (Reported)


Lorazepam 1 Mg Tablet, 1 MG PO Q6H PRN for ANXIETY/PAIN, (Reported)


Omeprazole 40 Mg Capsule.dr, 40 MG PO DAILY PRN for HEARTBURN, (Reported)


Prednisone 10 Mg Tab, 10 MG PO UD


   Prescribed by: RUBI SMALLS on 18


Risperidone 1 Mg Tablet, 2 MG PO BID


   Prescribed by: RUBI SMALLS on 18


Tiotropium Bromide 1 Inh Aerp, 1 CAP IH DAILY, (Reported)


Trazodone HCl 50 Mg Tablet, 50 MG PO HS, (Reported)





Patient Home Medication List


Home Medication List Reviewed:  Yes





Past Medical-Social-Family Hx


Patient Social History


Marrital Status:  cohabiting


Alcohol Use:  Denies Use


Recreational Drug Use:  No


Smoking Status:  Former Smoker


Former Smoker, Quit:  2016


Type Used:  Cigarettes


2nd Hand Smoke Exposure:  Yes


Physical Abuse Screen:  No


Sexual Abuse:  No


Recent Foreign Travel:  No


Contact w/other who traveled:  No


Recent Hopitalizations:  Yes (2018)


Recent Infectious Disease Expo:  No





Immunizations Up To Date


Date of Influenza Vaccine:  2018





Seasonal Allergies


Seasonal Allergies:  No





Surgeries


Yes (TUBAL LIG)


Tubal Ligation





Respiratory


Yes (O2 DEPENDENT)


Currently Using CPAP:  No


Currently Using BIPAP:  No





Cardiovascular


Yes (PIN HOLE IN HEART)





Neurological


No





Reproductive System


Hx Reproductive Disorders:  No


Sexually Transmitted Disease:  No


HIV/AIDS:  No


Female Reproductive Disorders:  Denies


GYN History:  Tubal Ligation, Menopausal





Genitourinary


No





Gastrointestinal


Yes


Gastroesophageal Reflux





Musculoskeletal


Yes


Degenerate Disk Disease, Arthritis, Chronic Back Pain





Endocrine


History of Endocrine Disorders:  No





HEENT


History of HEENT Disorders:  Yes


HEENT Disorders:  Cataract


Loss of Vision:  Denies


Hearing Impairment:  Denies





Cancer


No





Psychosocial


History of Psychiatric Problem:  Yes


Behavioral Health Disorders:  Sleep Difficulties, Anxiety, Depression





Integumentary


History of Skin or Integumenta:  Yes (SHINGLES 2016)





Blood Transfusions


History of Blood Disorders:  No


Adverse Reaction to a Blood Tr:  No





Family Medical History


Family Hx:  


Neoplasm


  19 FATHER


Respiratory disorder


  19 FATHER





Constitutional:  see HPI





Physical Exam


Vital Signs





Vital Signs - First Documented








 3/22/18 3/22/18 3/22/18





 17:40 17:50 21:37


 


Temp 98.3  


 


Pulse 120  


 


Resp 28  


 


B/P (MAP) 158/104 (122)  


 


Pulse Ox 80  


 


O2 Delivery Venturi Mask  


 


O2 Flow Rate  50.00 


 


FiO2   50





Capillary Refill : Less Than 3 Seconds


General Appearance:  Moderate Distress (On presentation to ED)


Eyes:  Bilateral Eye Normal Inspection


Neck:  Supple





Assessment/Plan


Assessment and Plan


1.  COPD exacerbation


-Patient has been improved in the emergency department with providing Solu-

Medrol and BiPAP


-She'll be admitted for observation overnight to ensure stability before being 

sent home





2.  Hypoxemia


-Supplemental oxygen


-Obviously remain on oxygen supplemented at home


Problems:  





Admission Diagnosis


Admission Status:  Observation





Clinical Quality Measures


DVT/VTE Risk/Contraindication:


Risk Factor Score Per Nursin


RFS Level Per Nursing on Admit:  4+=Very High











NINA HENLEY MD Mar 23, 2018 07:21

## 2018-05-20 ENCOUNTER — HOSPITAL ENCOUNTER (EMERGENCY)
Dept: HOSPITAL 75 - ER | Age: 63
Discharge: HOME | End: 2018-05-20
Payer: MEDICARE

## 2018-05-20 VITALS — HEIGHT: 64 IN | WEIGHT: 118 LBS | BODY MASS INDEX: 20.14 KG/M2

## 2018-05-20 VITALS — SYSTOLIC BLOOD PRESSURE: 110 MMHG | DIASTOLIC BLOOD PRESSURE: 75 MMHG

## 2018-05-20 VITALS — SYSTOLIC BLOOD PRESSURE: 107 MMHG | DIASTOLIC BLOOD PRESSURE: 75 MMHG

## 2018-05-20 DIAGNOSIS — Z79.51: ICD-10-CM

## 2018-05-20 DIAGNOSIS — F17.210: ICD-10-CM

## 2018-05-20 DIAGNOSIS — K21.9: ICD-10-CM

## 2018-05-20 DIAGNOSIS — Z88.8: ICD-10-CM

## 2018-05-20 DIAGNOSIS — Z79.52: ICD-10-CM

## 2018-05-20 DIAGNOSIS — F32.9: ICD-10-CM

## 2018-05-20 DIAGNOSIS — Z98.51: ICD-10-CM

## 2018-05-20 DIAGNOSIS — F41.9: ICD-10-CM

## 2018-05-20 DIAGNOSIS — J44.1: Primary | ICD-10-CM

## 2018-05-20 DIAGNOSIS — Z79.82: ICD-10-CM

## 2018-05-20 LAB
ALBUMIN SERPL-MCNC: 4.2 GM/DL (ref 3.2–4.5)
ALP SERPL-CCNC: 69 U/L (ref 40–136)
ALT SERPL-CCNC: 14 U/L (ref 0–55)
APTT BLD: 35 SEC (ref 24–35)
APTT PPP: YELLOW S
BACTERIA #/AREA URNS HPF: (no result) /HPF
BASOPHILS # BLD AUTO: 0 10^3/UL (ref 0–0.1)
BASOPHILS NFR BLD AUTO: 0 % (ref 0–10)
BILIRUB SERPL-MCNC: 0.6 MG/DL (ref 0.1–1)
BILIRUB UR QL STRIP: NEGATIVE
BUN/CREAT SERPL: 17
CALCIUM SERPL-MCNC: 9.1 MG/DL (ref 8.5–10.1)
CHLORIDE SERPL-SCNC: 96 MMOL/L (ref 98–107)
CO2 SERPL-SCNC: 27 MMOL/L (ref 21–32)
CREAT SERPL-MCNC: 1.02 MG/DL (ref 0.6–1.3)
D DIMER PPP FEU-MCNC: 1.1 UG/ML (ref 0–0.49)
EOSINOPHIL # BLD AUTO: 0 10^3/UL (ref 0–0.3)
EOSINOPHIL NFR BLD AUTO: 0 % (ref 0–10)
ERYTHROCYTE [DISTWIDTH] IN BLOOD BY AUTOMATED COUNT: 15.4 % (ref 10–14.5)
FIBRINOGEN PPP-MCNC: CLEAR MG/DL
GFR SERPLBLD BASED ON 1.73 SQ M-ARVRAT: 55 ML/MIN
GLUCOSE SERPL-MCNC: 89 MG/DL (ref 70–105)
GLUCOSE UR STRIP-MCNC: NEGATIVE MG/DL
HCT VFR BLD CALC: 41 % (ref 35–52)
HGB BLD-MCNC: 13.2 G/DL (ref 11.5–16)
INR PPP: 1 (ref 0.8–1.4)
KETONES UR QL STRIP: (no result)
LEUKOCYTE ESTERASE UR QL STRIP: (no result)
LYMPHOCYTES # BLD AUTO: 1.1 X 10^3 (ref 1–4)
LYMPHOCYTES NFR BLD AUTO: 17 % (ref 12–44)
MANUAL DIFFERENTIAL PERFORMED BLD QL: NO
MCH RBC QN AUTO: 29 PG (ref 25–34)
MCHC RBC AUTO-ENTMCNC: 32 G/DL (ref 32–36)
MCV RBC AUTO: 90 FL (ref 80–99)
MONOCYTES # BLD AUTO: 0.6 X 10^3 (ref 0–1)
MONOCYTES NFR BLD AUTO: 9 % (ref 0–12)
NEUTROPHILS # BLD AUTO: 4.9 X 10^3 (ref 1.8–7.8)
NEUTROPHILS NFR BLD AUTO: 74 % (ref 42–75)
NITRITE UR QL STRIP: NEGATIVE
PH UR STRIP: 6 [PH] (ref 5–9)
PLATELET # BLD: 181 10^3/UL (ref 130–400)
PMV BLD AUTO: 9.1 FL (ref 7.4–10.4)
POTASSIUM SERPL-SCNC: 4.4 MMOL/L (ref 3.6–5)
PROT SERPL-MCNC: 6.7 GM/DL (ref 6.4–8.2)
PROT UR QL STRIP: (no result)
PROTHROMBIN TIME: 12.9 SEC (ref 12.2–14.7)
RBC # BLD AUTO: 4.54 10^6/UL (ref 4.35–5.85)
RBC #/AREA URNS HPF: (no result) /HPF
SODIUM SERPL-SCNC: 136 MMOL/L (ref 135–145)
SP GR UR STRIP: 1.02 (ref 1.02–1.02)
SQUAMOUS #/AREA URNS HPF: (no result) /HPF
TSH SERPL DL<=0.05 MIU/L-ACNC: 1.28 UIU/ML (ref 0.35–4.94)
UROBILINOGEN UR-MCNC: NORMAL MG/DL
WBC # BLD AUTO: 6.5 10^3/UL (ref 4.3–11)
WBC #/AREA URNS HPF: (no result) /HPF

## 2018-05-20 PROCEDURE — 85610 PROTHROMBIN TIME: CPT

## 2018-05-20 PROCEDURE — 71275 CT ANGIOGRAPHY CHEST: CPT

## 2018-05-20 PROCEDURE — 96374 THER/PROPH/DIAG INJ IV PUSH: CPT

## 2018-05-20 PROCEDURE — 83605 ASSAY OF LACTIC ACID: CPT

## 2018-05-20 PROCEDURE — 96361 HYDRATE IV INFUSION ADD-ON: CPT

## 2018-05-20 PROCEDURE — 80053 COMPREHEN METABOLIC PANEL: CPT

## 2018-05-20 PROCEDURE — 94664 DEMO&/EVAL PT USE INHALER: CPT

## 2018-05-20 PROCEDURE — 81000 URINALYSIS NONAUTO W/SCOPE: CPT

## 2018-05-20 PROCEDURE — 85379 FIBRIN DEGRADATION QUANT: CPT

## 2018-05-20 PROCEDURE — 85730 THROMBOPLASTIN TIME PARTIAL: CPT

## 2018-05-20 PROCEDURE — 84443 ASSAY THYROID STIM HORMONE: CPT

## 2018-05-20 PROCEDURE — 36415 COLL VENOUS BLD VENIPUNCTURE: CPT

## 2018-05-20 PROCEDURE — 93005 ELECTROCARDIOGRAM TRACING: CPT

## 2018-05-20 PROCEDURE — 84484 ASSAY OF TROPONIN QUANT: CPT

## 2018-05-20 PROCEDURE — 87040 BLOOD CULTURE FOR BACTERIA: CPT

## 2018-05-20 PROCEDURE — 94640 AIRWAY INHALATION TREATMENT: CPT

## 2018-05-20 PROCEDURE — 71045 X-RAY EXAM CHEST 1 VIEW: CPT

## 2018-05-20 PROCEDURE — 85025 COMPLETE CBC W/AUTO DIFF WBC: CPT

## 2018-05-20 NOTE — DIAGNOSTIC IMAGING REPORT
PROCEDURE: CT angiography of the chest with contrast.



TECHNIQUE: Multiple contiguous axial images were obtained through

the chest after uneventful bolus administration of intravenous

contrast. Reconstructed CTA MIP acquisitions were also performed.

 

INDICATION:  Shortness of breath, COPD



COMPARISON: 12-2-16



FINDINGS:



The heart size is normal. There is no pericardial effusion. The

aorta and pulmonary arteries are grossly intact. There is no

embolism. There is no lymphadenopathy. Central airways are

grossly normal. Extensive COPD is present. There is chronic

scarring in the right middle lobe. There is an acute infiltrate

in the inferior left upper lobe, lingula and left posterior lung

base. There is no pneumothorax or effusion. Osseous structures

are grossly unremarkable. Visualized upper abdominal solid organs

are intact.



IMPRESSION:

1. No pulmonary embolism or acute aortic pathology.

2. COPD with acute infiltrate in the inferior left upper lobe and

left lung base.



Dictated by: 



  Dictated on workstation # [id number]

## 2018-05-20 NOTE — DIAGNOSTIC IMAGING REPORT
CLINICAL INDICATION: Patient with respiratory problems and

shortness of air.



EXAM: Portable chest x-ray upright view.



COMPARISON: Portable chest x-ray upright view dated 03/22/2018.



FINDINGS:

Stable increased lung markings in both lung bases which may

represent scarring. Otherwise, lungs are clear. There are

slightly hyperinflated lungs and flattened hemidiaphragms which

can be seen with COPD changes. Pulmonary vasculature and cardiac

silhouette within normal limits. There is no pleural effusion or

pneumothorax. There are degenerative spurs involving the thoracic

spine.



IMPRESSION:

1: Stable COPD lung changes.



2: Otherwise, stable chest x-ray exam with no interval

radiographic evidence of acute cardiopulmonary process.



Dictated by: 



  Dictated on workstation # [id number]

## 2018-05-20 NOTE — ED COUGH/URI
General


Chief Complaint:  Respiratory Problems


Stated Complaint:  SOA


Nursing Triage Note:  


TO ROOM PER [facility] C/O INCREASE SOA HAS PMH OF  COPD, RECEVING DUO NEB 


ON ADMIT BY EMS


Source:  patient


Exam Limitations:  no limitations





History of Present Illness


Date Seen by Provider:  May 20, 2018


Time Seen by Provider:  17:25


Initial Comments


62-year-old female patient presents to the emergency department with complaints 

of shortness of air.  Patient reports a history of COPD.  Patient did receive a 

DuoNeb treatment and Route by EMS with improvement in symptoms.  Patient denies 

fevers.  States she has been coughing up white mucus.


Timing/Duration:  other (chronic symptoms, worse today)


Severity/Quality:  productive cough


Prior Episodes/Possible Cause:  chronic episodes


Modifying Factors:  Worse With Activity, Worse With Coughing





Allergies and Home Medications


Allergies


Coded Allergies:  


     fluticasone (Unverified  Allergy, Mild, 12/3/09)


     salmeterol (Unverified  Allergy, Mild, 12/3/09)





Home Medications


Acetaminophen with Codeine 1 Each Tablet, 1 TAB PO Q6H PRN for PAIN-MODERATE, (

Reported)


Albuterol Sulfate 2.5 Mg/0.5 Ml Vial.neb, 2.5 MG IH Q4H PRN for SHORTNESS OF 

BREATH, (Reported)


Albuterol Sulfate 18 Gm Hfa.aer.ad, 2 PUFF INH Q4H PRN for SHORTNESS OF BREATH, 

(Reported)


Amitriptyline HCl 25 Mg Tablet, 25 MG PO BID, (Reported)


   LAST FILLED #60 2-2-18 


Aspirin/Acetaminophen/Caffeine 1 Each Tablet, 1 TAB PO BID PRN for MIGRAINE, (

Reported)


Azithromycin 250 Mg Tablet, 250 MG PO UD


   TAKE 2 TABLETS TODAY, THEN TAKE 1 TABLET DAILY FOR 4 MORE DAYS 


   Prescribed by: NINA HENLEY on 3/23/18 1145


Benzonatate 200 Mg Capsule, 200 MG PO Q8H PRN for COUGH, (Reported)


Budesonide 0.5 Mg/2 Ml Ampul.neb, 0.5 MG IH BID


   Prescribed by: JACOB WILHELM on 3/23/18 1203


Citalopram Hydrobromide 10 Mg Tablet, 10 MG PO DAILY, (Reported)


   LAST FILLED #90 12-5-17 


Lorazepam 1 Mg Tablet, 1 MG PO Q6H PRN for ANXIETY/PAIN, (Reported)


Omeprazole 40 Mg Capsule.dr, 40 MG PO DAILY PRN for HEARTBURN, (Reported)


   LAST FILLED #90 8-14-17 


Prednisone 10 Mg Tab, 10 MG PO DAILY, (Reported)


Prednisone 20 Mg Tab, 20 MG PO DAILY


   Prescribed by: NINA HENLEY on 3/23/18 1145


Risperidone 2 Mg Tablet, 2 MG PO BID, (Reported)


   LAST FILLED #60 2-16-18 


Tiotropium Bromide 1 Inh Aerp, 1 CAP IH DAILY, (Reported)


Trazodone HCl 50 Mg Tablet, 50 MG PO HS, (Reported)





Patient Home Medication List


Home Medication List Reviewed:  Yes





Review of Systems


Constitutional:  No chills, No fever, No malaise; other (fatigue)


EENTM:  no symptoms reported


Respiratory:  see HPI, cough, dyspnea on exertion, phlegm, short of breath; No 

stridor; wheezing


Cardiovascular:  No chest pain, No edema, No palpitations


Gastrointestinal:  no symptoms reported


Genitourinary:  no symptoms reported


Musculoskeletal:  no symptoms reported


Skin:  no symptoms reported


Psychiatric/Neurological:  No Symptoms Reported


All Other Systems Reviewed


Negative Unless Noted:  Yes (Negative excepted noted.)





Past Medical-Social-Family Hx


Patient Social History


Alcohol Use:  Denies Use


Recreational Drug Use:  No


Smoking Status:  Current Everyday Smoker


Type Used:  Cigarettes


Former Smoker, Quit:  Jan 11, 2016


2nd Hand Smoke Exposure:  Yes


Recent Foreign Travel:  No


Contact w/Someone Who Travel:  No


Recent Infectious Disease Expo:  No


Recent Hopitalizations:  Yes (Feb 2018)





Immunizations Up To Date


Tetanus Booster (TDap):  Unknown


Date of Influenza Vaccine:  Jan 4, 2018





Seasonal Allergies


Seasonal Allergies:  No





Past Medical History


Surgeries:  Yes (TUBAL LIG)


Tubal Ligation


Respiratory:  Yes (O2 DEPENDENT)


COPD


Currently Using CPAP:  No


Currently Using BIPAP:  No


Cardiac:  Yes (PIN HOLE IN HEART)


Neurological:  No


Reproductive Disorders:  No


Female Reproductive Disorders:  Denies


GYN History:  Tubal Ligation, Menopausal


Sexually Transmitted Disease:  No


HIV/AIDS:  No


Genitourinary:  No


Gastrointestinal:  Yes


Gastroesophageal Reflux


Musculoskeletal:  Yes


Degenerate Disk Disease, Arthritis, Chronic Back Pain


Endocrine:  No


HEENT:  Yes


Cataract


Loss of Vision:  Denies


Hearing Impairment:  Denies


Cancer:  No


Psychosocial:  Yes


Sleep Difficulties, Anxiety, Depression


Integumentary:  Yes (SHINGLES 12/2016)


Blood Disorders:  No


Adverse Reaction/Blood Tranf:  No





Family Medical History


Reviewed Nursing Family Hx





Neoplasm


  19 FATHER


Respiratory disorder


  19 FATHER


No Pertinent Family Hx





Physical Exam


Vital Signs





Vital Signs - First Documented








 5/20/18 5/20/18





 14:57 17:22


 


Temp 99.0 


 


Pulse 110 


 


Resp 18 


 


B/P (MAP) 118/82 (94) 


 


Pulse Ox 100 


 


O2 Delivery Nasal Cannula 


 


O2 Flow Rate  3.00





Capillary Refill : Less Than 3 Seconds


General Appearance:  no apparent distress, thin, other (chronically ill-

appearing female.)


HEENT:  PERRL/EOMI, normal ENT inspection, TMs normal, pharynx normal


Neck:  non-tender, normal inspection


Respiratory:  decreased breath sounds, wheezing


Cardiovascular:  normal peripheral pulses, regular rate, rhythm, no edema, no 

gallop, no murmur


Gastrointestinal:  normal bowel sounds, non tender, soft, no organomegaly


Extremities:  no pedal edema, no calf tenderness, normal capillary refill


Neurologic/Psychiatric:  CNs II-XII nml as tested, no motor/sensory deficits, 

alert, normal mood/affect, oriented x 3


Skin:  normal color, warm/dry





Focused Exam


Lactate Level


5/20/18 15:06: Lactic Acid Level 1.30





Lactic Acid Level





Laboratory Tests








Test


 5/20/18


15:06


 


Lactic Acid Level


 1.30 MMOL/L


(0.50-2.00)











Procedures/Interventions





   Suture Size:  5-0





Progress/Results/Core Measures


Suspected Sepsis


Recent Fever Within 48 Hours:  No


Infection Criteria Present:  Suspected New Infection


New/Unexplained  Altered Menta:  No


Sepsis Screen:  No Definite Risk


SIRS


Temperature:99.0 


Pulse: 100 


Respiratory Rate: 18


 


Laboratory Tests


5/20/18 15:06: White Blood Count 6.5


Blood Pressure 107 /75 


Mean: 86


 


5/20/18 15:06: Lactic Acid Level 1.30


Laboratory Tests


5/20/18 15:06: 


Creatinine 1.02, INR Comment 1.0, Platelet Count 181, Total Bilirubin 0.6








Results/Orders


Lab Results





Laboratory Tests








Test


 5/20/18


15:06 5/20/18


15:55 Range/Units


 


 


White Blood Count


 6.5 


 


 4.3-11.0


10^3/uL


 


Red Blood Count


 4.54 


 


 4.35-5.85


10^6/uL


 


Hemoglobin 13.2   11.5-16.0  G/DL


 


Hematocrit 41   35-52  %


 


Mean Corpuscular Volume 90   80-99  FL


 


Mean Corpuscular Hemoglobin 29   25-34  PG


 


Mean Corpuscular Hemoglobin


Concent 32 


 


 32-36  G/DL





 


Red Cell Distribution Width 15.4 H  10.0-14.5  %


 


Platelet Count


 181 


 


 130-400


10^3/uL


 


Mean Platelet Volume 9.1   7.4-10.4  FL


 


Neutrophils (%) (Auto) 74   42-75  %


 


Lymphocytes (%) (Auto) 17   12-44  %


 


Monocytes (%) (Auto) 9   0-12  %


 


Eosinophils (%) (Auto) 0   0-10  %


 


Basophils (%) (Auto) 0   0-10  %


 


Neutrophils # (Auto) 4.9   1.8-7.8  X 10^3


 


Lymphocytes # (Auto) 1.1   1.0-4.0  X 10^3


 


Monocytes # (Auto) 0.6   0.0-1.0  X 10^3


 


Eosinophils # (Auto)


 0.0 


 


 0.0-0.3


10^3/uL


 


Basophils # (Auto)


 0.0 


 


 0.0-0.1


10^3/uL


 


Prothrombin Time 12.9   12.2-14.7  SEC


 


INR Comment 1.0   0.8-1.4  


 


Activated Partial


Thromboplast Time 35 


 


 24-35  SEC





 


D-Dimer


 1.10 H


 


 0.00-0.49


UG/ML


 


Sodium Level 136   135-145  MMOL/L


 


Potassium Level 4.4   3.6-5.0  MMOL/L


 


Chloride Level 96 L    MMOL/L


 


Carbon Dioxide Level 27   21-32  MMOL/L


 


Anion Gap 13   5-14  MMOL/L


 


Blood Urea Nitrogen 17   7-18  MG/DL


 


Creatinine


 1.02 


 


 0.60-1.30


MG/DL


 


Estimat Glomerular Filtration


Rate 55 


 


  





 


BUN/Creatinine Ratio 17    


 


Glucose Level 89     MG/DL


 


Lactic Acid Level


 1.30 


 


 0.50-2.00


MMOL/L


 


Calcium Level 9.1   8.5-10.1  MG/DL


 


Total Bilirubin 0.6   0.1-1.0  MG/DL


 


Aspartate Amino Transf


(AST/SGOT) 25 


 


 5-34  U/L





 


Alanine Aminotransferase


(ALT/SGPT) 14 


 


 0-55  U/L





 


Alkaline Phosphatase 69     U/L


 


Troponin I < 0.30   <0.30  NG/ML


 


Total Protein 6.7   6.4-8.2  GM/DL


 


Albumin 4.2   3.2-4.5  GM/DL


 


TSH Cascade Testing


 1.28 


 


 0.35-4.94


UIU/ML


 


Urine Color  YELLOW   


 


Urine Clarity  CLEAR   


 


Urine pH  6  5-9  


 


Urine Specific Gravity  1.020  1.016-1.022  


 


Urine Protein  2+ H NEGATIVE  


 


Urine Glucose (UA)  NEGATIVE  NEGATIVE  


 


Urine Ketones  2+ H NEGATIVE  


 


Urine Nitrite  NEGATIVE  NEGATIVE  


 


Urine Bilirubin  NEGATIVE  NEGATIVE  


 


Urine Urobilinogen  NORMAL  NORMAL  MG/DL


 


Urine Leukocyte Esterase  1+ H NEGATIVE  


 


Urine RBC (Auto)  1+ H NEGATIVE  


 


Urine RBC  0-2   /HPF


 


Urine WBC  2-5   /HPF


 


Urine Squamous Epithelial


Cells 


 2-5 


  /HPF





 


Urine Crystals  NONE   /LPF


 


Urine Bacteria  FEW H  /HPF


 


Urine Casts  NONE   /LPF


 


Urine Mucus  NEGATIVE   /LPF


 


Urine Culture Indicated  NO   








My Orders





Orders - MILENA OSORIO PA


Cbc With Automated Diff (5/20/18 15:36)


Comprehensive Metabolic Panel (5/20/18 15:36)


Lactic Acid Analyzer (5/20/18 15:36)


Blood Culture (5/20/18 15:36)


Sputum Culture (5/20/18 15:36)


Ua Culture If Indicated (5/20/18 15:36)


Protime With Inr (5/20/18 15:36)


Partial Thromboplastin Time (5/20/18 15:36)


Chest 1 View, Ap/Pa Only (5/20/18 15:36)


O2 (5/20/18 15:36)


Saline Lock/Iv-Start (5/20/18 15:36)


Ekg Tracing (5/20/18 15:36)


Troponin I (5/20/18 15:36)


Vital Signs Adult Sepsis Patie Q1H (5/20/18 15:36)


Remove Rings In Anticipation O (5/20/18 15:36)


Thyroid Analyzer (5/20/18 15:36)


Ns Iv 1000 Ml (Sodium Chloride 0.9%) (5/20/18 15:36)


Fibrin Degradation Products (5/20/18 15:36)


Methylprednisolone Sod Succ (Solu-Medrol (5/20/18 17:56)


Albuterol/Ipra Inhalation Soln (Duoneb I (5/20/18 18:00)


Svn Small Volume Nebulizer (5/20/18 17:56)


Albuterol/Ipra Inhalation Soln (Duoneb I (5/20/18 17:53)


Ct Angio Chest W (5/20/18 18:02)


Iohexol Injection (Omnipaque 350 Mg/Ml 1 (5/20/18 18:15)


Ns (Ivpb) (Sodium Chloride 0.9% Ivpb Bag (5/20/18 18:15)


Albuterol  Pre-Mix Nebs (Rt) (Proventil (5/20/18 18:21)


Svn Small Volume Nebulizer (5/20/18 18:21)





Medications Given in ED





Current Medications








 Medications  Dose


 Ordered  Sig/Arlet


 Route  Start Time


 Stop Time Status Last Admin


Dose Admin


 


 Albuterol/


 Ipratropium  3 ml  ONCE  ONCE


 INH  5/20/18 18:00


 5/20/18 18:01 DC 5/20/18 18:05


3 ML


 


 Sodium Chloride  1,000 ml @ 


 0 mls/hr  Q0M ONCE


 IV  5/20/18 15:36


 5/20/18 15:39 DC 5/20/18 16:14


1,000 MLS/HR








Vital Signs/I&O











 5/20/18 5/20/18 5/20/18





 14:57 17:22 18:07


 


Temp 99.0  


 


Pulse 110 100 


 


Resp 18 18 


 


B/P (MAP) 118/82 (94) 107/75 (86) 


 


Pulse Ox 100 100 98


 


O2 Delivery Nasal Cannula Nasal Cannula Nasal Cannula


 


O2 Flow Rate  3.00 3.00





Capillary Refill : Less Than 3 Seconds








Blood Pressure Mean:  86











Diagnostic Imaging





   Diagonstic Imaging:  Xray


   Plain Films/CT/US/NM/MRI:  chest


Comments


CHEST 1 VIEW, AP/PA ONLY CLINICAL INDICATION: Patient with respiratory problems 

and shortness of air. EXAM: Portable chest x-ray upright view. COMPARISON: 

Portable chest x-ray upright view dated 03/22/2018. FINDINGS: Stable increased 

lung markings in both lung bases which may represent scarring. Otherwise, lungs 

are clear. There are slightly hyperinflated lungs and flattened hemidiaphragms 

which can be seen with COPD changes. Pulmonary vasculature and cardiac 

silhouette within normal limits. There is no pleural effusion or pneumothorax. 

There are degenerative spurs involving the thoracic spine. IMPRESSION: 1: 

Stable COPD lung changes. 2: Otherwise, stable chest x-ray exam with no 

interval radiographic evidence of acute cardiopulmonary process. Dictated by: 

Dictated on workstation # [id number]


   Reviewed:  Reviewed by Me (radiology report reviewed by me)





Departure


Impression





 Primary Impression:  


 COPD with acute exacerbation


Disposition:  01 HOME, SELF-CARE


Condition:  Improved





Departure-Patient Inst.


Referrals:  


NINA HENLEY MD (PCP/Family)


Primary Care Physician


Patient Instructions:  Exacerbation of COPD (DC)





Add. Discharge Instructions:  


All discharge instructions reviewed with patient and/or family. Voiced 

understanding.  Medications as instructed.  Continue usual home medications 

including the albuterol nebulizer treatments.  Follow-up with your primary care 

provider for recheck as outpatient early this week.  Return to the emergency 

department for worsened symptoms or any other concerns.


Scripts


Prednisone (Prednisone) 20 Mg Tab


40 MG PO DAILY, #10 TAB 0 Refills


   Prov: MILENA OSORIO         5/20/18











MILENA OSORIO May 20, 2018 18:02

## 2018-10-17 ENCOUNTER — HOSPITAL ENCOUNTER (OUTPATIENT)
Dept: HOSPITAL 75 - RAD | Age: 63
End: 2018-10-17
Attending: FAMILY MEDICINE
Payer: MEDICARE

## 2018-10-17 DIAGNOSIS — R10.12: Primary | ICD-10-CM

## 2018-10-17 PROCEDURE — 74018 RADEX ABDOMEN 1 VIEW: CPT

## 2018-10-17 NOTE — DIAGNOSTIC IMAGING REPORT
INDICATION: 

Abdominal pain.



TECHNIQUE: 

An AP view of the abdomen was obtained.



FINDINGS:

The overall bowel gas pattern is unremarkable. There is a

moderate amount of stool throughout the colon. No free

intraperitoneal gas or pneumatosis is identified and there is no

evidence of pathologic abdominal calcification.



IMPRESSION: 

Probable mild constipation without other evidence of acute

abnormality.



Dictated by: 



  Dictated on workstation # [id number]

## 2019-02-25 ENCOUNTER — HOSPITAL ENCOUNTER (EMERGENCY)
Dept: HOSPITAL 75 - ER | Age: 64
Discharge: HOME | End: 2019-02-25
Payer: MEDICARE

## 2019-02-25 VITALS — BODY MASS INDEX: 25.76 KG/M2 | WEIGHT: 140 LBS | HEIGHT: 62 IN

## 2019-02-25 VITALS — DIASTOLIC BLOOD PRESSURE: 98 MMHG | SYSTOLIC BLOOD PRESSURE: 151 MMHG

## 2019-02-25 DIAGNOSIS — Z99.81: ICD-10-CM

## 2019-02-25 DIAGNOSIS — K21.9: ICD-10-CM

## 2019-02-25 DIAGNOSIS — N39.0: ICD-10-CM

## 2019-02-25 DIAGNOSIS — Z79.82: ICD-10-CM

## 2019-02-25 DIAGNOSIS — Z88.8: ICD-10-CM

## 2019-02-25 DIAGNOSIS — Z87.891: ICD-10-CM

## 2019-02-25 DIAGNOSIS — F41.9: Primary | ICD-10-CM

## 2019-02-25 DIAGNOSIS — F32.9: ICD-10-CM

## 2019-02-25 DIAGNOSIS — R41.0: ICD-10-CM

## 2019-02-25 DIAGNOSIS — Z79.52: ICD-10-CM

## 2019-02-25 DIAGNOSIS — Z98.51: ICD-10-CM

## 2019-02-25 DIAGNOSIS — Z79.51: ICD-10-CM

## 2019-02-25 DIAGNOSIS — J44.9: ICD-10-CM

## 2019-02-25 LAB
ALBUMIN SERPL-MCNC: 4.6 GM/DL (ref 3.2–4.5)
ALP SERPL-CCNC: 92 U/L (ref 40–136)
ALT SERPL-CCNC: 15 U/L (ref 0–55)
APTT PPP: YELLOW S
ARTERIAL PATENCY WRIST A: POSITIVE
BACTERIA #/AREA URNS HPF: (no result) /HPF
BASE EXCESS STD BLDA CALC-SCNC: 3.1 MMOL/L (ref -2.5–2.5)
BASOPHILS # BLD AUTO: 0 10^3/UL (ref 0–0.1)
BASOPHILS NFR BLD AUTO: 1 % (ref 0–10)
BDY SITE: (no result)
BILIRUB SERPL-MCNC: 0.4 MG/DL (ref 0.1–1)
BILIRUB UR QL STRIP: NEGATIVE
BODY TEMPERATURE: 95.1
BUN/CREAT SERPL: 9
CALCIUM SERPL-MCNC: 10 MG/DL (ref 8.5–10.1)
CHLORIDE SERPL-SCNC: 100 MMOL/L (ref 98–107)
CO2 BLDA CALC-SCNC: 29.1 MMOL/L (ref 21–31)
CO2 SERPL-SCNC: 28 MMOL/L (ref 21–32)
CREAT SERPL-MCNC: 1.28 MG/DL (ref 0.6–1.3)
EOSINOPHIL # BLD AUTO: 0 10^3/UL (ref 0–0.3)
EOSINOPHIL NFR BLD AUTO: 0 % (ref 0–10)
ERYTHROCYTE [DISTWIDTH] IN BLOOD BY AUTOMATED COUNT: 13.9 % (ref 10–14.5)
FIBRINOGEN PPP-MCNC: (no result) MG/DL
GFR SERPLBLD BASED ON 1.73 SQ M-ARVRAT: 42 ML/MIN
GLUCOSE SERPL-MCNC: 107 MG/DL (ref 70–105)
GLUCOSE UR STRIP-MCNC: NEGATIVE MG/DL
HCT VFR BLD CALC: 42 % (ref 35–52)
HGB BLD-MCNC: 13.5 G/DL (ref 11.5–16)
INHALED O2 FLOW RATE: (no result) L/MIN
KETONES UR QL STRIP: NEGATIVE
LEUKOCYTE ESTERASE UR QL STRIP: (no result)
LYMPHOCYTES # BLD AUTO: 0.7 X 10^3 (ref 1–4)
LYMPHOCYTES NFR BLD AUTO: 13 % (ref 12–44)
MANUAL DIFFERENTIAL PERFORMED BLD QL: NO
MCH RBC QN AUTO: 30 PG (ref 25–34)
MCHC RBC AUTO-ENTMCNC: 33 G/DL (ref 32–36)
MCV RBC AUTO: 92 FL (ref 80–99)
MONOCYTES # BLD AUTO: 0.3 X 10^3 (ref 0–1)
MONOCYTES NFR BLD AUTO: 5 % (ref 0–12)
NEUTROPHILS # BLD AUTO: 4.7 X 10^3 (ref 1.8–7.8)
NEUTROPHILS NFR BLD AUTO: 81 % (ref 42–75)
NITRITE UR QL STRIP: NEGATIVE
PCO2 BLDA: 43 MMHG (ref 35–45)
PH BLDA: 7.42 [PH] (ref 7.37–7.43)
PH UR STRIP: 7 [PH] (ref 5–9)
PLATELET # BLD: 220 10^3/UL (ref 130–400)
PMV BLD AUTO: 9 FL (ref 7.4–10.4)
PO2 BLDA: 121 MMHG (ref 79–93)
POTASSIUM SERPL-SCNC: 4 MMOL/L (ref 3.6–5)
PROT SERPL-MCNC: 7 GM/DL (ref 6.4–8.2)
PROT UR QL STRIP: NEGATIVE
RBC #/AREA URNS HPF: (no result) /HPF
SAO2 % BLDA FROM PO2: 99 % (ref 94–100)
SODIUM SERPL-SCNC: 138 MMOL/L (ref 135–145)
SP GR UR STRIP: 1.01 (ref 1.02–1.02)
SQUAMOUS #/AREA URNS HPF: (no result) /HPF
UROBILINOGEN UR-MCNC: NORMAL MG/DL
VENTILATION MODE VENT: NO
WBC # BLD AUTO: 5.8 10^3/UL (ref 4.3–11)
WBC #/AREA URNS HPF: (no result) /HPF

## 2019-02-25 PROCEDURE — 96365 THER/PROPH/DIAG IV INF INIT: CPT

## 2019-02-25 PROCEDURE — 81000 URINALYSIS NONAUTO W/SCOPE: CPT

## 2019-02-25 PROCEDURE — 80053 COMPREHEN METABOLIC PANEL: CPT

## 2019-02-25 PROCEDURE — 36600 WITHDRAWAL OF ARTERIAL BLOOD: CPT

## 2019-02-25 PROCEDURE — 82805 BLOOD GASES W/O2 SATURATION: CPT

## 2019-02-25 PROCEDURE — 87077 CULTURE AEROBIC IDENTIFY: CPT

## 2019-02-25 PROCEDURE — 71046 X-RAY EXAM CHEST 2 VIEWS: CPT

## 2019-02-25 PROCEDURE — 85025 COMPLETE CBC W/AUTO DIFF WBC: CPT

## 2019-02-25 PROCEDURE — 70450 CT HEAD/BRAIN W/O DYE: CPT

## 2019-02-25 PROCEDURE — 36415 COLL VENOUS BLD VENIPUNCTURE: CPT

## 2019-02-25 PROCEDURE — 87088 URINE BACTERIA CULTURE: CPT

## 2019-02-25 NOTE — DIAGNOSTIC IMAGING REPORT
INDICATION: Confusion, weakness, and dizziness.



Noncontrast brain CT is performed.



Comparison made to 02/22/2013.



There are mild atrophic changes. There are no extra-axial fluid

collections. No intracranial hemorrhage. There are patchy

low-density changes throughout the deep white matter compatible

with chronic ischemic change. There is no acute hemorrhage or

subdural or epidural collection. Calvarial windows are

unremarkable.



IMPRESSION: Mild atrophy and chronic changes in deep white

matter. No acute intracranial abnormality.



Dictated by: 



  Dictated on workstation # [id number]

## 2019-02-25 NOTE — ED GENERAL
General


Stated Complaint:  CONFUSED WEAKNESS


Source of Information:  Patient


Exam Limitations:  No Limitations





History of Present Illness


Date Seen by Provider:  2019


Time Seen by Provider:  14:44


Initial Comments


To ER with reports of intermittent but worsening memory loss and confusion, 

"snappy" referring to agitation and anxiety and generalized weakness. Daughter 

states that the memory loss and confusion has been intermittent for several 

months but over the past week has been much worse. Brought patient to the 

emergency room for this reason. While in the waiting room the patient told her 

daughter that she ran out of her lorazepam which she takes 2 mg of 3 times a 

day 4-5 days ago. She does have COPD and wears oxygen at home. She reports 

shortness of breath that is a bit worse than usual, no cough. Daughter states 

that she is concerned mother is developing dementia. Also states that the 

patient has several dogs in the house to urinate on the carpet and upon 

entering the house the smell of dog urine is overwhelming which she feels may 

be worsening the COPD. Patient herself states that she notices herself to be 

"snappy" and more forgetful lately.


Timing/Duration:  1-2 Days


Severity:  Moderate


Associated Systoms:  Cough (chronic and unchanged); No Fever/Chills; Nausea/

Vomiting





Allergies and Home Medications


Allergies


Coded Allergies:  


     fluticasone (Unverified  Allergy, Mild, 12/3/09)


     salmeterol (Unverified  Allergy, Mild, 12/3/09)





Home Medications


Acetaminophen with Codeine 1 Each Tablet, 1 TAB PO Q6H PRN for PAIN-MODERATE, (

Reported)


Albuterol Sulfate 2.5 Mg/0.5 Ml Vial.neb, 2.5 MG IH Q4H PRN for SHORTNESS OF 

BREATH, (Reported)


Albuterol Sulfate 18 Gm Hfa.aer.ad, 2 PUFF INH Q4H PRN for SHORTNESS OF BREATH, 

(Reported)


Amitriptyline HCl 25 Mg Tablet, 25 MG PO BID, (Reported)


   LAST FILLED #60 2-2-18 


Aspirin/Acetaminophen/Caffeine 1 Each Tablet, 1 TAB PO BID PRN for MIGRAINE, (

Reported)


Azithromycin 250 Mg Tablet, 250 MG PO UD


   TAKE 2 TABLETS TODAY, THEN TAKE 1 TABLET DAILY FOR 4 MORE DAYS 


   Prescribed by: NINA HENLEY on 3/23/18 1145


Azithromycin 250 Mg Tablet, 250 MG PO DAILY


   Prescribed by: MILENA OSORIO on 18


Benzonatate 200 Mg Capsule, 200 MG PO Q8H PRN for COUGH, (Reported)


Budesonide 0.5 Mg/2 Ml Ampul.neb, 0.5 MG IH BID


   Prescribed by: JACOB WILHELM on 3/23/18 1203


Citalopram Hydrobromide 10 Mg Tablet, 10 MG PO DAILY, (Reported)


   LAST FILLED #90 17 


Lorazepam 1 Mg Tablet, 1 MG PO Q6H PRN for ANXIETY/PAIN, (Reported)


Omeprazole 40 Mg Capsule.dr, 40 MG PO DAILY PRN for HEARTBURN, (Reported)


   LAST FILLED #90 17 


Prednisone 10 Mg Tab, 10 MG PO DAILY, (Reported)


Prednisone 20 Mg Tab, 20 MG PO DAILY


   Prescribed by: NINA HENLEY on 3/23/18 1145


Prednisone 20 Mg Tab, 40 MG PO DAILY


   Prescribed by: MILENA OSORIO on 18 1828


Risperidone 2 Mg Tablet, 2 MG PO BID, (Reported)


   LAST FILLED #60 18 


Tiotropium Bromide 1 Inh Aerp, 1 CAP IH DAILY, (Reported)


Trazodone HCl 50 Mg Tablet, 50 MG PO HS, (Reported)





Patient Home Medication List


Home Medication List Reviewed:  Yes





Review of Systems


Review of Systems


Constitutional:  see HPI


EENTM:  see HPI


Respiratory:  see HPI, cough, short of breath


Cardiovascular:  no symptoms reported


Genitourinary:  no symptoms reported


Musculoskeletal:  no symptoms reported


Skin:  no symptoms reported


Psychiatric/Neurological:  No Symptoms Reported


Hematologic/Lymphatic:  No Symptoms Reported


Immunological/Allergic:  no symptoms reported





Past Medical-Social-Family Hx


Patient Social History


Type Used:  Cigarettes


Former Smoker, Quit:  2016


2nd Hand Smoke Exposure:  Yes


Recent Foreign Travel:  No


Contact w/Someone Who Travel:  No


Recent Hopitalizations:  Yes (2018)





Immunizations Up To Date


Tetanus Booster (TDap):  Unknown


Date of Influenza Vaccine:  2018





Seasonal Allergies


Seasonal Allergies:  No





Past Medical History


Surgeries:  Yes (TUBAL LIG)


Tubal Ligation


Respiratory:  Yes (O2 DEPENDENT)


COPD


Currently Using CPAP:  No


Currently Using BIPAP:  No


Cardiac:  Yes (PIN HOLE IN HEART)


Neurological:  No


Reproductive Disorders:  No


Female Reproductive Disorders:  Denies


GYN History:  Tubal Ligation, Menopausal


Sexually Transmitted Disease:  No


HIV/AIDS:  No


Genitourinary:  No


Gastrointestinal:  Yes


Gastroesophageal Reflux


Musculoskeletal:  Yes


Degenerate Disk Disease, Arthritis, Chronic Back Pain


Endocrine:  No


HEENT:  Yes


Cataract


Loss of Vision:  Denies


Hearing Impairment:  Denies


Cancer:  No


Psychosocial:  Yes


Sleep Difficulties, Anxiety, Depression


Integumentary:  Yes (SHINGLES 2016)


Blood Disorders:  No


Adverse Reaction/Blood Tranf:  No





Family Medical History





Neoplasm


  19 FATHER


Respiratory disorder


  19 FATHER


No Pertinent Family Hx





Physical Exam


Vital Signs





Vital Signs - First Documented








 19





 14:31


 


Temp 96.8


 


Pulse 109


 


Resp 18


 


B/P (MAP) 187/101 (129)


 


Pulse Ox 95





Capillary Refill :


Height, Weight, BMI


Height: 5'4.00"


Weight: 118lbs. 0.0oz. [id number]; 20.3 BMI


Method:Estimated


General Appearance:  No Apparent Distress, WD/WN


Eyes:  Bilateral Eye Normal Inspection, Bilateral Eye PERRL, Bilateral Eye EOMI


HEENT:  PERRL/EOMI, TMs Normal


Neck:  Full Range of Motion, Normal Inspection


Respiratory:  No Accessory Muscle Use, No Respiratory Distress, Decreased 

Breath Sounds; No Respiratory Distress, No Rhonci, No Stridor, No Wheezing


Cardiovascular:  Regular Rate, Rhythm, Normal Peripheral Pulses


Gastrointestinal:  Normal Bowel Sounds, Non Tender, Soft


Extremity:  Normal Capillary Refill, Normal Inspection


Neurologic/Psychiatric:  Alert, Oriented x3, No Motor/Sensory Deficits


Skin:  Normal Color, Warm/Dry





Procedures/Interventions





   Suture Size:  5-0





Progress/Results/Core Measures


Suspected Sepsis


SIRS


Temperature: 


Pulse:  


Respiratory Rate: 


 


Laboratory Tests


19 14:48: White Blood Count 5.8


Blood Pressure  / 


Mean: 


 





Laboratory Tests


19 14:48: 


Creatinine 1.28, Platelet Count 220, Total Bilirubin 0.4








Results/Orders


Lab Results





Laboratory Tests








Test


 19


14:48 19


14:54 19


15:32 Range/Units


 


 


White Blood Count


 5.8 


 


 


 4.3-11.0


10^3/uL


 


Red Blood Count


 4.54 


 


 


 4.35-5.85


10^6/uL


 


Hemoglobin 13.5    11.5-16.0  G/DL


 


Hematocrit 42    35-52  %


 


Mean Corpuscular Volume 92    80-99  FL


 


Mean Corpuscular Hemoglobin 30    25-34  PG


 


Mean Corpuscular Hemoglobin


Concent 33 


 


 


 32-36  G/DL





 


Red Cell Distribution Width 13.9    10.0-14.5  %


 


Platelet Count


 220 


 


 


 130-400


10^3/uL


 


Mean Platelet Volume 9.0    7.4-10.4  FL


 


Neutrophils (%) (Auto) 81 H   42-75  %


 


Lymphocytes (%) (Auto) 13    12-44  %


 


Monocytes (%) (Auto) 5    0-12  %


 


Eosinophils (%) (Auto) 0    0-10  %


 


Basophils (%) (Auto) 1    0-10  %


 


Neutrophils # (Auto) 4.7    1.8-7.8  X 10^3


 


Lymphocytes # (Auto) 0.7 L   1.0-4.0  X 10^3


 


Monocytes # (Auto) 0.3    0.0-1.0  X 10^3


 


Eosinophils # (Auto)


 0.0 


 


 


 0.0-0.3


10^3/uL


 


Basophils # (Auto)


 0.0 


 


 


 0.0-0.1


10^3/uL


 


Sodium Level 138    135-145  MMOL/L


 


Potassium Level 4.0    3.6-5.0  MMOL/L


 


Chloride Level 100      MMOL/L


 


Carbon Dioxide Level 28    21-32  MMOL/L


 


Anion Gap 10    5-14  MMOL/L


 


Blood Urea Nitrogen 11    7-18  MG/DL


 


Creatinine


 1.28 


 


 


 0.60-1.30


MG/DL


 


Estimat Glomerular Filtration


Rate 42 


 


 


  





 


BUN/Creatinine Ratio 9     


 


Glucose Level 107 H     MG/DL


 


Calcium Level 10.0    8.5-10.1  MG/DL


 


Corrected Calcium     8.5-10.1  MG/DL


 


Total Bilirubin 0.4    0.1-1.0  MG/DL


 


Aspartate Amino Transf


(AST/SGOT) 17 


 


 


 5-34  U/L





 


Alanine Aminotransferase


(ALT/SGPT) 15 


 


 


 0-55  U/L





 


Alkaline Phosphatase 92      U/L


 


Total Protein 7.0    6.4-8.2  GM/DL


 


Albumin 4.6 H   3.2-4.5  GM/DL


 


Urine Color  YELLOW    


 


Urine Clarity


 


 SLIGHTLY


CLOUDY 


  





 


Urine pH  7   5-9  


 


Urine Specific Gravity  1.010 L  1.016-1.022  


 


Urine Protein  NEGATIVE   NEGATIVE  


 


Urine Glucose (UA)  NEGATIVE   NEGATIVE  


 


Urine Ketones  NEGATIVE   NEGATIVE  


 


Urine Nitrite  NEGATIVE   NEGATIVE  


 


Urine Bilirubin  NEGATIVE   NEGATIVE  


 


Urine Urobilinogen  NORMAL   NORMAL  MG/DL


 


Urine Leukocyte Esterase  3+ H  NEGATIVE  


 


Urine RBC (Auto)  1+ H  NEGATIVE  


 


Urine RBC  RARE    /HPF


 


Urine WBC  25-50 H   /HPF


 


Urine Squamous Epithelial


Cells 


 5-10 


 


  /HPF





 


Urine Crystals  NONE    /LPF


 


Urine Bacteria  MODERATE H   /HPF


 


Urine Casts  NONE    /LPF


 


Urine Mucus  NEGATIVE    /LPF


 


Urine Culture Indicated  YES    


 


Blood Gas Puncture Site   LEFT BRACHIAL   


 


Blood Gas Patient Temperature   95.1   


 


Arterial Blood pH   7.42  7.37-7.43  


 


Arterial Blood Partial


Pressure CO2 


 


 43 


 35-45  MMHG





 


Arterial Blood Partial


Pressure O2 


 


 121 H


 79-93  MMHG





 


Arterial Blood HCO3   28 H 23-27  MMOL/L


 


Arterial Blood Total CO2


 


 


 29.1 


 21.0-31.0


MMOL/L


 


Arterial Blood Oxygen


Saturation 


 


 99 


   %





 


Arterial Blood Base Excess


 


 


 3.1 H


 -2.5-2.5


MMOL/L


 


Hermes Test   POSITIVE   


 


Blood Gas Ventilator Setting   NO   


 


Blood Gas Inspired Oxygen   3 L   








My Orders





Orders - HERNANDEZ HERNANDEZ


Cbc With Automated Diff (19 14:41)


Comprehensive Metabolic Panel (19 14:41)


Ua Culture If Indicated (19 14:41)


Iv Heplock-Insert (Order) (19 14:41)


Chest Pa/Lat (2 View) (19 14:41)


Ct Head Wo (19 14:41)


Lorazepam Tablet (Ativan Tablet) (19 14:42)


Urine Culture (19 14:54)


Ceftriaxone  For Iv Use (Rocephin  For I (19 15:15)


Arterial Blood Gas (19 15:32)





Medications Given in ED





Current Medications








 Medications  Dose


 Ordered  Sig/Arlet


 Route  Start Time


 Stop Time Status Last Admin


Dose Admin


 


 Ceftriaxone


 Sodium 1000 mg/


 Sterile Water  10 ml @ 


 200 mls/hr  ONCE  ONCE


 IV  19 15:15


 19 15:17 DC 19 15:23


200 MLS/HR








Vital Signs/I&O











 19





 14:31


 


Temp 96.8


 


Pulse 109


 


Resp 18


 


B/P (MAP) 187/101 (129)


 


Pulse Ox 95





Capillary Refill :





Departure


Communication (Admissions)


NAME:   ESTEFANÍA NATHAN


[facility] REC#:   [id number]


ACCOUNT#:   [id number]


PT STATUS:   REG ER


:   1955


PHYSICIAN:   HERNANDEZ HERNANDEZ


ADMIT DATE:   19/ER


 ***Draft***


Date of Exam:19





CT HEAD WO








INDICATION: Confusion, weakness, and dizziness.





Noncontrast brain CT is performed.





Comparison made to 2013.





There are mild atrophic changes. There are no extra-axial fluid


collections. No intracranial hemorrhage. There are patchy


low-density changes throughout the deep white matter compatible


with chronic ischemic change. There is no acute hemorrhage or


subdural or epidural collection. Calvarial windows are


unremarkable.





IMPRESSION: Mild atrophy and chronic changes in deep white


matter. No acute intracranial abnormality.





  Dictated on workstation # [id number]








Dict:   19 1518


Trans:   19 1522


 [id number]





Interpreted by:     VLAD ESPINAL MD


Electronically signed by:  


NAME:   ESTEFANÍA NATHAN


[facility] REC#:   [id number]


ACCOUNT#:   [id number]


PT STATUS:   REG ER


:   1955


PHYSICIAN:   HERNANDEZ HERNANDEZ


ADMIT DATE:   19/ER


 ***Draft***


Date of Exam:19





CHEST PA/LAT (2 VIEW)








INDICATION: 


Confusion and shortness of breath.





TECHNIQUE: 


PA and lateral views of the chest were obtained at 3:12 PM. 





COMPARISON:    


2018.





FINDINGS:


The heart is normal in size. The mediastinal silhouette is


unremarkable. There is severe COPD change with hyperinflation.


There is no focal infiltrate, pneumothorax, or pleural fluid.





IMPRESSION:


Severe COPD changes. No focal infiltrate or pleural fluid.





  Dictated on workstation # [id number]








Dict:   19 1520


Trans:   19 1523


 [id number]





Interpreted by:     VLAD ESPINAL MD


Electronically signed by:





Impression





 Primary Impression:  


 Anxiety


 Additional Impressions:  


 Urinary tract infection


 Qualified Codes:  N30.00 - Acute cystitis without hematuria


 Intermittent confusion


Disposition:  01 HOME, SELF-CARE


Condition:  Stable





Departure-Patient Inst.


Decision time for Depature:  15:45


Referrals:  


NINA HENLEY MD (PCP/Family)


Primary Care Physician


Patient Instructions:  Urinary Tract Infection, Adult (DC)





Add. Discharge Instructions:  


1. You have a urinary tract infection and you are  having withdrawal symptoms 

from being out of her lorazepam for the past 4-5 days. This appears to be ready 

for you to  at [facility]. Take the antibiotic as directed which has also 

been sent to [facility]. I've made an appointment for you Thursday of this week at 

1 PM with Dr. Henley


Scripts


Cefuroxime Axetil (Cefuroxime) 250 Mg Tablet


250 MG PO BID, #10 TAB


   Prov: HERNANDEZ HERNANDEZ         19





Copy


Copies To 1:   NINA HENLEY MD, PETER J APRN 2019 14:47

## 2019-02-25 NOTE — DIAGNOSTIC IMAGING REPORT
INDICATION: 

Confusion and shortness of breath.



TECHNIQUE: 

PA and lateral views of the chest were obtained at 3:12 PM. 



COMPARISON:    

05/20/2018.



FINDINGS:

The heart is normal in size. The mediastinal silhouette is

unremarkable. There is severe COPD change with hyperinflation.

There is no focal infiltrate, pneumothorax, or pleural fluid.



IMPRESSION:

Severe COPD changes. No focal infiltrate or pleural fluid.



Dictated by: 



  Dictated on workstation # [id number]

## 2019-02-26 ENCOUNTER — HOSPITAL ENCOUNTER (INPATIENT)
Dept: HOSPITAL 75 - ER | Age: 64
LOS: 2 days | Discharge: HOME | DRG: 690 | End: 2019-02-28
Attending: FAMILY MEDICINE | Admitting: FAMILY MEDICINE
Payer: MEDICARE

## 2019-02-26 VITALS — HEIGHT: 62 IN | BODY MASS INDEX: 25.58 KG/M2 | WEIGHT: 139 LBS

## 2019-02-26 VITALS — SYSTOLIC BLOOD PRESSURE: 127 MMHG | DIASTOLIC BLOOD PRESSURE: 84 MMHG

## 2019-02-26 VITALS — SYSTOLIC BLOOD PRESSURE: 155 MMHG | DIASTOLIC BLOOD PRESSURE: 81 MMHG

## 2019-02-26 VITALS — SYSTOLIC BLOOD PRESSURE: 155 MMHG | DIASTOLIC BLOOD PRESSURE: 80 MMHG

## 2019-02-26 VITALS — DIASTOLIC BLOOD PRESSURE: 80 MMHG | SYSTOLIC BLOOD PRESSURE: 155 MMHG

## 2019-02-26 DIAGNOSIS — J44.9: ICD-10-CM

## 2019-02-26 DIAGNOSIS — R41.82: ICD-10-CM

## 2019-02-26 DIAGNOSIS — H53.40: ICD-10-CM

## 2019-02-26 DIAGNOSIS — K21.9: ICD-10-CM

## 2019-02-26 DIAGNOSIS — N30.00: Primary | ICD-10-CM

## 2019-02-26 DIAGNOSIS — T50.905A: ICD-10-CM

## 2019-02-26 DIAGNOSIS — Z87.891: ICD-10-CM

## 2019-02-26 DIAGNOSIS — Z99.81: ICD-10-CM

## 2019-02-26 DIAGNOSIS — F03.90: ICD-10-CM

## 2019-02-26 LAB
ALBUMIN SERPL-MCNC: 4.9 GM/DL (ref 3.2–4.5)
ALP SERPL-CCNC: 90 U/L (ref 40–136)
ALT SERPL-CCNC: 15 U/L (ref 0–55)
APTT PPP: (no result) S
BACTERIA #/AREA URNS HPF: (no result) /HPF
BASOPHILS # BLD AUTO: 0 10^3/UL (ref 0–0.1)
BASOPHILS NFR BLD AUTO: 0 % (ref 0–10)
BASOPHILS NFR BLD MANUAL: 0 %
BILIRUB SERPL-MCNC: 0.4 MG/DL (ref 0.1–1)
BILIRUB UR QL STRIP: NEGATIVE
BUN/CREAT SERPL: 10
CALCIUM SERPL-MCNC: 10.2 MG/DL (ref 8.5–10.1)
CHLORIDE SERPL-SCNC: 99 MMOL/L (ref 98–107)
CO2 SERPL-SCNC: 30 MMOL/L (ref 21–32)
CREAT SERPL-MCNC: 1.35 MG/DL (ref 0.6–1.3)
EOSINOPHIL # BLD AUTO: 0 10^3/UL (ref 0–0.3)
EOSINOPHIL NFR BLD AUTO: 1 % (ref 0–10)
EOSINOPHIL NFR BLD MANUAL: 1 %
ERYTHROCYTE [DISTWIDTH] IN BLOOD BY AUTOMATED COUNT: 14.1 % (ref 10–14.5)
FIBRINOGEN PPP-MCNC: (no result) MG/DL
GFR SERPLBLD BASED ON 1.73 SQ M-ARVRAT: 40 ML/MIN
GLUCOSE SERPL-MCNC: 96 MG/DL (ref 70–105)
GLUCOSE UR STRIP-MCNC: NEGATIVE MG/DL
HCT VFR BLD CALC: 43 % (ref 35–52)
HGB BLD-MCNC: 13.7 G/DL (ref 11.5–16)
KETONES UR QL STRIP: NEGATIVE
LEUKOCYTE ESTERASE UR QL STRIP: (no result)
LYMPHOCYTES # BLD AUTO: 0.6 X 10^3 (ref 1–4)
LYMPHOCYTES NFR BLD AUTO: 9 % (ref 12–44)
MAGNESIUM SERPL-MCNC: 2.4 MG/DL (ref 1.8–2.4)
MANUAL DIFFERENTIAL PERFORMED BLD QL: YES
MCH RBC QN AUTO: 30 PG (ref 25–34)
MCHC RBC AUTO-ENTMCNC: 32 G/DL (ref 32–36)
MCV RBC AUTO: 92 FL (ref 80–99)
MONOCYTES # BLD AUTO: 0.2 X 10^3 (ref 0–1)
MONOCYTES NFR BLD AUTO: 3 % (ref 0–12)
MONOCYTES NFR BLD: 3 %
NEUTROPHILS # BLD AUTO: 5.7 X 10^3 (ref 1.8–7.8)
NEUTROPHILS NFR BLD AUTO: 88 % (ref 42–75)
NEUTS BAND NFR BLD MANUAL: 84 %
NEUTS BAND NFR BLD: 3 %
NITRITE UR QL STRIP: NEGATIVE
OVALOCYTES BLD QL SMEAR: SLIGHT
PH UR STRIP: 6.5 [PH] (ref 5–9)
PLATELET # BLD: 230 10^3/UL (ref 130–400)
PMV BLD AUTO: 8.8 FL (ref 7.4–10.4)
POTASSIUM SERPL-SCNC: 4.2 MMOL/L (ref 3.6–5)
PROT SERPL-MCNC: 7.5 GM/DL (ref 6.4–8.2)
PROT UR QL STRIP: (no result)
RBC #/AREA URNS HPF: (no result) /HPF
SODIUM SERPL-SCNC: 137 MMOL/L (ref 135–145)
SP GR UR STRIP: 1.01 (ref 1.02–1.02)
SQUAMOUS #/AREA URNS HPF: (no result) /HPF
UROBILINOGEN UR-MCNC: NORMAL MG/DL
VARIANT LYMPHS NFR BLD MANUAL: 9 %
WBC # BLD AUTO: 6.5 10^3/UL (ref 4.3–11)
WBC #/AREA URNS HPF: (no result) /HPF

## 2019-02-26 PROCEDURE — 83735 ASSAY OF MAGNESIUM: CPT

## 2019-02-26 PROCEDURE — 84443 ASSAY THYROID STIM HORMONE: CPT

## 2019-02-26 PROCEDURE — 85007 BL SMEAR W/DIFF WBC COUNT: CPT

## 2019-02-26 PROCEDURE — 85025 COMPLETE CBC W/AUTO DIFF WBC: CPT

## 2019-02-26 PROCEDURE — 87040 BLOOD CULTURE FOR BACTERIA: CPT

## 2019-02-26 PROCEDURE — 70450 CT HEAD/BRAIN W/O DYE: CPT

## 2019-02-26 PROCEDURE — 96365 THER/PROPH/DIAG IV INF INIT: CPT

## 2019-02-26 PROCEDURE — 36600 WITHDRAWAL OF ARTERIAL BLOOD: CPT

## 2019-02-26 PROCEDURE — 83605 ASSAY OF LACTIC ACID: CPT

## 2019-02-26 PROCEDURE — 85027 COMPLETE CBC AUTOMATED: CPT

## 2019-02-26 PROCEDURE — 86141 C-REACTIVE PROTEIN HS: CPT

## 2019-02-26 PROCEDURE — 82805 BLOOD GASES W/O2 SATURATION: CPT

## 2019-02-26 PROCEDURE — 94640 AIRWAY INHALATION TREATMENT: CPT

## 2019-02-26 PROCEDURE — 36415 COLL VENOUS BLD VENIPUNCTURE: CPT

## 2019-02-26 PROCEDURE — 71046 X-RAY EXAM CHEST 2 VIEWS: CPT

## 2019-02-26 PROCEDURE — 87077 CULTURE AEROBIC IDENTIFY: CPT

## 2019-02-26 PROCEDURE — 87088 URINE BACTERIA CULTURE: CPT

## 2019-02-26 PROCEDURE — 81000 URINALYSIS NONAUTO W/SCOPE: CPT

## 2019-02-26 PROCEDURE — 70551 MRI BRAIN STEM W/O DYE: CPT

## 2019-02-26 PROCEDURE — 80053 COMPREHEN METABOLIC PANEL: CPT

## 2019-02-26 RX ADMIN — SODIUM CHLORIDE SCH MLS/HR: 900 INJECTION, SOLUTION INTRAVENOUS at 18:19

## 2019-02-26 NOTE — ED GENERAL
General


Chief Complaint:  Neurological Problems


Stated Complaint:  BLURRY VISION;PT NOT HERSELF


Nursing Triage Note:  


Pt to ED via wheelchair with daughter.  Daughter reports pt c/o blurred vision 


in L eye at 1144.  Daughter also reports pt is dizzy.  Daughter reports feeling 


concerned pt has early dementia.  Pt has appt with PCP on Thursday.  Pt denies 


blurred vision and dizziness at this time.  Pt c/o weakness.  Daughter reports 


pt had been out of lorazepam for 4-5 days and began taking med again yesterday.

  


Neuro check performed and no deficit noted at this time.


Nursing Sepsis Screen:  No Definite Risk


Source of Information:  Patient


Exam Limitations:  No Limitations


 (ANDRE BALDERRAMA STUDENT)





History of Present Illness


Date Seen by Provider:  2019


Time Seen by Provider:  13:11


Initial Comments


64 y/o F presented for confusion, dizziness, and blurry vision. She was seen 

yesterday (19) in the ED for confusion and increased agitation and was 

found to have a UTI and started on cefuroxime. She has some early dementia, but 

she and her SO say that she took her antibiotic this morning. Her daughter 

notes that since yesterday, her confusion has worsened and she had the episode 

of blurry vision this morning at 1145; the patient denies dizziness, confusion, 

and blurry vision at this time. The patient does note increased weakness. She 

was restarted on her lorazepam yesterday after being off of the medication for 

the past 4-5 days. The daughter has concerns about dementia, as well. They 

tried to get her into her PCP today but cannot; she has an appointment on 

Thursday with Dr. Henley.


Timing/Duration:  1-3 Hours


Severity:  Mild


Associated Systoms:  No Chest Pain; Cough (chronic); No Fever/Chills, No 

Headaches, No Malaise, No Nausea/Vomiting; Shortness of Air, Weakness (ANDRE MATTHEW STUDENT)


Timing/Duration:  2-3 Days


 (LOREE ZULUAGA MD)





Allergies and Home Medications


Allergies


Coded Allergies:  


     fluticasone (Unverified  Allergy, Mild, 12/3/09)


     salmeterol (Unverified  Allergy, Mild, 12/3/09)





Home Medications


Acetaminophen with Codeine 1 Each Tablet, 1 TAB PO Q6H PRN for PAIN-MODERATE, (

Reported)


Albuterol Sulfate 2.5 Mg/0.5 Ml Vial.neb, 2.5 MG IH Q4H PRN for SHORTNESS OF 

BREATH, (Reported)


Albuterol Sulfate 18 Gm Hfa.aer.ad, 2 PUFF INH Q4H PRN for SHORTNESS OF BREATH, 

(Reported)


Amitriptyline HCl 25 Mg Tablet, 25 MG PO BID, (Reported)


   LAST FILLED #60 18 


Aspirin/Acetaminophen/Caffeine 1 Each Tablet, 1 TAB PO BID PRN for MIGRAINE, (

Reported)


Azithromycin 250 Mg Tablet, 250 MG PO UD


   TAKE 2 TABLETS TODAY, THEN TAKE 1 TABLET DAILY FOR 4 MORE DAYS 


   Prescribed by: NINA HENLEY on 3/23/18 114


Azithromycin 250 Mg Tablet, 250 MG PO DAILY


   Prescribed by: MILENA OSORIO on 18


Benzonatate 200 Mg Capsule, 200 MG PO Q8H PRN for COUGH, (Reported)


Budesonide 0.5 Mg/2 Ml Ampul.neb, 0.5 MG IH BID


   Prescribed by: JACOB WILHELM on 3/23/18 1203


Cefuroxime Axetil 250 Mg Tablet, 250 MG PO BID


   Prescribed by: HERNANDEZ HERNANDEZ on 19 1547


Citalopram Hydrobromide 10 Mg Tablet, 10 MG PO DAILY, (Reported)


   LAST FILLED #90 17 


Lorazepam 1 Mg Tablet, 1 MG PO Q6H PRN for ANXIETY/PAIN, (Reported)


Omeprazole 40 Mg Capsule.dr, 40 MG PO DAILY PRN for HEARTBURN, (Reported)


   LAST FILLED #90 17 


Prednisone 10 Mg Tab, 10 MG PO DAILY, (Reported)


Prednisone 20 Mg Tab, 20 MG PO DAILY


   Prescribed by: NINA HENLEY on 3/23/18 1145


Prednisone 20 Mg Tab, 40 MG PO DAILY


   Prescribed by: MILENA OSORIO on 18 182


Risperidone 2 Mg Tablet, 2 MG PO BID, (Reported)


   LAST FILLED #60 18 


Tiotropium Bromide 1 Inh Aerp, 1 CAP IH DAILY, (Reported)


Trazodone HCl 50 Mg Tablet, 50 MG PO HS, (Reported)





Patient Home Medication List


Home Medication List Reviewed:  Yes


 (ANDRE BALDERRAMA)


Home Medication List Reviewed:  Yes


 (LOREE ZULUAGA MD)





Review of Systems


Review of Systems


Constitutional:  No chills, No fever, No malaise; weakness


EENTM:  blurred vision (occurred at 1144 today but has since resolved); No 

hearing loss, No double vision, No eye pain, No nose congestion, No throat 

swelling


Respiratory:  cough (chronic with phlegm production), phlegm, short of breath (

chronic); No wheezing


Cardiovascular:  No chest pain, No edema, No palpitations


Gastrointestinal:  No abdominal pain; constipation (chronic); No diarrhea, No 

nausea, No vomiting


Genitourinary:  No dysuria, No frequency


Musculoskeletal:  back pain (chronic)


Skin:  No lesions, No rash


Psychiatric/Neurological:  Denies Headache, Denies Numbness, Denies Tingling; 

Weakness (ANDRE BALDERRAMA STUDENT)





All Other Systems Reviewed


Negative Unless Noted:  Yes


 (LOREE ZULUAGA MD)





Past Medical-Social-Family Hx


Past Med/Social Hx:  Reviewed Nursing Past Med/Soc Hx


 (LOREE ZULUAGA MD)


Patient Social History


Alcohol Use:  Denies Use


Recreational Drug Use:  No


Smoking Status:  Former Smoker


Type Used:  Cigarettes


Former Smoker, Quit:  2016


2nd Hand Smoke Exposure:  Yes


Recent Foreign Travel:  No


Contact w/Someone Who Travel:  No


Recent Infectious Disease Expo:  No


Recent Hopitalizations:  Yes (2018)


Physical Abuse:  No


Sexual Abuse:  No


 (ANDRE BALDERRAMA STUDENT)





Immunizations Up To Date


Tetanus Booster (TDap):  Unknown


Date of Influenza Vaccine:  2018


 (ANDRE BALDERRAMA STUDENT)





Seasonal Allergies


Seasonal Allergies:  No


 (ANDRE BALDERRAMA)





Past Medical History


Surgeries:  Yes (TUBAL LIG)


Tubal Ligation


Respiratory:  Yes (O2 DEPENDENT)


COPD


Currently Using CPAP:  No


Currently Using BIPAP:  No


Cardiac:  Yes (PIN HOLE IN HEART)


Neurological:  No


Reproductive Disorders:  No


Female Reproductive Disorders:  Denies


GYN History:  Tubal Ligation, Menopausal


Sexually Transmitted Disease:  No


HIV/AIDS:  No


Genitourinary:  No


Gastrointestinal:  Yes


Gastroesophageal Reflux


Musculoskeletal:  Yes


Degenerate Disk Disease, Arthritis, Chronic Back Pain


Endocrine:  No


HEENT:  Yes


Cataract


Loss of Vision:  Denies


Hearing Impairment:  Denies


Cancer:  No


Psychosocial:  Yes


Sleep Difficulties, Anxiety, Depression


Integumentary:  Yes (SHINGLES 2016)


Blood Disorders:  No


Adverse Reaction/Blood Tranf:  No


 (ANDRE BALDERRAMA STUDENT)





Family Medical History


Reviewed Nursing Family Hx


 (ANDRE BALDERRAMA STUDENT)


Reviewed Nursing Family Hx


 (LOREE ZULUAGA MD)





Neoplasm


  19 FATHER


Respiratory disorder


  19 FATHER


No Pertinent Family Hx


 (ANDRE BALDERRAMA)





Physical Exam


Vital Signs





Vital Signs - First Documented








 19





 12:15


 


Temp 97.6


 


Pulse 92


 


Resp 24


 


B/P (MAP) 127/84 (98)


 


Pulse Ox 98


 


O2 Delivery Room Air





 (LOREE ZULUAGA MD)


Vital Signs


Capillary Refill : Less Than 3 Seconds 


 (ANDRE BALDERRAMA STUDENT)


Height, Weight, BMI


Height: 5'4.00"


Weight: 140lbs. 0.0oz. [id number]; 20.3 BMI


Method:Stated


General Appearance:  No Apparent Distress, WD/WN, Other (responses were delayed 

upon exam and questioning)


Eyes:  Bilateral Eye Normal Inspection, Bilateral Eye PERRL, Bilateral Eye EOMI


HEENT:  PERRL/EOMI, TMs Normal, Normal ENT Inspection, Pharynx Normal


Neck:  Full Range of Motion, Normal Inspection, Non Tender, Supple


Respiratory:  Chest Non Tender, Lungs Clear, Normal Breath Sounds, No Accessory 

Muscle Use, No Respiratory Distress


Cardiovascular:  Regular Rate, Rhythm, No Edema, No Murmur, Normal Peripheral 

Pulses


Gastrointestinal:  Normal Bowel Sounds, Non Tender, Soft


Back:  Normal Inspection, No CVA Tenderness


Extremity:  Non Tender, No Calf Tenderness, No Pedal Edema


Neurologic/Psychiatric:  Alert, No Motor/Sensory Deficits, Normal Mood/Affect, 

CNs II-XII Norm as Tested, Depressed Affect, Other (delayed responses with 

questioning and exam)


Skin:  Normal Color, Warm/Dry (ANDRE BALDERRAMA STUDENT)


General Appearance:  Chronically ill, Mild Distress


HEENT:  PERRL/EOMI, Pharynx Normal


Neck:  Non Tender, Supple


Respiratory:  Decreased Breath Sounds, Wheezing (a few trace wheezes throughout)


Cardiovascular:  Regular Rate, Rhythm, No Edema, No Murmur


Gastrointestinal:  Non Tender, Soft


Back:  Normal Inspection, No CVA Tenderness


Extremity:  Non Tender


Neurologic/Psychiatric:  Alert, Oriented x3, No Motor/Sensory Deficits, CNs II-

XII Norm as Tested


Skin:  Normal Color, Warm/Dry (LOREE ZULUAGA MD)





Focused Exam


Lactate Level


19 15:44: Lactic Acid Level 1.36


 (LOREE ZULUAGA MD)


Lactic Acid Level





Laboratory Tests








Test


 19


15:44


 


Lactic Acid Level


 1.36 MMOL/L


(0.50-2.00)





 (LOREE ZULUAGA MD)





Procedures/Interventions





   Suture Size:  5-0


 (CONCHISANDRE KHRIS STUDENT)





Progress/Results/Core Measures


Suspected Sepsis


Recent Fever Within 48 Hours:  No


Infection Criteria Present:  None


New/Unexplained  Altered Menta:  No


Sepsis Screen:  No Definite Risk


SIRS


Temperature:97.6 


Pulse: 92 


Respiratory Rate: 24


 


Laboratory Tests


19 13:46: White Blood Count 6.5


Blood Pressure 127 /84 


Mean: 98


 





19 15:44: Lactic Acid Level 1.36








Laboratory Tests


19 13:46: 


Creatinine 1.35H, Platelet Count 230, Total Bilirubin 0.4


 (CONCHISANDRE KHRIS STUDENT)





Results/Orders


Lab Results





Laboratory Tests








Test


 19


13:46 19


14:05 19


15:44 Range/Units


 


 


White Blood Count


 6.5 


 


 


 4.3-11.0


10^3/uL


 


Red Blood Count


 4.65 


 


 


 4.35-5.85


10^6/uL


 


Hemoglobin 13.7    11.5-16.0  G/DL


 


Hematocrit 43    35-52  %


 


Mean Corpuscular Volume 92    80-99  FL


 


Mean Corpuscular Hemoglobin 30    25-34  PG


 


Mean Corpuscular Hemoglobin


Concent 32 


 


 


 32-36  G/DL





 


Red Cell Distribution Width 14.1    10.0-14.5  %


 


Platelet Count


 230 


 


 


 130-400


10^3/uL


 


Mean Platelet Volume 8.8    7.4-10.4  FL


 


Neutrophils (%) (Auto) 88 H   42-75  %


 


Lymphocytes (%) (Auto) 9 L   12-44  %


 


Monocytes (%) (Auto) 3    0-12  %


 


Eosinophils (%) (Auto) 1    0-10  %


 


Basophils (%) (Auto) 0    0-10  %


 


Neutrophils # (Auto) 5.7    1.8-7.8  X 10^3


 


Lymphocytes # (Auto) 0.6 L   1.0-4.0  X 10^3


 


Monocytes # (Auto) 0.2    0.0-1.0  X 10^3


 


Eosinophils # (Auto)


 0.0 


 


 


 0.0-0.3


10^3/uL


 


Basophils # (Auto)


 0.0 


 


 


 0.0-0.1


10^3/uL


 


Neutrophils % (Manual) 84     %


 


Lymphocytes % (Manual) 9     %


 


Monocytes % (Manual) 3     %


 


Eosinophils % (Manual) 1     %


 


Basophils % (Manual) 0     %


 


Band Neutrophils 3     %


 


Elliptocytes SLIGHT     


 


Sodium Level 137    135-145  MMOL/L


 


Potassium Level 4.2    3.6-5.0  MMOL/L


 


Chloride Level 99      MMOL/L


 


Carbon Dioxide Level 30    21-32  MMOL/L


 


Anion Gap 8    5-14  MMOL/L


 


Blood Urea Nitrogen 14    7-18  MG/DL


 


Creatinine


 1.35 H


 


 


 0.60-1.30


MG/DL


 


Estimat Glomerular Filtration


Rate 40 


 


 


  





 


BUN/Creatinine Ratio 10     


 


Glucose Level 96      MG/DL


 


Calcium Level 10.2 H   8.5-10.1  MG/DL


 


Corrected Calcium     8.5-10.1  MG/DL


 


Magnesium Level 2.4    1.8-2.4  MG/DL


 


Total Bilirubin 0.4    0.1-1.0  MG/DL


 


Aspartate Amino Transf


(AST/SGOT) 18 


 


 


 5-34  U/L





 


Alanine Aminotransferase


(ALT/SGPT) 15 


 


 


 0-55  U/L





 


Alkaline Phosphatase 90      U/L


 


C-Reactive Protein High


Sensitivity 0.32 


 


 


 0.00-0.50


MG/DL


 


Total Protein 7.5    6.4-8.2  GM/DL


 


Albumin 4.9 H   3.2-4.5  GM/DL


 


Thyroid Stimulating Hormone


(TSH) 0.65 


 


 


 0.35-4.94


UIU/ML


 


Urine Color  RENEA H   


 


Urine Clarity


 


 SLIGHTLY


CLOUDY 


  





 


Urine pH  6.5   5-9  


 


Urine Specific Gravity  1.015 L  1.016-1.022  


 


Urine Protein  1+ H  NEGATIVE  


 


Urine Glucose (UA)  NEGATIVE   NEGATIVE  


 


Urine Ketones  NEGATIVE   NEGATIVE  


 


Urine Nitrite  NEGATIVE   NEGATIVE  


 


Urine Bilirubin  NEGATIVE   NEGATIVE  


 


Urine Urobilinogen  NORMAL   NORMAL  MG/DL


 


Urine Leukocyte Esterase  3+ H  NEGATIVE  


 


Urine RBC (Auto)  NEGATIVE   NEGATIVE  


 


Urine RBC  NONE    /HPF


 


Urine WBC  25-50 H   /HPF


 


Urine Squamous Epithelial


Cells 


 RARE 


 


  /HPF





 


Urine Crystals  NONE    /LPF


 


Urine Bacteria  TRACE    /HPF


 


Urine Casts  NONE    /LPF


 


Urine Mucus  NEGATIVE    /LPF


 


Urine Culture Indicated  YES    


 


Lactic Acid Level


 


 


 1.36 


 0.50-2.00


MMOL/L





 (LOREE ZULUAGA MD)


My Orders





Orders - LOREE ZULUAGA MD


Cbc With Automated Diff (19 13:28)


Comprehensive Metabolic Panel (19 13:28)


Hs C Reactive Protein (19 13:28)


Ua Culture If Indicated (19 13:28)


Saline Lock/Iv-Start (19 13:28)


Ns Iv 500 Ml (Sodium Chloride 0.9%) (19 13:28)


Magnesium (19 13:32)


Thyroid Stimulating Hormone (19 13:32)


Manual Differential (19 13:46)


Urine Culture (19 14:05)


Lactic Acid Analyzer (19 15:14)


Blood Culture (19 15:14)


Ct Head Wo-R/O Stroke (19 15:14)


Dysphagia Screening Tool (19 15:14)


Albuterol/Ipra Inhalation Soln (Duoneb I (19 15:30)


Svn Small Volume Nebulizer (19 15:24)


Ceftriaxone  For Iv Use (Rocephin  For I (19 15:30)


General/Regular (19 Lunch)


 (LOREE ZULUAGA MD)


Medications Given in ED





Current Medications








 Medications  Dose


 Ordered  Sig/Arlet


 Route  Start Time


 Stop Time Status Last Admin


Dose Admin


 


 Albuterol/


 Ipratropium  3 ml  ONCE  ONCE


 INH  19 15:30


 19 15:31 DC 19 15:32


3 ML


 


 Ceftriaxone


 Sodium 1000 mg/


 Sterile Water  10 ml @ 


 200 mls/hr  ONCE  ONCE


 IV  19 15:30


 19 15:32 DC 19 15:53


200 MLS/HR


 


 Sodium Chloride  500 ml @ 0


 mls/hr  Q0M ONCE


 IV  19 13:28


 19 13:31 DC 19 13:45


500 MLS/HR





 (LOREE ZULUAGA MD)


Vital Signs/I&O











 19





 12:15


 


Temp 97.6


 


Pulse 92


 


Resp 24


 


B/P (MAP) 127/84 (98)


 


Pulse Ox 98


 


O2 Delivery Room Air





 (LOREE ZULUAGA MD)


Vital Signs/I&O


Capillary Refill : Less Than 3 Seconds 


 (CONCHIS,ANDRE PINEDO STUDENT)








Blood Pressure Mean:  98








Progress Note :  


Progress Note


I have seen and evaluated the patient and agree with above except as indicated. 

Rectal guaiac care. Daughter is here with the patient reports that she's had 

increased confusion over the last 24 hours but the confusion is been going on 

for a few days. Seen yesterday and had a workup with the only finding of 

urinary tract infection. CT scan yesterday did not show any acute changes. IV, 

labs, UA ordered. Stroke scale done was 0. We will go ahead and get dysphagia 

screen and CT of the head again. Blood cultures and lactic acid were ordered. I 

discussed the case with Dr. Henley at 1532. He accepts patient for admission, 

inpatient status for the urinary tract infection. We will go ahead and get 

DuoNeb treatment going now and I have ordered Rocephin 1 g IV after second 

blood culture done. Patient family agree with plan. 1729: CT results noted. No 

acute findings. Prednisone 40 mg by mouth ordered as patient does appear to 

have somewhat of a COPD exacerbation. We will admit the patient for IV 

antibiotics and continued treatment of her COPD. Patient family agree with plan.


 (LOREE ZULUAGA MD)





Diagnostic Imaging





   Diagonstic Imaging:  CT


   Plain Films/CT/US/NM/MRI:  head


Comments


NAME:      ESTEFANÍA NATHAN


[facility] REC#:   [id number]


ACCOUNT#:   [id number]


PT STATUS:   REG ER


:      1955


PHYSICIAN:    LOREE ZULUAGA MD


ADMIT DATE:   19/ER


 ***Signed***


Date of Exam:   19





CT HEAD WO-R/O STROKE


 





INDICATION: Weakness, dizziness and confusion.





Noncontrast brain CT is performed.





Comparison made to yesterday.





There is mild atrophic change. There are no extra-axial fluid


collections. No intracranial hemorrhage. There are mild


low-density changes in the deep white matter compatible with


chronic ischemic change. There is no acute parenchymal


abnormality in the brain. Ventricles are normal in size and


position. Calvarial windows are unremarkable.





IMPRESSION: Mild atrophy and chronic changes in deep white matter


compatible with chronic ischemia. No acute intracranial


abnormality.





Dictated by: 





  Dictated on workstation # [id number]





[id number]





Dict:      198


Trans:      19





Interpreted by:         VLAD ESPINAL MD


Electronically signed by:   VLAD ESPINAL MD 19   


 (LOREE ZULUAGA MD)





Departure


Communication (Admissions)


Time/Spoke to Admitting Phy:  15:36


 (LOREE ZULUAGA MD)





Impression





 Primary Impression:  


 Urinary tract infection


 Qualified Codes:  N30.00 - Acute cystitis without hematuria


 Additional Impression:  


 COPD (chronic obstructive pulmonary disease)


 Qualified Codes:  J42 - Unspecified chronic bronchitis


Disposition:   ADMITTED AS INPATIENT


Condition:  Stable





Admissions


Decision to Admit Reason:  Admit from ER (General)


Decision to Admit/Date:  2019


Time/Decision to Admit Time:  15:36


 (LOREE ZULUAGA MD)





Departure-Patient Inst.


Referrals:  


NINA HENLEY MD (PCP/Family)


Primary Care Physician











ANDRE BALDERRAMA 2019 13:31


LOREE ZULUAGA MD 2019 15:23

## 2019-02-26 NOTE — HISTORY & PHYSICIAL
History of Present Illness


History of Present Illness


Reason for visit/HPI


64 yo female brought to ED by daughter and boyfriend after she had an episode 

of L sided visual disturbance.  She was seen yesterday in ED for UTI and 

started on antibiotics.  Daughter is concerned about her mental status as she 

has been very confused lately as well.  She lives with her boyfriend and 

apparently the household is filthy according to daughter.  There is four dogs 

living in the household and patient has no desire to adopt out her dogs.  Her 

visual disturbance has improved slightly thoughout the afternoon.


Date of Admission


2019 at 17:30


Date Seen by a Provider:  2019


Time Seen by a Provider:  18:30


I consulted on this patient on


19


 19:27


Attending Physician


Nina Henley MD


Admitting Physician


Nina Henley MD


Consult








Allergies and Home Medications


Allergies


Coded Allergies:  


     fluticasone (Unverified  Allergy, Mild, 12/3/09)


     salmeterol (Unverified  Allergy, Mild, 12/3/09)





Home Medications


Acetaminophen with Codeine 1 Each Tablet, 1 TAB PO Q6H PRN for PAIN-MODERATE, (

Reported)


Albuterol Sulfate 2.5 Mg/0.5 Ml Vial.neb, 2.5 MG IH Q4H PRN for SHORTNESS OF 

BREATH, (Reported)


Albuterol Sulfate 18 Gm Hfa.aer.ad, 2 PUFF INH Q4H PRN for SHORTNESS OF BREATH, 

(Reported)


Amitriptyline HCl 25 Mg Tablet, 25 MG PO BID, (Reported)


   LAST FILLED #60 18 


Aspirin/Acetaminophen/Caffeine 1 Each Tablet, 1 TAB PO BID PRN for MIGRAINE, (

Reported)


Azithromycin 250 Mg Tablet, 250 MG PO UD


   TAKE 2 TABLETS TODAY, THEN TAKE 1 TABLET DAILY FOR 4 MORE DAYS 


   Prescribed by: NINA HENLEY on 3/23/18 1145


Azithromycin 250 Mg Tablet, 250 MG PO DAILY


   Prescribed by: MILENA OSORIO on 18


Benzonatate 200 Mg Capsule, 200 MG PO Q8H PRN for COUGH, (Reported)


Budesonide 0.5 Mg/2 Ml Ampul.neb, 0.5 MG IH BID


   Prescribed by: JACOB WILHELM on 3/23/18 1203


Cefuroxime Axetil 250 Mg Tablet, 250 MG PO BID


   Prescribed by: HERNANDEZ HERNANDEZ on 19 1547


Citalopram Hydrobromide 10 Mg Tablet, 10 MG PO DAILY, (Reported)


   LAST FILLED #90 17 


Lorazepam 1 Mg Tablet, 1 MG PO Q6H PRN for ANXIETY/PAIN, (Reported)


Omeprazole 40 Mg Capsule.dr, 40 MG PO DAILY PRN for HEARTBURN, (Reported)


   LAST FILLED #90 17 


Prednisone 10 Mg Tab, 10 MG PO DAILY, (Reported)


Prednisone 20 Mg Tab, 20 MG PO DAILY


   Prescribed by: NINA HENLEY on 3/23/18 1145


Prednisone 20 Mg Tab, 40 MG PO DAILY


   Prescribed by: MILENA OSORIO on 18 1828


Risperidone 2 Mg Tablet, 2 MG PO BID, (Reported)


   LAST FILLED #60 18 


Tiotropium Bromide 1 Inh Aerp, 1 CAP IH DAILY, (Reported)


Trazodone HCl 50 Mg Tablet, 50 MG PO HS, (Reported)





Patient Home Medication List


Home Medication List Reviewed:  Yes





Past Medical-Social-Family Hx


Patient Social History


Marrital Status:  cohabiting


Alcohol Use:  Denies Use


Recreational Drug Use:  No


Smoking Status:  Former Smoker


Former Smoker, Quit:  2016


Type Used:  Cigarettes


2nd Hand Smoke Exposure:  Yes


Physical Abuse Screen:  No


Sexual Abuse:  No


Recent Foreign Travel:  No


Contact w/other who traveled:  No


Recent Hopitalizations:  Yes (2018)


Recent Infectious Disease Expo:  No





Immunizations Up To Date


Tetanus Booster (TDap):  Unknown


Date of Influenza Vaccine:  2018





Seasonal Allergies


Seasonal Allergies:  No





Surgeries


Yes (TUBAL LIG)


Tubal Ligation





Respiratory


Yes (O2 DEPENDENT)


Currently Using CPAP:  No


Currently Using BIPAP:  No





Cardiovascular


Yes (PIN HOLE IN HEART)





Neurological


No





Reproductive System


Hx Reproductive Disorders:  No


Sexually Transmitted Disease:  No


HIV/AIDS:  No


Female Reproductive Disorders:  Denies


GYN History:  Tubal Ligation, Menopausal





Genitourinary


No





Gastrointestinal


Yes


Gastroesophageal Reflux





Musculoskeletal


Yes


Degenerate Disk Disease, Arthritis, Chronic Back Pain





Endocrine


History of Endocrine Disorders:  No





HEENT


History of HEENT Disorders:  Yes


HEENT Disorders:  Cataract


Loss of Vision:  Denies


Hearing Impairment:  Denies





Cancer


No


Did You Recieve Any Treatments:  No





Psychosocial


History of Psychiatric Problem:  Yes


Behavioral Health Disorders:  Sleep Difficulties, Anxiety, Depression





Integumentary


History of Skin or Integumenta:  Yes (SHINGLES 2016)





Blood Transfusions


History of Blood Disorders:  No


Adverse Reaction to a Blood Tr:  No





Family Medical History


Significant Family History:  No Pertinent Family Hx


Family Hx:  


Neoplasm


  19 FATHER


Respiratory disorder


  19 FATHER





Review of Systems


Constitutional:  see HPI





Physical Exam


Vital Signs





Vital Signs - First Documented








 19





 12:15 18:21


 


Temp 97.6 


 


Pulse 92 


 


Resp 24 


 


B/P (MAP) 127/84 (98) 


 


Pulse Ox 98 


 


O2 Delivery Room Air 


 


O2 Flow Rate  2.00





Capillary Refill : Less Than 3 Seconds


Height, Weight, BMI


Height: 5'2.00"


Weight: 139lbs. 5.0oz. 63.989437eg; 25.1 BMI


Method:Stated


General Appearance:  No Apparent Distress


Eyes:  Left Eye Other (visual field narrow to directly onward); Bilateral Eye 

Normal Inspection, Bilateral Eye PERRL, Bilateral Eye EOMI


HEENT:  Moist Mucous Membranes


Neck:  Supple


Respiratory:  Wheezing (in lung bases)


Cardiovascular:  Regular Rate, Rhythm


Gastrointestinal:  Soft


Rectal:  Deferred


Back:  No CVA Tenderness


Extremity:  Other (muscle wasting)


Neurologic/Psychiatric:  Oriented x3, No Motor/Sensory Deficits


Skin:  Normal Color, Warm/Dry


Comments


NAME:      ESTEFANÍA NATHAN


[facility] REC#:   [id number]


ACCOUNT#:   [id number]


PT STATUS:   REG ER


:      1955


PHYSICIAN:    LOREE ZULUAGA MD


ADMIT DATE:   19/ER


 ***Signed***


Date of Exam:   19





CT HEAD WO-R/O STROKE


 





INDICATION: Weakness, dizziness and confusion.





Noncontrast brain CT is performed.





Comparison made to yesterday.





There is mild atrophic change. There are no extra-axial fluid


collections. No intracranial hemorrhage. There are mild


low-density changes in the deep white matter compatible with


chronic ischemic change. There is no acute parenchymal


abnormality in the brain. Ventricles are normal in size and


position. Calvarial windows are unremarkable.





IMPRESSION: Mild atrophy and chronic changes in deep white matter


compatible with chronic ischemia. No acute intracranial


abnormality.





Dictated by: 





  Dictated on workstation # [id number]





[id number]





Dict:      198


Trans:      [id number]





Interpreted by:         VLAD ESPINAL MD


Electronically signed by:   VLAD ESPINAL MD [id number]





Assessment/Plan


Assessment and Plan


1.  Altered mental status


-Neuro monitoring


-MRI for 





2.  Dementia


-testing outpatient


-MRI of brain part of workup





3.  UTI


-IVFs and IV ceftriaxone





4.  L eye visual disturbance


-monitor


-as per MRI brain





Admission Diagnosis


Admission Status:  Inpatient Order (span 2 midnights)


Reason for Inpatient Admission:  


Further neuro monitoring.  Check MRI.  IVFs for hydration.  Rocephin for


uti





Clinical Quality Measures


DVT/VTE Risk/Contraindication:


Risk Factor Score Per Nursin


RFS Level Per Nursing on Admit:  3=High











NINA HENLEY MD 2019 19:33

## 2019-02-26 NOTE — DIAGNOSTIC IMAGING REPORT
INDICATION: Weakness, dizziness and confusion.



Noncontrast brain CT is performed.



Comparison made to yesterday.



There is mild atrophic change. There are no extra-axial fluid

collections. No intracranial hemorrhage. There are mild

low-density changes in the deep white matter compatible with

chronic ischemic change. There is no acute parenchymal

abnormality in the brain. Ventricles are normal in size and

position. Calvarial windows are unremarkable.



IMPRESSION: Mild atrophy and chronic changes in deep white matter

compatible with chronic ischemia. No acute intracranial

abnormality.



Dictated by: 



  Dictated on workstation # [id number]

## 2019-02-27 VITALS — SYSTOLIC BLOOD PRESSURE: 146 MMHG | DIASTOLIC BLOOD PRESSURE: 92 MMHG

## 2019-02-27 VITALS — SYSTOLIC BLOOD PRESSURE: 148 MMHG | DIASTOLIC BLOOD PRESSURE: 77 MMHG

## 2019-02-27 VITALS — DIASTOLIC BLOOD PRESSURE: 93 MMHG | SYSTOLIC BLOOD PRESSURE: 153 MMHG

## 2019-02-27 VITALS — SYSTOLIC BLOOD PRESSURE: 123 MMHG | DIASTOLIC BLOOD PRESSURE: 78 MMHG

## 2019-02-27 VITALS — SYSTOLIC BLOOD PRESSURE: 136 MMHG | DIASTOLIC BLOOD PRESSURE: 83 MMHG

## 2019-02-27 VITALS — SYSTOLIC BLOOD PRESSURE: 151 MMHG | DIASTOLIC BLOOD PRESSURE: 90 MMHG

## 2019-02-27 LAB
ALBUMIN SERPL-MCNC: 3.9 GM/DL (ref 3.2–4.5)
ALP SERPL-CCNC: 71 U/L (ref 40–136)
ALT SERPL-CCNC: 12 U/L (ref 0–55)
BASOPHILS # BLD AUTO: 0 10^3/UL (ref 0–0.1)
BASOPHILS NFR BLD AUTO: 0 % (ref 0–10)
BILIRUB SERPL-MCNC: 0.3 MG/DL (ref 0.1–1)
BUN/CREAT SERPL: 15
CALCIUM SERPL-MCNC: 9.5 MG/DL (ref 8.5–10.1)
CHLORIDE SERPL-SCNC: 104 MMOL/L (ref 98–107)
CO2 SERPL-SCNC: 23 MMOL/L (ref 21–32)
CREAT SERPL-MCNC: 0.95 MG/DL (ref 0.6–1.3)
EOSINOPHIL # BLD AUTO: 0 10^3/UL (ref 0–0.3)
EOSINOPHIL NFR BLD AUTO: 0 % (ref 0–10)
ERYTHROCYTE [DISTWIDTH] IN BLOOD BY AUTOMATED COUNT: 13.4 % (ref 10–14.5)
GFR SERPLBLD BASED ON 1.73 SQ M-ARVRAT: 60 ML/MIN
GLUCOSE SERPL-MCNC: 143 MG/DL (ref 70–105)
HCT VFR BLD CALC: 37 % (ref 35–52)
HGB BLD-MCNC: 11.8 G/DL (ref 11.5–16)
LYMPHOCYTES # BLD AUTO: 0.5 X 10^3 (ref 1–4)
LYMPHOCYTES NFR BLD AUTO: 11 % (ref 12–44)
MANUAL DIFFERENTIAL PERFORMED BLD QL: NO
MCH RBC QN AUTO: 29 PG (ref 25–34)
MCHC RBC AUTO-ENTMCNC: 32 G/DL (ref 32–36)
MCV RBC AUTO: 92 FL (ref 80–99)
MONOCYTES # BLD AUTO: 0.2 X 10^3 (ref 0–1)
MONOCYTES NFR BLD AUTO: 5 % (ref 0–12)
NEUTROPHILS # BLD AUTO: 3.8 X 10^3 (ref 1.8–7.8)
NEUTROPHILS NFR BLD AUTO: 84 % (ref 42–75)
PLATELET # BLD: 192 10^3/UL (ref 130–400)
PMV BLD AUTO: 9.2 FL (ref 7.4–10.4)
POTASSIUM SERPL-SCNC: 4.1 MMOL/L (ref 3.6–5)
PROT SERPL-MCNC: 5.8 GM/DL (ref 6.4–8.2)
SODIUM SERPL-SCNC: 137 MMOL/L (ref 135–145)
WBC # BLD AUTO: 4.6 10^3/UL (ref 4.3–11)

## 2019-02-27 RX ADMIN — IPRATROPIUM BROMIDE AND ALBUTEROL SULFATE SCH ML: .5; 3 SOLUTION RESPIRATORY (INHALATION) at 15:31

## 2019-02-27 RX ADMIN — LORAZEPAM PRN MG: 1 TABLET ORAL at 21:20

## 2019-02-27 RX ADMIN — IPRATROPIUM BROMIDE AND ALBUTEROL SULFATE SCH ML: .5; 3 SOLUTION RESPIRATORY (INHALATION) at 15:18

## 2019-02-27 RX ADMIN — ALBUTEROL SULFATE SCH PUFF: 90 AEROSOL, METERED RESPIRATORY (INHALATION) at 21:37

## 2019-02-27 RX ADMIN — IPRATROPIUM BROMIDE AND ALBUTEROL SULFATE SCH ML: .5; 3 SOLUTION RESPIRATORY (INHALATION) at 15:19

## 2019-02-27 RX ADMIN — SODIUM CHLORIDE SCH MLS/HR: 900 INJECTION, SOLUTION INTRAVENOUS at 12:26

## 2019-02-27 RX ADMIN — ALBUTEROL SULFATE SCH PUFF: 90 AEROSOL, METERED RESPIRATORY (INHALATION) at 20:15

## 2019-02-27 RX ADMIN — LORAZEPAM PRN MG: 1 TABLET ORAL at 12:21

## 2019-02-27 NOTE — DIAGNOSTIC IMAGING REPORT
CLINICAL INDICATION: Patient with memory problems, confusion, and

altered mental status.



EXAM: MRI of the brain performed without IV contrast. Sequences

include axial DWI, ADC map, axial T2, axial FLAIR, axial T1,

coronal gradient echo, and sagittal T1.



COMPARISON: Head CT without contrast dated 02/26/2019.



FINDINGS:

There is no evidence of acute cerebral infarct, intracranial

hemorrhage, or gross mass effect. 



The brain parenchymal volume appears appropriate for patient's

age. There are multiple focal, patchy and mildly confluent areas

of high T2 signal white matter changes seen throughout both

cerebral hemispheres and periventricular regions, likely

representing chronic small vessel ischemic disease. There is

normal gray-white matter distinction.  There is no significant

midline shift or herniation. 



The [place] vascular structures show no gross

abnormality as visualized. The pituitary gland, sella, and

suprasellar regions are unremarkable as visualized. There is no

evidence of hydrocephalus. The basal cisterns are unremarkable.

The skull, extracranial soft tissue, and orbits are unremarkable.

The paranasal sinuses are unremarkable. Temporal bones show no

significant abnormality.



IMPRESSION:

Unremarkable MRI of the brain for age.



Dictated by: 



  Dictated on workstation # [id number]

## 2019-02-27 NOTE — NUR
SPOKE WITH THE PATIENT ABOUT HER MEDICATIONS. WE WENT OVER THE EXT MED HX AND SHE VERIFIED 
HOW SHE TAKES THEM. 



HER RISPERIDONE AND AMITRIPTYLINE WERE FILLED TO TAKE 2 TABS DAILY HOWEVER SHE ONLY TAKES 1 
TAB ONCE DAILY IN THE MORNING OF EACH.



SHE TAKES EXCEDRIN AND TUSSIN COUGH SYRUP OTC AS NEEDED.



SHE STATES SHE HAS ALBUTEROL NEBULIZER SOLUTION AS WELL AS VENTOLIN AND ATROVENT INHALERS. 
SHE HAS NOT FILLED THE ATROVENT SINCE NOVEMBER. SHE STATES SHE DOES NOT USE IT OFTEN BECAUSE 
IT LEAVES A HORRIBLE TASTE IN HER MOUTH AND SHE FEELS THE VENTOLIN WORKS BETTER.

## 2019-02-27 NOTE — NUR
CM/SS spoke with the patient in regards to SS consult. Patient is hopeful to be discharged 
this day or tomorrow morning. She is worried about her dogs at home. Provided names of and 
some information about [facility] agencies in area. She wanted to speak to a friend that had one of 
them and will let this writer know her preference. Tech was here to take her down for her 
MRI, this writer found her aide to have them assist with getting her ready to go down for 
MRI.

## 2019-02-28 VITALS — DIASTOLIC BLOOD PRESSURE: 67 MMHG | SYSTOLIC BLOOD PRESSURE: 125 MMHG

## 2019-02-28 VITALS — DIASTOLIC BLOOD PRESSURE: 86 MMHG | SYSTOLIC BLOOD PRESSURE: 148 MMHG

## 2019-02-28 VITALS — SYSTOLIC BLOOD PRESSURE: 148 MMHG | DIASTOLIC BLOOD PRESSURE: 86 MMHG

## 2019-02-28 VITALS — DIASTOLIC BLOOD PRESSURE: 66 MMHG | SYSTOLIC BLOOD PRESSURE: 122 MMHG

## 2019-02-28 RX ADMIN — ALBUTEROL SULFATE SCH PUFF: 90 AEROSOL, METERED RESPIRATORY (INHALATION) at 01:14

## 2019-02-28 RX ADMIN — ALBUTEROL SULFATE SCH PUFF: 90 AEROSOL, METERED RESPIRATORY (INHALATION) at 10:47

## 2019-02-28 RX ADMIN — ALBUTEROL SULFATE SCH PUFF: 90 AEROSOL, METERED RESPIRATORY (INHALATION) at 07:13

## 2019-02-28 NOTE — DISCHARGE INST-SIMPLE/STANDARD
Discharge Inst-Standard


Discharge Medications


New, Converted or Re-Newed RX:  Other





Patient Instructions/Follow Up


Plan of Care/Instructions/FU:  


Follow-up with Dr. Ellis in one week


Activity as Tolerated:  Yes


Discharge Diet:  Regular Diet


Return to The Hospital For:  


Severe shortness of breath or high fever not responding to Tylenol





Planned Outpatient Orders/Ref.


Pneu Vac Indicated:  Yes











NINA HENLEY MD Feb 28, 2019 07:46

## 2019-02-28 NOTE — DISCHARGE SUMMARY
Diagnosis/Chief Complaint


Date of Admission


2019 at 17:30


Date of Discharge





 2019


Discharge Date:  2019


Discharge Time:  10:00


Admission Diagnosis


Admission Diagnosis


1.  Altered mental status





2.  Dementia





3.  UTI





4.  L eye visual disturbance





Discharge Diagnosis


1.  Altered mental status--resolved and appears to be related to medication 

interaction


-No evidence for stroke





2.  Dementia--improvement noted





3.  UTI--treated





4.  L eye visual disturbance--resolved





Reason Hospital Visit


64 yo female brought to ED by daughter and boyfriend after she had an episode 

of L sided visual disturbance.  She was seen yesterday in ED for UTI and 

started on antibiotics.  Daughter is concerned about her mental status as she 

has been very confused lately as well.  She lives with her boyfriend and 

apparently the household is filthy according to daughter.  There is four dogs 

living in the household and patient has no desire to adopt out her dogs.  Her 

visual disturbance has improved slightly thoughout the afternoon.





Discharge Summary


Hospital Course


Was the Problem List Reviewed?:  Yes


Hospital Course


63-year-old female admitted through emergency department during the afternoon 

of 2019 after she apparently had left eye disturbance.  She was 

with her boyfriend while he was being seen by his doctor and she started 

experiencing these symptoms.  She was unsure of her blood pressure and she was 

taken to the emergency department.  She underwent workup for stroke since she 

also was apparently not making sense at times with regard to her communication.

  She ultimately in the ED underwent CT of the head which did not reveal any 

acute changes.  She was also found to have evidence for urinary tract infection 

and did receive Rocephin 1 g in the emergency department.  IV fluids were also 

initiated.  She was admitted for further observation of her neurological 

status.  Also in the morning of  she was arranged to have MRI for 

more specific evaluation of the cerebrum.  She was again found to not have any 

acute findings by MRI.  Patient's communication skills improved and she was 

noted to be more coherent during the day of 2019.  She tolerated 

regular diet without any problems.  Her left eye visual disturbance resolved.  

Patient was felt ready for dismissal in the morning of 2019.  The 

urine culture was reported out as no growth.  She will follow up with Dr. Henley 

in one week.


Labs


Laboratory Tests


19 13:46: 


Neutrophils (%) (Auto) 88H, Lymphocytes (%) (Auto) 9L, Lymphocytes # (Auto) 0.6L

, Creatinine 1.35H, Calcium Level 10.2H, Albumin 4.9H


19 14:05: 


Urine Color AMBERH, Urine Specific Gravity 1.015L, Urine Protein 1+H, Urine 

Leukocyte Esterase 3+H, Urine WBC 25-50H


19 15:44: 


19 05:29: 


Neutrophils (%) (Auto) 84H, Lymphocytes (%) (Auto) 11L, Lymphocytes # (Auto) 

0.5L, Red Blood Count 4.06L, Glucose Level 143H, Total Protein 5.8L





Procedures


None.





Discharge Physical Examination


Allergies:  


Coded Allergies:  


     fluticasone (Unverified  Allergy, Mild, 12/3/09)


     salmeterol (Unverified  Allergy, Mild, 12/3/09)


Vitals & I&Os





Vital Signs








  Date Time  Temp Pulse Resp B/P (MAP) Pulse Ox O2 Delivery O2 Flow Rate FiO2


 


19 07:13     96 Nasal Cannula 2.00 


 


19 04:10 98.2 84 20 125/67 (86)    








General Appearance:  No Acute Distress


HEENT:  PERRLA, Other (Visual acuity normal on the left with regards to 

recognition of number of fingers)


Respiratory:  Clear to Auscultation


Cardiovascular:  Regular Rate


Abdominal:  Soft


Skin:  No Rashes


Neuro:  Normal Speech





Discharge


Home Medications


Reviewed and agree with Discharge Medication list on patient's Discharge 

Instruction sheet


Instructions to Patient/Family


Please see electronic discharge instructions given to patient.





Clinical Quality Measures


DVT/VTE Risk/Contraindication:


Risk Factor Score Per Nursin


RFS Level Per Nursing on Admit:  3=High











NINA HENLEY MD 2019 07:52

## 2019-02-28 NOTE — NUR
CM/SS arranged for [employer] taxi voucher as patient had no ride this day from family or friends. 
Taxi will  around 10:45am, patient and RNing updated.

## 2019-03-21 ENCOUNTER — HOSPITAL ENCOUNTER (EMERGENCY)
Dept: HOSPITAL 75 - ER | Age: 64
Discharge: HOME | End: 2019-03-21
Payer: MEDICARE

## 2019-03-21 VITALS — SYSTOLIC BLOOD PRESSURE: 103 MMHG | DIASTOLIC BLOOD PRESSURE: 80 MMHG

## 2019-03-21 VITALS — HEIGHT: 64 IN | BODY MASS INDEX: 27.31 KG/M2 | WEIGHT: 160 LBS

## 2019-03-21 DIAGNOSIS — K21.9: ICD-10-CM

## 2019-03-21 DIAGNOSIS — Z86.19: ICD-10-CM

## 2019-03-21 DIAGNOSIS — Z79.82: ICD-10-CM

## 2019-03-21 DIAGNOSIS — Z98.51: ICD-10-CM

## 2019-03-21 DIAGNOSIS — Z99.81: ICD-10-CM

## 2019-03-21 DIAGNOSIS — Z88.8: ICD-10-CM

## 2019-03-21 DIAGNOSIS — J44.1: Primary | ICD-10-CM

## 2019-03-21 DIAGNOSIS — Z79.52: ICD-10-CM

## 2019-03-21 DIAGNOSIS — F32.9: ICD-10-CM

## 2019-03-21 DIAGNOSIS — F41.0: ICD-10-CM

## 2019-03-21 DIAGNOSIS — Z87.891: ICD-10-CM

## 2019-03-21 DIAGNOSIS — Z79.51: ICD-10-CM

## 2019-03-21 LAB
ALBUMIN SERPL-MCNC: 4.4 GM/DL (ref 3.2–4.5)
ALP SERPL-CCNC: 82 U/L (ref 40–136)
ALT SERPL-CCNC: 19 U/L (ref 0–55)
BASOPHILS # BLD AUTO: 0 10^3/UL (ref 0–0.1)
BASOPHILS NFR BLD AUTO: 1 % (ref 0–10)
BILIRUB SERPL-MCNC: 0.6 MG/DL (ref 0.1–1)
BUN/CREAT SERPL: 10
CALCIUM SERPL-MCNC: 9.5 MG/DL (ref 8.5–10.1)
CHLORIDE SERPL-SCNC: 103 MMOL/L (ref 98–107)
CO2 SERPL-SCNC: 23 MMOL/L (ref 21–32)
CREAT SERPL-MCNC: 1.31 MG/DL (ref 0.6–1.3)
EOSINOPHIL # BLD AUTO: 0.1 10^3/UL (ref 0–0.3)
EOSINOPHIL NFR BLD AUTO: 2 % (ref 0–10)
ERYTHROCYTE [DISTWIDTH] IN BLOOD BY AUTOMATED COUNT: 14.1 % (ref 10–14.5)
GFR SERPLBLD BASED ON 1.73 SQ M-ARVRAT: 41 ML/MIN
GLUCOSE SERPL-MCNC: 99 MG/DL (ref 70–105)
HCT VFR BLD CALC: 43 % (ref 35–52)
HGB BLD-MCNC: 13.7 G/DL (ref 11.5–16)
LYMPHOCYTES # BLD AUTO: 1.9 X 10^3 (ref 1–4)
LYMPHOCYTES NFR BLD AUTO: 30 % (ref 12–44)
MANUAL DIFFERENTIAL PERFORMED BLD QL: NO
MCH RBC QN AUTO: 29 PG (ref 25–34)
MCHC RBC AUTO-ENTMCNC: 32 G/DL (ref 32–36)
MCV RBC AUTO: 90 FL (ref 80–99)
MONOCYTES # BLD AUTO: 0.7 X 10^3 (ref 0–1)
MONOCYTES NFR BLD AUTO: 10 % (ref 0–12)
NEUTROPHILS # BLD AUTO: 3.8 X 10^3 (ref 1.8–7.8)
NEUTROPHILS NFR BLD AUTO: 58 % (ref 42–75)
PLATELET # BLD: 263 10^3/UL (ref 130–400)
PMV BLD AUTO: 8.9 FL (ref 7.4–10.4)
POTASSIUM SERPL-SCNC: 3.7 MMOL/L (ref 3.6–5)
PROT SERPL-MCNC: 6.8 GM/DL (ref 6.4–8.2)
SODIUM SERPL-SCNC: 139 MMOL/L (ref 135–145)
WBC # BLD AUTO: 6.6 10^3/UL (ref 4.3–11)

## 2019-03-21 PROCEDURE — 71045 X-RAY EXAM CHEST 1 VIEW: CPT

## 2019-03-21 PROCEDURE — 85025 COMPLETE CBC W/AUTO DIFF WBC: CPT

## 2019-03-21 PROCEDURE — 94640 AIRWAY INHALATION TREATMENT: CPT

## 2019-03-21 PROCEDURE — 36415 COLL VENOUS BLD VENIPUNCTURE: CPT

## 2019-03-21 PROCEDURE — 80053 COMPREHEN METABOLIC PANEL: CPT

## 2019-03-21 NOTE — ED RESPIRATORY
General


Chief Complaint:  Respiratory Problems


Stated Complaint:  SOB


Nursing Triage Note:  


To ED via EMS.  Pt c/o SOB for three days.  EMS reports giving a duoneb 


treatment enroute.  Pt c/o feeling of panic and chest being sore to the touch.  


Pt reports feeling like there is mucous in the chest but pt can't cough it up.  


Pt showing no obvious signs of distress upon arrival.


Source:  patient


Exam Limitations:  no limitations





History of Present Illness


Date Seen by Provider:  Mar 21, 2019


Time Seen by Provider:  07:51


Initial Comments


The patient is a 63-year-old white female who has smoked since age 16.  She 

reports that she stopped 1 month ago and intends to stay a nonsmoker.  She 

reported some increase in symptomatology for the past 3 days.  She also has a 

history of panic attacks.  She awakened this morning with great anxiety and had 

her  call the ambulance.  She reports coughing but not producing much in 

the way of mucus.


Timing/Duration:  just prior to arrival


Prior Episodes/Possible Cause:  frequent episodes





Allergies and Home Medications


Allergies


Coded Allergies:  


     fluticasone (Unverified  Allergy, Mild, 12/3/09)


     salmeterol (Unverified  Allergy, Mild, 12/3/09)





Home Medications


Albuterol Sulfate 2.5 Mg/0.5 Ml Vial.neb, 2.5 MG IH Q4H PRN for SHORTNESS OF 

BREATH, (Reported)


Albuterol Sulfate 18 Gm Hfa.aer.ad, 2 PUFF INH Q4H PRN for SHORTNESS OF BREATH, 

(Reported)


Aspirin/Acetaminophen/Caffeine 1 Each Tablet, 1 TAB PO BID PRN for MIGRAINE, (

Reported)


Citalopram Hydrobromide 20 Mg Tablet, 20 MG PO DAILY, (Reported)


Dextromethorphan HBr 15 Mg/5 Ml Liquid, 15 MG PO Q4H PRN for COUGH, (Reported)


Ipratropium Bromide 12.9 Gm Aers, 2 PUFF IH QID PRN for SHORTNESS OF BREATH, (

Reported)


Lorazepam 2 Mg Tablet, 2 MG PO TID PRN for ANXIETY, (Reported)


Omeprazole 40 Mg Capsule.dr, 40 MG PO DAILY, (Reported)


Prednisone 10 Mg Tab, 10 MG PO DAILY, (Reported)


Risperidone 2 Mg Tablet, 2 MG PO DAILY, (Reported)


Trazodone HCl 50 Mg Tablet, 50 MG PO HS, (Reported)





Patient Home Medication List


Home Medication List Reviewed:  Yes





Review of Systems


Review of Systems


Constitutional:  see HPI


EENTM:  no symptoms reported


Respiratory:  cough, dyspnea on exertion, short of breath, wheezing


Cardiovascular:  no symptoms reported


Gastrointestinal:  no symptoms reported


Genitourinary:  no symptoms reported


Musculoskeletal:  no symptoms reported


Skin:  no symptoms reported


Psychiatric/Neurological:  No Symptoms Reported


Hematologic/Lymphatic:  No Symptoms Reported


Immunological/Allergic:  no symptoms reported





Past Medical-Social-Family Hx


Patient Social History


Alcohol Use:  Denies Use


Recreational Drug Use:  No


Smoking Status:  Former Smoker


Type Used:  Cigarettes


Former Smoker, Quit:  Jan 11, 2016


2nd Hand Smoke Exposure:  Yes


Recent Foreign Travel:  No


Contact w/Someone Who Travel:  No


Recent Infectious Disease Expo:  No


Recent Hopitalizations:  No


Physical Abuse:  No


Sexual Abuse:  No





Immunizations Up To Date


Tetanus Booster (TDap):  Unknown


Date of Influenza Vaccine:  Nov 4, 2018





Seasonal Allergies


Seasonal Allergies:  No





Past Medical History


Surgeries:  Yes (TUBAL LIG)


Tubal Ligation


Respiratory:  Yes (O2 DEPENDENT)


COPD


Currently Using CPAP:  No


Currently Using BIPAP:  No


Cardiac:  Yes (PIN HOLE IN HEART)


Neurological:  No


Reproductive Disorders:  No


Female Reproductive Disorders:  Denies


GYN History:  Tubal Ligation, Menopausal


Sexually Transmitted Disease:  No


HIV/AIDS:  No


Genitourinary:  No


Gastrointestinal:  Yes


Gastroesophageal Reflux


Musculoskeletal:  Yes


Degenerate Disk Disease, Arthritis, Chronic Back Pain


Endocrine:  No


HEENT:  Yes


Cataract


Loss of Vision:  Denies


Hearing Impairment:  Denies


Cancer:  No


Did You Recieve Any Treatments:  No


Psychosocial:  Yes


Sleep Difficulties, Anxiety, Depression


Integumentary:  Yes (SHINGLES 12/2016)


Blood Disorders:  No


Adverse Reaction/Blood Tranf:  No





Family Medical History





Neoplasm


  19 FATHER


Respiratory disorder


  19 FATHER


No Pertinent Family Hx





Physical Exam





Vital Signs - First Documented








 3/21/19





 06:57


 


Temp 96.8


 


Pulse 108


 


Resp 22


 


B/P (MAP) 123/79 (94)


 


Pulse Ox 100


 


O2 Delivery Nasal Cannula


 


O2 Flow Rate 3.00





Capillary Refill : Less Than 3 Seconds


Height: 5'4.00"


Weight: 160lbs. 5.0oz. 72.064894fi; 25.1 BMI


Method:Stated


General Appearance:  mild distress


Eyes:  Bilateral Eye Normal Inspection


HEENT:  normal ENT inspection


Neck:  full range of motion


Respiratory:  decreased breath sounds (shallow inspiration distant breath 

sounds and no wheezing or rhonchi)


Cardiovascular:  normal peripheral pulses, regular rate, rhythm, no edema, no 

gallop, no JVD, no murmur


Gastrointestinal:  normal bowel sounds, non tender, soft, no organomegaly, no 

pulsatile mass


Extremities:  normal range of motion, non-tender, normal inspection, no pedal 

edema, no calf tenderness, normal capillary refill, pelvis stable


Neurologic/Psychiatric:  CNs II-XII nml as tested, no motor/sensory deficits, 

alert, normal mood/affect, oriented x 3


Skin:  normal color, warm/dry, cyanosis, cool, diaphoresis, damp


Lymphatic:  no adenopathy





Procedures/Interventions





   Suture Size:  5-0





Progress/Results/Core Measures


Suspected Sepsis


Recent Fever Within 48 Hours:  No


Infection Criteria Present:  None


New/Unexplained  Altered Menta:  No


Sepsis Screen:  No Definite Risk


SIRS


Temperature:96.8 


Pulse: 108 


Respiratory Rate: 22


 


Laboratory Tests


3/21/19 07:10: White Blood Count 6.6


Blood Pressure 123 /79 


Mean: 94


 


Laboratory Tests


3/21/19 07:10: 


Creatinine 1.31H, Platelet Count 263, Total Bilirubin 0.6








Results/Orders


Lab Results





Laboratory Tests








Test


 3/21/19


07:10 Range/Units


 


 


White Blood Count


 6.6 


 4.3-11.0


10^3/uL


 


Red Blood Count


 4.74 


 4.35-5.85


10^6/uL


 


Hemoglobin 13.7  11.5-16.0  G/DL


 


Hematocrit 43  35-52  %


 


Mean Corpuscular Volume 90  80-99  FL


 


Mean Corpuscular Hemoglobin 29  25-34  PG


 


Mean Corpuscular Hemoglobin


Concent 32 


 32-36  G/DL





 


Red Cell Distribution Width 14.1  10.0-14.5  %


 


Platelet Count


 263 


 130-400


10^3/uL


 


Mean Platelet Volume 8.9  7.4-10.4  FL


 


Neutrophils (%) (Auto) 58  42-75  %


 


Lymphocytes (%) (Auto) 30  12-44  %


 


Monocytes (%) (Auto) 10  0-12  %


 


Eosinophils (%) (Auto) 2  0-10  %


 


Basophils (%) (Auto) 1  0-10  %


 


Neutrophils # (Auto) 3.8  1.8-7.8  X 10^3


 


Lymphocytes # (Auto) 1.9  1.0-4.0  X 10^3


 


Monocytes # (Auto) 0.7  0.0-1.0  X 10^3


 


Eosinophils # (Auto)


 0.1 


 0.0-0.3


10^3/uL


 


Basophils # (Auto)


 0.0 


 0.0-0.1


10^3/uL


 


Sodium Level 139  135-145  MMOL/L


 


Potassium Level 3.7  3.6-5.0  MMOL/L


 


Chloride Level 103    MMOL/L


 


Carbon Dioxide Level 23  21-32  MMOL/L


 


Anion Gap 13  5-14  MMOL/L


 


Blood Urea Nitrogen 13  7-18  MG/DL


 


Creatinine


 1.31 H


 0.60-1.30


MG/DL


 


Estimat Glomerular Filtration


Rate 41 


  





 


BUN/Creatinine Ratio 10   


 


Glucose Level 99    MG/DL


 


Calcium Level 9.5  8.5-10.1  MG/DL


 


Corrected Calcium 9.2  8.5-10.1  MG/DL


 


Total Bilirubin 0.6  0.1-1.0  MG/DL


 


Aspartate Amino Transf


(AST/SGOT) 19 


 5-34  U/L





 


Alanine Aminotransferase


(ALT/SGPT) 19 


 0-55  U/L





 


Alkaline Phosphatase 82    U/L


 


Total Protein 6.8  6.4-8.2  GM/DL


 


Albumin 4.4  3.2-4.5  GM/DL








My Orders





Orders - SONIA ROLLE MD


Albuterol/Ipra Inhalation Soln (Duoneb I (3/21/19 07:00)


Cbc With Automated Diff (3/21/19 07:28)


Comprehensive Metabolic Panel (3/21/19 07:28)


Chest 1 View, Ap/Pa Only (3/21/19 07:28)





Medications Given in ED





Current Medications








 Medications  Dose


 Ordered  Sig/Arlet


 Route  Start Time


 Stop Time Status Last Admin


Dose Admin


 


 Albuterol/


 Ipratropium  3 ml  STK-MED ONCE


 .ROUTE  3/21/19 07:00


 3/21/19 07:01 DC 3/21/19 07:04


3 ML








Vital Signs/I&O











 3/21/19 3/21/19





 06:57 07:05


 


Temp 96.8 


 


Pulse 108 


 


Resp 22 


 


B/P (MAP) 123/79 (94) 


 


Pulse Ox 100 100


 


O2 Delivery Nasal Cannula Nasal Cannula


 


O2 Flow Rate 3.00 3.00





Capillary Refill : Less Than 3 Seconds








Blood Pressure Mean:  94











Departure


Communication (Admissions)


Chest x-ray shows no infiltrate but is remarkable in its lucency and therefore 

emphysema.





Impression





 Primary Impression:  


 COPD with acute exacerbation


 Additional Impression:  


 Anxiety attack


Disposition:  01 HOME, SELF-CARE


Condition:  Stable/Unchanged





Departure-Patient Inst.


Referrals:  


NINA HENLEY MD (PCP/Family)


Primary Care Physician


Patient Instructions:  Cough, Adult (DC)





Add. Discharge Instructions:  


All discharge instructions reviewed with patient and/or family. Voiced 

understanding.


Continue medications as prescribed.


If further problems see your provider











SONIA ROLLE MD Mar 21, 2019 07:54

## 2019-03-21 NOTE — DIAGNOSTIC IMAGING REPORT
INDICATION: Shortness of air.



COMPARISON: 02/25/2019



FINDINGS: Single frontal view of the chest demonstrates normal

heart size and pulmonary vascularity. The lungs are hyperinflated

and show areas of prominent lucency superiorly consistent with

background of emphysematous disease. There is no focal

consolidation. No large pleural effusion or pneumothorax is seen.

The visualized osseous structures show no acute abnormalities.



IMPRESSION: 

1. No acute cardiopulmonary process.  

2. Background emphysematous disease.



Dictated by: 



  Dictated on workstation # [id number]